# Patient Record
Sex: FEMALE | Race: WHITE | NOT HISPANIC OR LATINO | Employment: FULL TIME | URBAN - METROPOLITAN AREA
[De-identification: names, ages, dates, MRNs, and addresses within clinical notes are randomized per-mention and may not be internally consistent; named-entity substitution may affect disease eponyms.]

---

## 2017-01-24 ENCOUNTER — COMMUNICATION - HEALTHEAST (OUTPATIENT)
Dept: FAMILY MEDICINE | Facility: CLINIC | Age: 37
End: 2017-01-24

## 2017-01-24 DIAGNOSIS — F17.200 NICOTINE DEPENDENCE: ICD-10-CM

## 2017-06-03 ENCOUNTER — COMMUNICATION - HEALTHEAST (OUTPATIENT)
Dept: FAMILY MEDICINE | Facility: CLINIC | Age: 37
End: 2017-06-03

## 2017-06-03 DIAGNOSIS — F17.200 NICOTINE DEPENDENCE: ICD-10-CM

## 2017-06-06 ENCOUNTER — OFFICE VISIT - RIVER FALLS (OUTPATIENT)
Dept: FAMILY MEDICINE | Facility: CLINIC | Age: 37
End: 2017-06-06
Payer: COMMERCIAL

## 2017-06-06 ASSESSMENT — MIFFLIN-ST. JEOR: SCORE: 1640.2

## 2017-06-16 ENCOUNTER — OFFICE VISIT - RIVER FALLS (OUTPATIENT)
Dept: FAMILY MEDICINE | Facility: CLINIC | Age: 37
End: 2017-06-16
Payer: COMMERCIAL

## 2017-06-16 ASSESSMENT — MIFFLIN-ST. JEOR: SCORE: 1641.11

## 2017-07-17 ENCOUNTER — OFFICE VISIT - RIVER FALLS (OUTPATIENT)
Dept: FAMILY MEDICINE | Facility: CLINIC | Age: 37
End: 2017-07-17
Payer: COMMERCIAL

## 2017-07-17 ASSESSMENT — MIFFLIN-ST. JEOR: SCORE: 1657.44

## 2017-07-31 ENCOUNTER — COMMUNICATION - HEALTHEAST (OUTPATIENT)
Dept: FAMILY MEDICINE | Facility: CLINIC | Age: 37
End: 2017-07-31

## 2017-07-31 DIAGNOSIS — F17.200 NICOTINE DEPENDENCE: ICD-10-CM

## 2017-07-31 RX ORDER — VARENICLINE TARTRATE 1 MG/1
TABLET, FILM COATED ORAL
Qty: 56 TABLET | Refills: 0 | Status: SHIPPED | OUTPATIENT
Start: 2017-07-31 | End: 2022-03-24

## 2017-07-31 RX ORDER — VARENICLINE TARTRATE 1 MG/1
1 TABLET, FILM COATED ORAL 2 TIMES DAILY WITH MEALS
Qty: 56 TABLET | Refills: 0 | Status: SHIPPED | OUTPATIENT
Start: 2017-07-31 | End: 2022-03-24

## 2017-08-14 ENCOUNTER — OFFICE VISIT - RIVER FALLS (OUTPATIENT)
Dept: FAMILY MEDICINE | Facility: CLINIC | Age: 37
End: 2017-08-14
Payer: COMMERCIAL

## 2017-08-14 ASSESSMENT — MIFFLIN-ST. JEOR: SCORE: 1626.37

## 2017-08-17 ENCOUNTER — OFFICE VISIT - RIVER FALLS (OUTPATIENT)
Dept: FAMILY MEDICINE | Facility: CLINIC | Age: 37
End: 2017-08-17
Payer: COMMERCIAL

## 2017-08-17 ASSESSMENT — MIFFLIN-ST. JEOR: SCORE: 1610.27

## 2017-09-14 ENCOUNTER — OFFICE VISIT - RIVER FALLS (OUTPATIENT)
Dept: FAMILY MEDICINE | Facility: CLINIC | Age: 37
End: 2017-09-14
Payer: COMMERCIAL

## 2017-09-14 ASSESSMENT — MIFFLIN-ST. JEOR: SCORE: 1603.01

## 2017-10-11 ENCOUNTER — OFFICE VISIT - RIVER FALLS (OUTPATIENT)
Dept: FAMILY MEDICINE | Facility: CLINIC | Age: 37
End: 2017-10-11
Payer: COMMERCIAL

## 2017-10-11 ASSESSMENT — MIFFLIN-ST. JEOR: SCORE: 1608.45

## 2017-10-17 ENCOUNTER — OFFICE VISIT - RIVER FALLS (OUTPATIENT)
Dept: FAMILY MEDICINE | Facility: CLINIC | Age: 37
End: 2017-10-17
Payer: COMMERCIAL

## 2017-10-17 ASSESSMENT — MIFFLIN-ST. JEOR: SCORE: 1595.75

## 2017-11-16 ENCOUNTER — OFFICE VISIT - RIVER FALLS (OUTPATIENT)
Dept: FAMILY MEDICINE | Facility: CLINIC | Age: 37
End: 2017-11-16
Payer: COMMERCIAL

## 2017-11-16 ASSESSMENT — MIFFLIN-ST. JEOR: SCORE: 1575.79

## 2017-12-14 ENCOUNTER — OFFICE VISIT - RIVER FALLS (OUTPATIENT)
Dept: FAMILY MEDICINE | Facility: CLINIC | Age: 37
End: 2017-12-14
Payer: COMMERCIAL

## 2017-12-14 ASSESSMENT — MIFFLIN-ST. JEOR: SCORE: 1586.68

## 2017-12-18 ENCOUNTER — OFFICE VISIT - RIVER FALLS (OUTPATIENT)
Dept: FAMILY MEDICINE | Facility: CLINIC | Age: 37
End: 2017-12-18
Payer: COMMERCIAL

## 2017-12-18 ASSESSMENT — MIFFLIN-ST. JEOR: SCORE: 1595.75

## 2018-01-18 ENCOUNTER — OFFICE VISIT - RIVER FALLS (OUTPATIENT)
Dept: FAMILY MEDICINE | Facility: CLINIC | Age: 38
End: 2018-01-18
Payer: COMMERCIAL

## 2018-01-18 ASSESSMENT — MIFFLIN-ST. JEOR: SCORE: 1569.44

## 2018-02-05 ENCOUNTER — OFFICE VISIT - RIVER FALLS (OUTPATIENT)
Dept: FAMILY MEDICINE | Facility: CLINIC | Age: 38
End: 2018-02-05
Payer: COMMERCIAL

## 2018-02-05 ASSESSMENT — MIFFLIN-ST. JEOR: SCORE: 1558.33

## 2018-02-22 ENCOUNTER — OFFICE VISIT - RIVER FALLS (OUTPATIENT)
Dept: FAMILY MEDICINE | Facility: CLINIC | Age: 38
End: 2018-02-22
Payer: COMMERCIAL

## 2018-02-22 ASSESSMENT — MIFFLIN-ST. JEOR: SCORE: 1555.84

## 2018-03-30 ENCOUNTER — COMMUNICATION - HEALTHEAST (OUTPATIENT)
Dept: HEALTH INFORMATION MANAGEMENT | Facility: CLINIC | Age: 38
End: 2018-03-30

## 2018-03-30 ENCOUNTER — COMMUNICATION - HEALTHEAST (OUTPATIENT)
Dept: TELEHEALTH | Facility: CLINIC | Age: 38
End: 2018-03-30

## 2018-05-03 ENCOUNTER — OFFICE VISIT - RIVER FALLS (OUTPATIENT)
Dept: FAMILY MEDICINE | Facility: CLINIC | Age: 38
End: 2018-05-03
Payer: COMMERCIAL

## 2018-05-03 ASSESSMENT — MIFFLIN-ST. JEOR: SCORE: 1540.41

## 2018-06-04 ENCOUNTER — OFFICE VISIT - RIVER FALLS (OUTPATIENT)
Dept: FAMILY MEDICINE | Facility: CLINIC | Age: 38
End: 2018-06-04
Payer: COMMERCIAL

## 2018-06-04 ASSESSMENT — MIFFLIN-ST. JEOR: SCORE: 1542.45

## 2018-09-20 ENCOUNTER — OFFICE VISIT - RIVER FALLS (OUTPATIENT)
Dept: FAMILY MEDICINE | Facility: CLINIC | Age: 38
End: 2018-09-20
Payer: COMMERCIAL

## 2018-09-20 ASSESSMENT — MIFFLIN-ST. JEOR: SCORE: 1516.83

## 2019-03-21 ENCOUNTER — COMMUNICATION - HEALTHEAST (OUTPATIENT)
Dept: TELEHEALTH | Facility: CLINIC | Age: 39
End: 2019-03-21

## 2019-05-29 ENCOUNTER — COMMUNICATION - RIVER FALLS (OUTPATIENT)
Dept: FAMILY MEDICINE | Facility: CLINIC | Age: 39
End: 2019-05-29
Payer: COMMERCIAL

## 2019-06-20 ENCOUNTER — OFFICE VISIT - RIVER FALLS (OUTPATIENT)
Dept: FAMILY MEDICINE | Facility: CLINIC | Age: 39
End: 2019-06-20
Payer: COMMERCIAL

## 2019-06-20 ASSESSMENT — MIFFLIN-ST. JEOR: SCORE: 1559.46

## 2019-06-21 ENCOUNTER — COMMUNICATION - RIVER FALLS (OUTPATIENT)
Dept: FAMILY MEDICINE | Facility: CLINIC | Age: 39
End: 2019-06-21
Payer: COMMERCIAL

## 2019-06-21 LAB
CHOLEST SERPL-MCNC: 170 MG/DL
CHOLEST/HDLC SERPL: 2.7 {RATIO}
GLUCOSE BLD-MCNC: 85 MG/DL (ref 65–99)
HDLC SERPL-MCNC: 62 MG/DL
LDLC SERPL CALC-MCNC: 90 MG/DL
NONHDLC SERPL-MCNC: 108 MG/DL
TRIGL SERPL-MCNC: 87 MG/DL

## 2019-06-24 ENCOUNTER — COMMUNICATION - RIVER FALLS (OUTPATIENT)
Dept: FAMILY MEDICINE | Facility: CLINIC | Age: 39
End: 2019-06-24
Payer: COMMERCIAL

## 2019-06-25 ENCOUNTER — COMMUNICATION - RIVER FALLS (OUTPATIENT)
Dept: FAMILY MEDICINE | Facility: CLINIC | Age: 39
End: 2019-06-25
Payer: COMMERCIAL

## 2019-06-25 ENCOUNTER — TRANSFERRED RECORDS (OUTPATIENT)
Dept: HEALTH INFORMATION MANAGEMENT | Facility: CLINIC | Age: 39
End: 2019-06-25

## 2019-06-25 LAB — HPV ABSTRACT: NORMAL

## 2019-07-10 ENCOUNTER — COMMUNICATION - RIVER FALLS (OUTPATIENT)
Dept: FAMILY MEDICINE | Facility: CLINIC | Age: 39
End: 2019-07-10
Payer: COMMERCIAL

## 2019-07-24 ENCOUNTER — COMMUNICATION - RIVER FALLS (OUTPATIENT)
Dept: FAMILY MEDICINE | Facility: CLINIC | Age: 39
End: 2019-07-24
Payer: COMMERCIAL

## 2019-08-08 ENCOUNTER — COMMUNICATION - RIVER FALLS (OUTPATIENT)
Dept: FAMILY MEDICINE | Facility: CLINIC | Age: 39
End: 2019-08-08
Payer: COMMERCIAL

## 2019-08-09 ENCOUNTER — COMMUNICATION - RIVER FALLS (OUTPATIENT)
Dept: FAMILY MEDICINE | Facility: CLINIC | Age: 39
End: 2019-08-09
Payer: COMMERCIAL

## 2019-09-10 ENCOUNTER — COMMUNICATION - RIVER FALLS (OUTPATIENT)
Dept: FAMILY MEDICINE | Facility: CLINIC | Age: 39
End: 2019-09-10
Payer: COMMERCIAL

## 2019-09-25 ENCOUNTER — COMMUNICATION - RIVER FALLS (OUTPATIENT)
Dept: FAMILY MEDICINE | Facility: CLINIC | Age: 39
End: 2019-09-25
Payer: COMMERCIAL

## 2019-10-10 ENCOUNTER — OFFICE VISIT - RIVER FALLS (OUTPATIENT)
Dept: FAMILY MEDICINE | Facility: CLINIC | Age: 39
End: 2019-10-10
Payer: COMMERCIAL

## 2019-10-10 ASSESSMENT — MIFFLIN-ST. JEOR: SCORE: 1541.32

## 2019-11-08 ENCOUNTER — COMMUNICATION - RIVER FALLS (OUTPATIENT)
Dept: FAMILY MEDICINE | Facility: CLINIC | Age: 39
End: 2019-11-08
Payer: COMMERCIAL

## 2019-11-26 ENCOUNTER — COMMUNICATION - RIVER FALLS (OUTPATIENT)
Dept: FAMILY MEDICINE | Facility: CLINIC | Age: 39
End: 2019-11-26
Payer: COMMERCIAL

## 2019-12-09 ENCOUNTER — COMMUNICATION - RIVER FALLS (OUTPATIENT)
Dept: FAMILY MEDICINE | Facility: CLINIC | Age: 39
End: 2019-12-09
Payer: COMMERCIAL

## 2020-01-09 ENCOUNTER — OFFICE VISIT - RIVER FALLS (OUTPATIENT)
Dept: FAMILY MEDICINE | Facility: CLINIC | Age: 40
End: 2020-01-09
Payer: COMMERCIAL

## 2020-01-09 ASSESSMENT — MIFFLIN-ST. JEOR: SCORE: 1546.76

## 2020-01-15 ENCOUNTER — COMMUNICATION - RIVER FALLS (OUTPATIENT)
Dept: FAMILY MEDICINE | Facility: CLINIC | Age: 40
End: 2020-01-15
Payer: COMMERCIAL

## 2020-02-10 ENCOUNTER — COMMUNICATION - RIVER FALLS (OUTPATIENT)
Dept: FAMILY MEDICINE | Facility: CLINIC | Age: 40
End: 2020-02-10
Payer: COMMERCIAL

## 2020-03-10 ENCOUNTER — COMMUNICATION - RIVER FALLS (OUTPATIENT)
Dept: FAMILY MEDICINE | Facility: CLINIC | Age: 40
End: 2020-03-10
Payer: COMMERCIAL

## 2020-03-24 ENCOUNTER — COMMUNICATION - HEALTHEAST (OUTPATIENT)
Dept: HEALTH INFORMATION MANAGEMENT | Facility: CLINIC | Age: 40
End: 2020-03-24

## 2020-04-07 ENCOUNTER — COMMUNICATION - RIVER FALLS (OUTPATIENT)
Dept: FAMILY MEDICINE | Facility: CLINIC | Age: 40
End: 2020-04-07
Payer: COMMERCIAL

## 2020-05-06 ENCOUNTER — COMMUNICATION - RIVER FALLS (OUTPATIENT)
Dept: FAMILY MEDICINE | Facility: CLINIC | Age: 40
End: 2020-05-06
Payer: COMMERCIAL

## 2020-05-12 ENCOUNTER — OFFICE VISIT - RIVER FALLS (OUTPATIENT)
Dept: FAMILY MEDICINE | Facility: CLINIC | Age: 40
End: 2020-05-12
Payer: COMMERCIAL

## 2020-06-04 ENCOUNTER — COMMUNICATION - RIVER FALLS (OUTPATIENT)
Dept: FAMILY MEDICINE | Facility: CLINIC | Age: 40
End: 2020-06-04
Payer: COMMERCIAL

## 2020-07-02 ENCOUNTER — COMMUNICATION - RIVER FALLS (OUTPATIENT)
Dept: FAMILY MEDICINE | Facility: CLINIC | Age: 40
End: 2020-07-02
Payer: COMMERCIAL

## 2020-08-03 ENCOUNTER — COMMUNICATION - RIVER FALLS (OUTPATIENT)
Dept: FAMILY MEDICINE | Facility: CLINIC | Age: 40
End: 2020-08-03
Payer: COMMERCIAL

## 2020-08-18 ENCOUNTER — COMMUNICATION - RIVER FALLS (OUTPATIENT)
Dept: FAMILY MEDICINE | Facility: CLINIC | Age: 40
End: 2020-08-18
Payer: COMMERCIAL

## 2020-08-19 ENCOUNTER — AMBULATORY - HEALTHEAST (OUTPATIENT)
Dept: FAMILY MEDICINE | Facility: CLINIC | Age: 40
End: 2020-08-19

## 2020-08-19 ENCOUNTER — VIRTUAL VISIT (OUTPATIENT)
Dept: FAMILY MEDICINE | Facility: OTHER | Age: 40
End: 2020-08-19

## 2020-08-19 ENCOUNTER — AMBULATORY - HEALTHEAST (OUTPATIENT)
Dept: INTERNAL MEDICINE | Facility: CLINIC | Age: 40
End: 2020-08-19

## 2020-08-19 DIAGNOSIS — Z20.822 SUSPECTED 2019 NOVEL CORONAVIRUS INFECTION: ICD-10-CM

## 2020-08-19 NOTE — PROGRESS NOTES
"Date: 2020 08:56:19  Clinician: Codey Thomason  Clinician NPI: [id number]  Patient: Anabela Bartholomew  Patient : 1980  Patient Address: [address] [place] [address]  Patient Phone: [phone]  Visit Protocol: URI  Patient Summary:  Anabela is a 39 year old ( : 1980 ) female who initiated a Visit for COVID-19 (Coronavirus) evaluation and screening. When asked the question \"Please sign me up to receive news, health information and promotions. \", Anabela responded \"No\".    Anabela states her symptoms started suddenly 5-6 days ago. After her symptoms started, they improved and then got worse again.   Her symptoms consist of a headache, chills, malaise, a sore throat, myalgia, and facial pain or pressure. Anabela also feels feverish.   Symptom details     Sore throat: Anabela reports having mild throat pain (1-3 on a 10 point pain scale), does not have exudate on her tonsils, and can swallow liquids. She is not sure if the lymph nodes in her neck are enlarged. A rash has not appeared on the skin since the sore throat started.     Temperature: Her current temperature is 99.2 degrees Fahrenheit.     Facial pain or pressure: The facial pain or pressure does not feel worse when bending or leaning forward.     Headache: She states the headache is moderate (4-6 on a 10 point pain scale).      Anabela denies having ear pain, wheezing, teeth pain, ageusia, diarrhea, cough, nasal congestion, vomiting, rhinitis, nausea, and anosmia. She also denies having a sinus infection within the past year, having recent facial or sinus surgery in the past 60 days, and taking antibiotic medication in the past month. She is not experiencing dyspnea.   Precipitating events  Within the past week, Anabela has not been exposed to someone with strep throat. She has not recently been exposed to someone with influenza. Anabela has not been in close contact with any high risk individuals.   Pertinent COVID-19 (Coronavirus) " information  In the past 14 days, Anabela has not worked in a congregate living setting.   She does not work or volunteer as healthcare worker or a  and does not work or volunteer in a healthcare facility.   Anabela also has not lived in a congregate living setting in the past 14 days. She does not live with a healthcare worker.   Anabela has not had a close contact with a laboratory-confirmed COVID-19 patient within 14 days of symptom onset.   Since December 2019, Anabela and has not had upper respiratory infection or influenza-like illness. Has not been diagnosed with lab-confirmed COVID-19 test   Pertinent medical history  Anabela does not get yeast infections when she takes antibiotics.   Anabela needs a return to work/school note.   Weight: 200 lbs   Anabela smokes or uses smokeless tobacco.   She denies pregnancy and denies breastfeeding. She is currently menstruating.   Weight: 200 lbs    MEDICATIONS: Adderall oral, Complete Multivitamin-Multimineral oral, vitamin A-vitamin D3-vitamin E-vitamin K oral, Chantix Continuing Month Box oral, fluticasone propionate nasal, azelastine-fluticasone nasal, ALLERGIES: Sulfa (Sulfonamide Antibiotics), Penicillins  Clinician Response:  Dear Anabela,   Your symptoms show that you may have coronavirus (COVID-19). This illness can cause fever, cough and trouble breathing. Many people get a mild case and get better on their own. Some people can get very sick.  Based on the symptoms you have shared, I would like you to be re-checked in 2 to 3 days. Please call your family clinic to set up a video or phone visit.  Will I be tested for COVID-19?  We would like to test you for this virus.   Please call [phone] to schedule your visit. Explain that you were referred by [facility] to have a COVID-19 test. Be ready to share your [facility] visit ID number.   The following will serve as your written order for this COVID Test, ordered by me, for the indication of suspected COVID  "[[id number]]: The test will be ordered in [facility], our electronic health record, after you are scheduled. It will show as ordered and authorized by Jani Ovalle MD.  Order: COVID-19 (Coronavirus) PCR for SYMPTOMATIC testing from [facility].  1.When it's time for your COVID test:   Stay at least 6 feet away from others. (If someone will drive you to your test, stay in the backseat, as far away from the  as you can.)   Cover your mouth and nose with a mask, tissue or washcloth.  Go straight to the testing site. Don't make any stops on the way there or back.      2.Starting now: Stay home and away from others (self-isolate) until:   You've had no fever---and no medicine that reduces fever---for one full day (24 hours). And...   Your other symptoms have gotten better. For example, your cough or breathing has improved. And...   At least 10 days have passed since your symptoms started.       During this time, don't leave the house except for testing or medical care.   Stay in your own room, even for meals. Use your own bathroom if you can.   Stay away from others in your home. No hugging, kissing or shaking hands. No visitors.  Don't go to work, school or anywhere else.    Clean \"high touch\" surfaces often (doorknobs, counters, handles, etc.). Use a household cleaning spray or wipes. You'll find a full list of  on the EPA website: www.epa.gov/pesticide-registration/list-n-disinfectants-use-against-sars-cov-2.   Cover your mouth and nose with a mask, tissue or washcloth to avoid spreading germs.  Wash your hands and face often. Use soap and water.  Caregivers in these groups are at risk for severe illness due to COVID-19:  o People 65 years and older  o People who live in a nursing home or long-term care facility  o People with chronic disease (lung, heart, cancer, diabetes, kidney, liver, immunologic)   o People who have a weakened immune system, including those who:   Are in cancer treatment  Take medicine that " weakens the immune system, such as corticosteroids  Had a bone marrow or organ transplant  Have an immune deficiency  Have poorly controlled HIV or AIDS  Are obese (body mass index of 40 or higher)  Smoke regularly   o Caregivers should wear gloves while washing dishes, handling laundry and cleaning bedrooms and bathrooms.  o Use caution when washing and drying laundry: Don't shake dirty laundry, and use the warmest water setting that you can.  o For more tips, go to www.cdc.gov/coronavirus/2019-ncov/downloads/10Things.pdf.      How can I take care of myself?   Get lots of rest. Drink extra fluids (unless a doctor has told you not to)   Take Tylenol (acetaminophen) for fever or pain. If you have liver or kidney problems, ask your family doctor if it's okay to take Tylenol.   Adults can take either:    650 mg (two 325 mg pills) every 4 to 6 hours, or...   1,000 mg (two 500 mg pills) every 8 hours as needed.    Note: Don't take more than 3,000 mg in one day. Acetaminophen is found in many medicines (both prescribed and over-the-counter medicines). Read all labels to be sure you don't take too much.   For children, check the Tylenol bottle for the right dose. The dose is based on the child's age or weight.    If you have other health problems (like cancer, heart failure, an organ transplant or severe kidney disease): Call your specialty clinic if you don't feel better in the next 2 days.       Know when to call 911. Emergency warning signs include:    Trouble breathing or shortness of breath Pain or pressure in the chest that doesn't go away Feeling confused like you haven't felt before, or not being able to wake up Bluish-colored lips or face  Where can I get more information?    [facility] -- About COVID-19: [email or web address]   CDC -- What to Do If You're Sick: www.cdc.gov/coronavirus/2019-ncov/about/steps-when-sick.html   CDC -- Ending Home Isolation:  www.cdc.gov/coronavirus/2019-ncov/hcp/disposition-in-home-patients.html   [facility] -- Caring for Someone: www.cdc.gov/coronavirus/2019-ncov/if-you-are-sick/care-for-someone.html   [facility] -- Interim Guidance for Hospital Discharge to Home: www.[facility].[place].us/diseases/coronavirus/hcp/hospdischarge.pdf   [facility] clinical trials (COVID-19 research studies): clinicalaffairs.[facility]-clinical-trials    Below are the COVID-19 hotlines at the [place] Department of Health ([facility]). Interpreters are available.    For health questions: Call 173-509-8363 or 1-583.217.2257 (7 a.m. to 7 p.m.) For questions about schools and childcare: Call 220-292-4980 or 1-298.698.3244 (7 a.m. to 7 p.m.)       Diagnosis: Acute pharyngitis, unspecified  Diagnosis ICD: J02.9

## 2020-08-21 ENCOUNTER — COMMUNICATION - HEALTHEAST (OUTPATIENT)
Dept: SCHEDULING | Facility: CLINIC | Age: 40
End: 2020-08-21

## 2020-09-01 ENCOUNTER — COMMUNICATION - RIVER FALLS (OUTPATIENT)
Dept: FAMILY MEDICINE | Facility: CLINIC | Age: 40
End: 2020-09-01
Payer: COMMERCIAL

## 2020-09-15 ENCOUNTER — OFFICE VISIT - RIVER FALLS (OUTPATIENT)
Dept: FAMILY MEDICINE | Facility: CLINIC | Age: 40
End: 2020-09-15
Payer: COMMERCIAL

## 2020-09-15 ASSESSMENT — MIFFLIN-ST. JEOR: SCORE: 1539.51

## 2020-09-29 ENCOUNTER — COMMUNICATION - RIVER FALLS (OUTPATIENT)
Dept: FAMILY MEDICINE | Facility: CLINIC | Age: 40
End: 2020-09-29
Payer: COMMERCIAL

## 2020-10-28 ENCOUNTER — COMMUNICATION - RIVER FALLS (OUTPATIENT)
Dept: FAMILY MEDICINE | Facility: CLINIC | Age: 40
End: 2020-10-28
Payer: COMMERCIAL

## 2020-11-24 ENCOUNTER — COMMUNICATION - RIVER FALLS (OUTPATIENT)
Dept: FAMILY MEDICINE | Facility: CLINIC | Age: 40
End: 2020-11-24
Payer: COMMERCIAL

## 2020-12-14 ENCOUNTER — HEALTH MAINTENANCE LETTER (OUTPATIENT)
Age: 40
End: 2020-12-14

## 2020-12-22 ENCOUNTER — COMMUNICATION - RIVER FALLS (OUTPATIENT)
Dept: FAMILY MEDICINE | Facility: CLINIC | Age: 40
End: 2020-12-22
Payer: COMMERCIAL

## 2021-01-20 ENCOUNTER — COMMUNICATION - RIVER FALLS (OUTPATIENT)
Dept: FAMILY MEDICINE | Facility: CLINIC | Age: 41
End: 2021-01-20
Payer: COMMERCIAL

## 2021-02-10 ENCOUNTER — COMMUNICATION - RIVER FALLS (OUTPATIENT)
Dept: FAMILY MEDICINE | Facility: CLINIC | Age: 41
End: 2021-02-10
Payer: COMMERCIAL

## 2021-02-17 ENCOUNTER — OFFICE VISIT - RIVER FALLS (OUTPATIENT)
Dept: FAMILY MEDICINE | Facility: CLINIC | Age: 41
End: 2021-02-17
Payer: COMMERCIAL

## 2021-02-17 ASSESSMENT — MIFFLIN-ST. JEOR: SCORE: 1507.75

## 2021-03-16 ENCOUNTER — COMMUNICATION - RIVER FALLS (OUTPATIENT)
Dept: FAMILY MEDICINE | Facility: CLINIC | Age: 41
End: 2021-03-16
Payer: COMMERCIAL

## 2021-04-15 ENCOUNTER — COMMUNICATION - RIVER FALLS (OUTPATIENT)
Dept: FAMILY MEDICINE | Facility: CLINIC | Age: 41
End: 2021-04-15
Payer: COMMERCIAL

## 2021-05-15 ENCOUNTER — COMMUNICATION - RIVER FALLS (OUTPATIENT)
Dept: FAMILY MEDICINE | Facility: CLINIC | Age: 41
End: 2021-05-15
Payer: COMMERCIAL

## 2021-05-17 ENCOUNTER — COMMUNICATION - RIVER FALLS (OUTPATIENT)
Dept: FAMILY MEDICINE | Facility: CLINIC | Age: 41
End: 2021-05-17
Payer: COMMERCIAL

## 2021-06-15 ENCOUNTER — COMMUNICATION - RIVER FALLS (OUTPATIENT)
Dept: FAMILY MEDICINE | Facility: CLINIC | Age: 41
End: 2021-06-15
Payer: COMMERCIAL

## 2021-07-07 ENCOUNTER — OFFICE VISIT - RIVER FALLS (OUTPATIENT)
Dept: FAMILY MEDICINE | Facility: CLINIC | Age: 41
End: 2021-07-07
Payer: COMMERCIAL

## 2021-07-07 ASSESSMENT — MIFFLIN-ST. JEOR: SCORE: 1482.35

## 2021-08-05 ENCOUNTER — COMMUNICATION - RIVER FALLS (OUTPATIENT)
Dept: FAMILY MEDICINE | Facility: CLINIC | Age: 41
End: 2021-08-05
Payer: COMMERCIAL

## 2021-08-05 ENCOUNTER — AMBULATORY - RIVER FALLS (OUTPATIENT)
Dept: FAMILY MEDICINE | Facility: CLINIC | Age: 41
End: 2021-08-05
Payer: COMMERCIAL

## 2021-08-13 ENCOUNTER — COMMUNICATION - RIVER FALLS (OUTPATIENT)
Dept: FAMILY MEDICINE | Facility: CLINIC | Age: 41
End: 2021-08-13
Payer: COMMERCIAL

## 2021-10-02 ENCOUNTER — HEALTH MAINTENANCE LETTER (OUTPATIENT)
Age: 41
End: 2021-10-02

## 2021-10-06 ENCOUNTER — COMMUNICATION - RIVER FALLS (OUTPATIENT)
Dept: FAMILY MEDICINE | Facility: CLINIC | Age: 41
End: 2021-10-06
Payer: COMMERCIAL

## 2021-10-21 ENCOUNTER — OFFICE VISIT - RIVER FALLS (OUTPATIENT)
Dept: FAMILY MEDICINE | Facility: CLINIC | Age: 41
End: 2021-10-21
Payer: COMMERCIAL

## 2021-10-21 ASSESSMENT — MIFFLIN-ST. JEOR: SCORE: 1464.21

## 2021-11-03 ENCOUNTER — HOSPITAL ENCOUNTER (OUTPATIENT)
Dept: MAMMOGRAPHY | Facility: CLINIC | Age: 41
Discharge: HOME | End: 2021-11-03
Attending: FAMILY MEDICINE | Admitting: FAMILY MEDICINE
Payer: COMMERCIAL

## 2021-11-03 DIAGNOSIS — Z12.31 VISIT FOR SCREENING MAMMOGRAM: ICD-10-CM

## 2021-11-03 PROCEDURE — 77067 SCR MAMMO BI INCL CAD: CPT

## 2021-11-24 ENCOUNTER — ANCILLARY PROCEDURE (OUTPATIENT)
Dept: MAMMOGRAPHY | Facility: CLINIC | Age: 41
End: 2021-11-24
Attending: FAMILY MEDICINE
Payer: COMMERCIAL

## 2021-11-24 DIAGNOSIS — N64.89 BREAST ASYMMETRY: ICD-10-CM

## 2021-11-24 PROCEDURE — 77061 BREAST TOMOSYNTHESIS UNI: CPT | Mod: LT

## 2021-12-02 ENCOUNTER — COMMUNICATION - RIVER FALLS (OUTPATIENT)
Dept: FAMILY MEDICINE | Facility: CLINIC | Age: 41
End: 2021-12-02
Payer: COMMERCIAL

## 2021-12-31 ENCOUNTER — COMMUNICATION - RIVER FALLS (OUTPATIENT)
Dept: FAMILY MEDICINE | Facility: CLINIC | Age: 41
End: 2021-12-31
Payer: COMMERCIAL

## 2022-01-22 ENCOUNTER — HEALTH MAINTENANCE LETTER (OUTPATIENT)
Age: 42
End: 2022-01-22

## 2022-02-01 ENCOUNTER — COMMUNICATION - RIVER FALLS (OUTPATIENT)
Dept: FAMILY MEDICINE | Facility: CLINIC | Age: 42
End: 2022-02-01
Payer: COMMERCIAL

## 2022-02-11 VITALS
DIASTOLIC BLOOD PRESSURE: 70 MMHG | HEIGHT: 67 IN | WEIGHT: 186.25 LBS | BODY MASS INDEX: 29.23 KG/M2 | SYSTOLIC BLOOD PRESSURE: 112 MMHG

## 2022-02-11 VITALS
HEIGHT: 67 IN | WEIGHT: 169 LBS | TEMPERATURE: 96.1 F | DIASTOLIC BLOOD PRESSURE: 73 MMHG | HEART RATE: 96 BPM | BODY MASS INDEX: 26.53 KG/M2 | SYSTOLIC BLOOD PRESSURE: 123 MMHG

## 2022-02-12 VITALS
TEMPERATURE: 98.2 F | OXYGEN SATURATION: 99 % | WEIGHT: 173 LBS | DIASTOLIC BLOOD PRESSURE: 76 MMHG | SYSTOLIC BLOOD PRESSURE: 121 MMHG | BODY MASS INDEX: 27.15 KG/M2 | HEART RATE: 92 BPM | HEIGHT: 67 IN

## 2022-02-12 VITALS — HEIGHT: 67 IN | WEIGHT: 180.6 LBS | BODY MASS INDEX: 28.35 KG/M2

## 2022-02-12 VITALS
HEIGHT: 67 IN | HEART RATE: 84 BPM | WEIGHT: 201.2 LBS | WEIGHT: 208 LBS | TEMPERATURE: 98.4 F | BODY MASS INDEX: 32.65 KG/M2 | HEIGHT: 67 IN | BODY MASS INDEX: 31.58 KG/M2 | DIASTOLIC BLOOD PRESSURE: 74 MMHG | WEIGHT: 207.8 LBS | DIASTOLIC BLOOD PRESSURE: 75 MMHG | HEIGHT: 67 IN | HEIGHT: 67 IN | BODY MASS INDEX: 32.13 KG/M2 | BODY MASS INDEX: 33.21 KG/M2 | TEMPERATURE: 99.5 F | TEMPERATURE: 98 F | DIASTOLIC BLOOD PRESSURE: 70 MMHG | SYSTOLIC BLOOD PRESSURE: 111 MMHG | HEART RATE: 84 BPM | WEIGHT: 211.6 LBS | SYSTOLIC BLOOD PRESSURE: 122 MMHG | SYSTOLIC BLOOD PRESSURE: 124 MMHG | HEART RATE: 84 BPM | BODY MASS INDEX: 32.62 KG/M2 | WEIGHT: 204.75 LBS | HEIGHT: 67 IN

## 2022-02-12 VITALS
BODY MASS INDEX: 29.82 KG/M2 | WEIGHT: 190 LBS | HEIGHT: 67 IN | TEMPERATURE: 97.2 F | SYSTOLIC BLOOD PRESSURE: 118 MMHG | HEART RATE: 76 BPM | DIASTOLIC BLOOD PRESSURE: 74 MMHG

## 2022-02-12 VITALS
HEIGHT: 67 IN | DIASTOLIC BLOOD PRESSURE: 74 MMHG | SYSTOLIC BLOOD PRESSURE: 120 MMHG | HEART RATE: 96 BPM | HEART RATE: 72 BPM | HEIGHT: 67 IN | HEIGHT: 67 IN | WEIGHT: 193.6 LBS | TEMPERATURE: 98.6 F | TEMPERATURE: 99.9 F | BODY MASS INDEX: 31.33 KG/M2 | SYSTOLIC BLOOD PRESSURE: 114 MMHG | WEIGHT: 200.8 LBS | HEIGHT: 67 IN | DIASTOLIC BLOOD PRESSURE: 80 MMHG | BODY MASS INDEX: 31.08 KG/M2 | WEIGHT: 198 LBS | HEART RATE: 88 BPM | TEMPERATURE: 97.8 F | DIASTOLIC BLOOD PRESSURE: 60 MMHG | WEIGHT: 199.6 LBS | BODY MASS INDEX: 30.39 KG/M2 | BODY MASS INDEX: 31.52 KG/M2 | SYSTOLIC BLOOD PRESSURE: 94 MMHG

## 2022-02-12 VITALS
HEART RATE: 94 BPM | DIASTOLIC BLOOD PRESSURE: 80 MMHG | OXYGEN SATURATION: 98 % | BODY MASS INDEX: 28.03 KG/M2 | HEIGHT: 67 IN | WEIGHT: 178.6 LBS | SYSTOLIC BLOOD PRESSURE: 142 MMHG | TEMPERATURE: 97.6 F

## 2022-02-12 VITALS
HEART RATE: 96 BPM | WEIGHT: 186 LBS | HEIGHT: 67 IN | BODY MASS INDEX: 29.19 KG/M2 | DIASTOLIC BLOOD PRESSURE: 72 MMHG | TEMPERATURE: 97.9 F | SYSTOLIC BLOOD PRESSURE: 128 MMHG

## 2022-02-12 VITALS
HEART RATE: 100 BPM | WEIGHT: 185.8 LBS | BODY MASS INDEX: 29.16 KG/M2 | HEIGHT: 67 IN | TEMPERATURE: 98.5 F | SYSTOLIC BLOOD PRESSURE: 134 MMHG | DIASTOLIC BLOOD PRESSURE: 88 MMHG

## 2022-02-12 VITALS
SYSTOLIC BLOOD PRESSURE: 110 MMHG | BODY MASS INDEX: 29.7 KG/M2 | BODY MASS INDEX: 31.08 KG/M2 | DIASTOLIC BLOOD PRESSURE: 82 MMHG | DIASTOLIC BLOOD PRESSURE: 76 MMHG | HEART RATE: 80 BPM | HEART RATE: 96 BPM | HEART RATE: 92 BPM | HEIGHT: 67 IN | WEIGHT: 196 LBS | DIASTOLIC BLOOD PRESSURE: 72 MMHG | HEIGHT: 67 IN | SYSTOLIC BLOOD PRESSURE: 100 MMHG | WEIGHT: 189.75 LBS | BODY MASS INDEX: 30.17 KG/M2 | HEIGHT: 67 IN | HEIGHT: 67 IN | TEMPERATURE: 98 F | WEIGHT: 192.2 LBS | BODY MASS INDEX: 30.76 KG/M2 | TEMPERATURE: 98.8 F | HEIGHT: 67 IN | SYSTOLIC BLOOD PRESSURE: 126 MMHG | WEIGHT: 189.2 LBS | TEMPERATURE: 97.9 F | BODY MASS INDEX: 29.78 KG/M2 | WEIGHT: 198 LBS

## 2022-02-12 VITALS — BODY MASS INDEX: 29.32 KG/M2 | HEIGHT: 67 IN

## 2022-02-12 VITALS
DIASTOLIC BLOOD PRESSURE: 70 MMHG | TEMPERATURE: 98.4 F | BODY MASS INDEX: 29.38 KG/M2 | HEART RATE: 96 BPM | SYSTOLIC BLOOD PRESSURE: 112 MMHG | WEIGHT: 187.2 LBS | HEIGHT: 67 IN

## 2022-02-12 VITALS
SYSTOLIC BLOOD PRESSURE: 116 MMHG | HEART RATE: 94 BPM | HEIGHT: 67 IN | BODY MASS INDEX: 29.13 KG/M2 | WEIGHT: 185.6 LBS | TEMPERATURE: 99.1 F | OXYGEN SATURATION: 98 % | DIASTOLIC BLOOD PRESSURE: 72 MMHG

## 2022-02-15 NOTE — TELEPHONE ENCOUNTER
---------------------  From: Hallie Aponte CMA (eRx Pool (02624_[facility]))   To: "[employer]" Message Pool ([address]24_[place]);     Sent: 10/28/2020 1:39:43 PM CDT  Subject: FW: Prescription renewal request       Last seen: 9/15/20 annual   RTC: January    Adderall last filled 9/29/20 #60, 0  Chantix last filled 9/29/20 #60, 0    ---------------------  From: NATALIE HASTINGS  To: [facility]  Sent: 10/28/2020 08:03 a.m. CDT  Subject: Prescription renewal request  NATALIE HASTINGS is requesting renewal of the prescription(s) below and has submitted the following details:  Prescription(s) to be renewed  Medication: Chantix 1 mg oral tablet  Dose: 1 tab(s)  Frequency: 2 times a day  Rx date: 09/29/2020  Prescribed by: Torres Hill MD  Reason for renewal:   Quantity:   Medication: amphetamine-dextroamphetamine 20 mg oral tablet  Dose: 1 tab(s)  Frequency: 2 times a day  Rx date: 09/29/2020  Prescribed by: Torres Hill MD  Reason for renewal:   Quantity:   Medication: azelastine 137 mcg/inh (0.1%) nasal spray  Dose: 2 spray(s)  Frequency: 2 times a day  Rx date: 07/29/2019  Prescribed by: Torres Hill MD  Reason for renewal:   Quantity:   Medication: fluticasone 50 mcg/inh nasal spray  Dose: 2 spray(s)  Frequency: daily  Rx date: 09/29/2020  Prescribed by: Torres Hill MD  Reason for renewal:   Quantity:   Delivery option  Send to my pharmacy  [facility] [place] [address]  Contact Information  By Secure Message---------------------  From: Cecile Ovalles MA ("[employer] Message Pool (90024_[facility]))   To: Torres Hill MD;     Sent: 10/28/2020 1:49:23 PM CDT  Subject: FW: Prescription renewal request  ** Submitted: **  Order:varenicline (Chantix 1 mg oral tablet)  1 tab(s)  Oral  bid  Qty:  60 tab(s)        Days Supply:  30        Refills:  1          Substitutions Allowed     Route To Pharmacy - [facility] #[id number]    Signed by Torres Hill MD   10/28/2020 8:26:00 PM [facility]    ** Submitted: **  Order:amphetamine-dextroamphetamine (amphetamine-dextroamphetamine 20 mg oral tablet)  1 tab(s)  Oral  bid  Qty:  60 tab(s)        Refills:  0          Substitutions Allowed     Route To Pharmacy - [facility] #[id number]    Signed by Torres Hill MD  10/28/2020 8:26:00 PM [facility]---------------------  From: Torres Hill MD   To: [employer] ([address]224_[place]);     Sent: 10/28/2020 3:30:02 PM CDT  Subject: RE: Prescription renewal requestLVM for patient that medication was sent to the pharmacy

## 2022-02-15 NOTE — TELEPHONE ENCOUNTER
---------------------  From: Hallie Aponte CMA (eRx Pool (32224_[facility]))   To: [employer] Message Pool (32224_[place]);     Sent: 6/15/2021 7:11:24 AM CDT  Subject: FW: Prescription renewal request       last filled 5/16/21 #60, 0   rtc this month  last visit 2/17/21 ADD f/u    ---------------------  From: NATALIE HASTINGS  To: [facility]  Sent: 06/14/2021 01:45 p.m. CDT  Subject: Prescription renewal request  NATALIE HASTINGS is requesting renewal of the prescription(s) below and has submitted the following details:  Prescription(s) to be renewed  Medication: amphetamine-dextroamphetamine 20 mg oral tablet  Dose: 1 tab(s)  Frequency: 2 times a day  Rx date: 05/16/2021  Prescribed by: Torres Hill MD  Reason for renewal:   Quantity:   Delivery option  Send to  pharmacy  [facility] [address]  [place] [id number]  Contact Information  By Secure Message---------------------  From: Cecile Ovalles MA ([employer] Message Pool (32224_[facility]))   To: Torres Hill MD;     Sent: 6/15/2021 8:17:50 AM CDT  Subject: FW: Prescription renewal request  ** Submitted: **  Order:amphetamine-dextroamphetamine (amphetamine-dextroamphetamine 20 mg oral tablet)  1 tab(s)  Oral  bid  Qty:  60 tab(s)        Refills:  0          Substitutions Allowed     Route To Pharmacy [facility] #[id number]    Signed by Torres Hill MD  6/15/2021 5:38:00 PM UTC---------------------  From: Torres Hill MD   To: [employer] Message Pool ([address]224_[place]);     Sent: 6/15/2021 12:43:04 PM CDT  Subject: RE: Prescription renewal request---------------------  From: Josr NICHOLAS, Cecile ([employer] Message Pool (32224_[facility]))   To: NATALIE HASTINGS    Sent: 6/15/2021 1:08:58 PM CDT  Subject: FW: Prescription renewal request     Dr. Sergio Stephens received your message and sent in a refill of your Adderall to [facility].  It looks like you will be due for a visit with Dr. Hill next month  for any further refills.      Sincerely,     Cecile BROWN CMA

## 2022-02-15 NOTE — TELEPHONE ENCOUNTER
---------------------  From: Cecile Ovalles MA (eRx Pool (32224_[facility]))   To: [employer] Message Pool ([address]224_[place]);     Sent: 11/23/2021 9:47:45 AM CST  Subject: FW: Medication Management   Due Date/Time: 11/23/2021 6:13:00 PM CST           ------------------------------------------  From: [facility] #[id number]  To: Torres Hill MD  Sent: November 18, 2021 6:13:54 PM CST  Subject: Medication Management  Due: November 16, 2021 3:37:48 PM CST     ** On Hold Pending Signature **     Drug: varenicline (Chantix Starter Pack 0.5 mg-1 mg oral tablet), Take as directed  Quantity: 53 unknown unit  Days Supply: 28  Refills: 0  Substitutions Allowed  Notes from Pharmacy:     Dispensed Drug: varenicline (Chantix Starter Pack 0.5 mg-1 mg oral tablet), Take as directed  Quantity: 53 unknown unit  Days Supply: 28  Refills: 0  Substitutions Allowed  Notes from Pharmacy:  ---------------------------------------------------------------  From: Cecile Ovalles MA ([employer] Message Pool (32224_[facility]))   To: Torres Hill MD;     Sent: 11/23/2021 10:21:46 AM CST  Subject: FW: Medication Management   Due Date/Time: 11/23/2021 6:13:00 PM CST     LV:  10/21/2021 for px.  RTC due Oct 2022 for px; Feb 2022 for ADHD f/u.   Please advise re: refills.---------------------  From: Torres Hill MD   To: [facility] #[id number]    Sent: 11/23/2021 1:12:09 PM CST  Subject: FW: Medication Management     ** Submitted: **  Complete:varenicline (varenicline 0.5 mg-1 mg oral tablet)   Signed by Torres Hill MD  11/23/2021 7:12:00 PM [facility]    ** Approved with modifications: **  varenicline (Chantix Starting Month Giovanny Oral Tablet 0.5 MG X 11 & 1 MG X 42)  Take as directed  Qty:  53 unknown unit        Days Supply:  28        Refills:  0          Substitutions Allowed     Route To Pharmacy - [facility] #[id number]

## 2022-02-15 NOTE — TELEPHONE ENCOUNTER
---------------------  From: Cecile Ovalles MA (eRx Pool (32224_[facility]))   To: NATALIE HASTINGS    Sent: 5/15/2021 11:51:25 AM CDT  Subject: RE: Prescription renewal request     Dr. Sergio Stephens is out of office but I did send him a prescription refill request for your Adderall.  In reviewing your chart, it looks like Dr. Hill sent in refills of your fluticasone and azelastine nasal spray to [facility] on 4/15/2021.  Please check with your pharmacy to see if they do have refills on hand.  If you have any further concerns, please let us know.    Sincerely,     Cecile BROWN CMA      ---------------------  From: NATALIE HASTINGS  To: [facility]  Sent: 05/14/2021 09:07 a.m. CDT  Subject: Prescription renewal request  NATALIE HASTINGS is requesting renewal of the prescription(s) below and has submitted the following details:  Prescription(s) to be renewed  Medication: fluticasone 50 mcg/inh nasal spray  Dose: 2 spray(s)  Frequency: daily  Rx date: 04/15/2021  Prescribed by: Torres Hill MD  Reason for renewal:   Quantity:   Medication: azelastine 137 mcg/inh (0.1%) nasal spray  Dose: 2 spray(s)  Frequency: 2 times a day  Rx date: 04/15/2021  Prescribed by: Torres Hill MD  Reason for renewal:   Quantity:   Medication: amphetamine-dextroamphetamine 20 mg oral tablet  Dose: 1 tab(s)  Frequency: 2 times a day  Rx date: 04/15/2021  Prescribed by: Torres Hill MD  Reason for renewal:   Quantity:   Delivery option  Send to my pharmacy  [facility] [place] [address]  Contact Information  By Secure Message

## 2022-02-15 NOTE — PROGRESS NOTES
Chief Complaint    wt loss f/u. also discuss alternative to Azelastine nasal spray.  History of Present Illness      Anabela is here for follow-up on her weight loss.  She has been taking phentermine 15 mg daily.  She continues to take an increased activity and is closely watching her diet.               She would like a different nasal spray for her chronic rhinitis.  Her insurance did not cover her previous spray.          Review of Systems      See HPI.  All other review of systems negative.  Physical Exam   Vitals & Measurements    T: 98.6(Tympanic)  HR: 96(Peripheral)  BP: 114/74     HT: 67 in  WT: 193.6 lb  BMI: 30.32       Alert, oriented, no acute distress      Nonlabored breathing       Normal heart rate       Cooperative, appropriate affect          Assessment/Plan       Chronic rhinitis         Fluticasone nasal spray 2 sprays each nostril once daily       Obesity         Weight is down 6 pounds        Continue with current dose of phentermine         Increase to 15 mg twice daily if she plateaus with her weight.                 Orders:         fluticasone nasal, 2 spray(s), nasal, daily, # 1 EA, 11 Refill(s), Type: Maintenance, Pharmacy: [facility] #[id number], 2 spray(s) nasal daily         phentermine, 1 cap(s) ( 15 mg ), PO, qam, x 30 day(s), # 30 cap(s), 0 Refill(s), Type: Hard Stop, Pharmacy: [facility] #[id number]  Patient Information     Name:ANABELA HASTINGS      Address:      [address] [place] [id number]     Sex:Female     YOB: 1980     [phone]     Emergency Contact:ANU HASTINGS     MRN:[id number]     FIN:[id number]     Location:[facility]     Date of Service:11/16/2017      Primary Care Physician:       Torres Hill MD, [phone]  Problem List/Past Medical History    Ongoing     Obesity    Historical  Medications    Chantix 1 mg oral tablet, 1 mg= 1 tab(s), po, bid, 2 refills,   Not taking    fluticasone 50 mcg/inh nasal spray, 2  spray(s), nasal, daily, 11 refills    multivitamin with minerals (w/ Iron)    phentermine 15 mg oral capsule, 15 mg= 1 cap(s), po, qam    Zyrtec 10 mg oral tablet, 10 mg= 1 tab(s), po, daily  Allergies    penicillin (Fever.., flenching)    sulfa drug (Itching)  Social History    Smoking Status - 11/16/2017     Light tobacco smoker     Alcohol - 07/17/2017      Current, 3 times per week, 1 drinks/episode average. 2 drinks/episode maximum.     Tobacco - 07/17/2017      Current (some day smoker)  Family History    Anxiety: Sister.    Depression: Mother, Father and Sister.  Immunizations      Vaccine Date Status      Hep B 02/02/2004 Recorded      typhoid, inactivated 02/02/2004 Recorded      Hep A 02/02/2004 Recorded      Hep B 02/11/2002 Recorded      Hep B 11/16/2001 Recorded      Td 07/20/2000 Recorded      OPV 10/14/1982 Recorded      DTaP 10/14/1982 Recorded      MMR (measles/mumps/rubella) 08/12/1982 Recorded  Lab Results   Results (Last 90 days)   No results located.

## 2022-02-15 NOTE — TELEPHONE ENCOUNTER
---------------------  From: Cecile Ovalles MA (eRx Pool (32224_[facility]))   To: Torres Hill MD;     Sent: 12/2/2021 9:41:36 AM CST  Subject: FW: Prescription renewal request     RTC due Feb 2022 for ADHD f/u.      ---------------------  From: NATALIE HASTINGS  To: [facility]  Sent: 12/02/2021 08:47 a.m. CST  Subject: Prescription renewal request  NATALIE HASTINGS is requesting renewal of the prescription(s) below and has submitted the following details:  Prescription(s) to be renewed  Medication: amphetamine-dextroamphetamine 20 mg oral tablet  Dose: 1 tab(s)  Frequency: 2 times a day  Rx date: 10/21/2021  Prescribed by: Torres Hill MD  Reason for renewal:   Quantity:   Delivery option  Send to [facility]  [address] [place] [address]  Contact Information  By Secure Message  ** Submitted: **  Order:amphetamine-dextroamphetamine (amphetamine-dextroamphetamine 20 mg oral tablet)  1 tab(s)  Oral  bid  Qty:  60 tab(s)        Refills:  0          Substitutions Allowed     Route To [facility] #[id number]    Signed by Torres Hill MD  12/2/2021 4:02:00 PM [facility] [[email or web address]]  ** Submitted: **  Order:amphetamine-dextroamphetamine (amphetamine-dextroamphetamine 20 mg oral tablet)  1 tab(s)  Oral  bid  Qty:  60 tab(s)        Refills:  0          Substitutions Allowed     Route To [facility] #[id number]    Signed by Torres Hill MD  12/2/2021 4:03:00 PM UT---------------------  From: Torres Hill MD   To: eRx Pool ([address]_[place]);     Sent: 12/2/2021 10:04:36 AM CST  Subject: RE: Prescription renewal request---------------------  From: Cecile Ovalles MA (eRx Pool (32224_[facility])   To: NATALIE HASTINGS    Sent: 12/2/2021 12:21:16 PM CST  Subject: FW: Prescription renewal request     Dr. Sergio Stephens sent in refills of your Adderall.    Sincerely,     Cecile BROWN CMA   Detail Level: Zone

## 2022-02-15 NOTE — TELEPHONE ENCOUNTER
---------------------  From: Torres Hill MD   To: NATALIE HASTINGS    Sent: 6/21/2019 9:00:16 AM CDT  Subject: Patient Message - Results Notification        Your cholesterol results are all in the normal range.    Results:  Date Result Name Value Ref Range   6/20/2019 2:59 PM Glucose Level 85 mg/dL (65 - 99)   6/20/2019 2:59 PM Cholesterol 170 mg/dL ( - <200)   6/20/2019 2:59 PM Non-HDL Cholesterol 108 ( - <130)   6/20/2019 2:59 PM HDL 62 mg/dL (>50 - )   6/20/2019 2:59 PM Cholesterol/HDL Ratio 2.7 ( - <5.0)   6/20/2019 2:59 PM LDL 90    6/20/2019 2:59 PM Triglyceride 87 mg/dL ( - <150)

## 2022-02-15 NOTE — PROGRESS NOTES
Chief Complaint    f/u wt loss.  History of Present Illness      Patient is here for follow-up on her obesity.  She had a setback this last month with a couple pound weight gain.  She is working with Grecia Daniel on dietary modifications.  She finds eating in the evening is her biggest challenge.  In the past she had been on 50 mg of phentermine twice daily with good success.       Fluticasone nasal spray is not controlling her rhinitis symptoms.  She has used is azelastine previously with good results.  Review of Systems      See HPI.  All other review of systems negative.  Physical Exam   Vitals & Measurements    T: 98(Tympanic)  HR: 80(Peripheral)  BP: 110/72     HT: 67 in  WT: 198 lb  BMI: 31.01       Alert, oriented, no acute distress       Normal heart rate       Nonlabored breathing  Assessment/Plan       Chronic rhinitis         Trial of azelastine nasal spray 2 sprays each nostril once daily       Obesity         Increase phentermine to 50 mg twice daily, follow-up in 1 month       Orders:         azelastine nasal, 2 spray(s), nasal, bid, # 1 EA, 3 Refill(s), Type: Maintenance, Pharmacy: [id number], 2 spray(s) nasal bid         phentermine, 1 cap(s) ( 15 mg ), PO, qam, x 30 day(s), # 30 cap(s), 0 Refill(s), Type: Hard Stop, Pharmacy: [id number]         phentermine, 1 cap(s) ( 15 mg ), PO, bid, # 60 cap(s), 0 Refill(s), Type: Maintenance, Pharmacy: [id number], 1 cap(s) po bid,x30 day(s)  Patient Information     Name:NATALIE HASTINGS      Address:      [address] [place] [id number]     Sex:Female     YOB: 1980     [phone]     Emergency Contact:ANU HASTINGS     MRN:[id number]     FIN:[id number]     Location:[facility]     Date of Service:12/18/2017      Primary Care Physician:       Torres Hill MD, [phone]  Problem List/Past Medical History    Ongoing     Obesity    Historical  Medications        azelastine 137  mcg/inh (0.1%) nasal spray: 2 spray(s), nasal, bid, 1 EA, 3 Refill(s).        Zyrtec 10 mg oral tablet: 10 mg, 1 tab(s), po, daily.        fluticasone 50 mcg/inh nasal spray: 2 spray(s), nasal, daily, 1 EA, 11 Refill(s).        multivitamin with minerals (w/ Iron): 0 Refill(s).        phentermine 15 mg oral capsule: 15 mg, 1 cap(s), PO, bid, for 30 day(s), 60 cap(s), 0 Refill(s).        Chantix 1 mg oral tablet: 1 mg, 1 tab(s), po, bid, 60 tab(s), 2 Refill(s).                Allergies    penicillin (Fever.., flenching)    sulfa drug (Itching)  Social History    Smoking Status - 12/18/2017     Light tobacco smoker     Alcohol - 07/17/2017      Current, 3 times per week, 1 drinks/episode average. 2 drinks/episode maximum.     Tobacco - 07/17/2017      Current (some day smoker)  Family History    Anxiety: Sister.    Depression: Mother, Father and Sister.  Immunizations      Vaccine Date Status      typhoid, inactivated 02/02/2004 Recorded      Hep B 02/02/2004 Recorded      Hep A 02/02/2004 Recorded      Hep B 02/11/2002 Recorded      Hep B 11/16/2001 Recorded      Td 07/20/2000 Recorded      OPV 10/14/1982 Recorded      DTaP 10/14/1982 Recorded      MMR (measles/mumps/rubella) 08/12/1982 Recorded  Lab Results      Results (Last 90 days)      No results located.

## 2022-02-15 NOTE — TELEPHONE ENCOUNTER
Entered by Crystal Oquendo CMA on November 24, 2020 1:10:41 PM CST  Last annual exam in Sept-RTC in place and pt is to return in Jan for med check. I refilled the Fluticasone and Azelastine for # 3 ea. Pls advise re: Chantix and Adderall. GTG out until next week.       ---------------------  From: NATALIE HASTINGS  To: [facility]  Sent: 11/24/2020 09:31 a.m. CST  Subject: Prescription renewal request  NATLAIE HASTINGS is requesting renewal of the prescription(s) below and has submitted the following details:  Prescription(s) to be renewed  Medication: azelastine 137 mcg/inh (0.1%) nasal spray  Dose: 2 spray(s)  Frequency: 2 times a day  Rx date: 10/29/2020  Prescribed by: Torres Hill MD  Reason for renewal:   Quantity:   Medication: Chantix 1 mg oral tablet  Dose: 1 tab(s)  Frequency: 2 times a day  Rx date: 10/28/2020  Prescribed by: Torres Hill MD  Reason for renewal:   Quantity:   Medication: amphetamine-dextroamphetamine 20 mg oral tablet  Dose: 1 tab(s)  Frequency: 2 times a day  Rx date: 10/28/2020  Prescribed by: Trores Hill MD  Reason for renewal:   Quantity:   Medication: fluticasone 50 mcg/inh nasal spray  Dose: 2 spray(s)  Frequency: daily  Rx date: 10/29/2020  Prescribed by: Torres Hill MD  Reason for renewal:   Quantity:   Delivery option  Send to my pharmacy  [facility] [place] [address]  Contact Information  Home Phone: [phone]  Mobile Phone: [phone]---------------------  From: Crystal Oquendo CMA (eRx Pool ([facility]224_[facility]))   To: Advanced Practice Provider [place] ([address]);     Sent: 11/24/2020 1:10:46 PM CST  Subject: FW: Prescription renewal request---------------------  From: Mark Lopes PA-C (Advanced Practice Provider Pool ([address]))   To: eRx Pool (32224_[place] [facility] [place]);     Sent: 11/24/2020 1:53:52 PM CST  Subject: RE: Prescription renewal request     Adderall is refilled for one  month  Chantix was refilled in Oct for a month plus a refill, so there should be refill available for thatmsg sent to pt via portal

## 2022-02-15 NOTE — TELEPHONE ENCOUNTER
---------------------  From: Maryanne Maguire CMA (Phone Messages Pool ([id number]_[facility]))   To: NATALIE HASTINGS    Sent: 9/25/2019 6:08:54 PM CDT  Subject: RE: Medication Follow up - next month     Otf Peñaloza,    You'll be due for an office visit. Please schedule at your convenience.     Kind Maryanne Arceo CMA      ---------------------  From: NATALIE HASTINGS  To: [facility]  Sent: 09/25/2019 05:50 p.m. CDT  Subject: Medication Follow up - next month  Good Afternoon Cecile -  Per your 9/11 email.  Should I schedule the medication follow up next month as an office apt, or can the follow up occur thru the portal.  Please advise.    Thank you!

## 2022-02-15 NOTE — TELEPHONE ENCOUNTER
---------------------  From: Raf BAPTISTE, Danica KERN (eRx Pool ([address]224_[facility]))   To: NATALIE HASTINGS    Sent: 11/8/2019 9:50:08 AM CST  Subject: FW: Prescription renewal request     Otf Peñaloza,    There were 2 prescriptions sent in on 10/10/19. Please check with the pharmacy as they should have 1 on file for you to fill for the month of November.    Danica Noble LPN        ---------------------  From: NATALIE HASTINGS  To: [facility]  Sent: 11/08/2019 07:16 a.m. CST  Subject: Prescription renewal request  NATALIE HASTINGS is requesting renewal of the prescription(s) below and has submitted the following details:  Prescription(s) to be renewed  Medication: amphetamine-dextroamphetamine 15 mg oral tablet  Dose: 1 tab(s)  Frequency: 2 times a day  Rx date: 10/10/2019  Prescribed by: Torres Hill MD  Reason for renewal:   Quantity:   Delivery option  Send to my pharmacy  [address] [place] [address]  Contact Information  Home Phone: [phone]  Mobile Phone: [phone]

## 2022-02-15 NOTE — TELEPHONE ENCOUNTER
---------------------  From: Hallie Aponte CMA (eRx Pool ([address]224_[facility]))   To: [email or web address] Message Pool ([address]224_[place]);     Sent: 12/9/2019 1:42:24 PM CST  Subject: CONSUMER MESSAGE: Prescription renewal request     Adderall refill request - last filled 10/10/19 #60, 0 refills  Last seen 10/10/19; ADD  RTC: February      ---------------------  From: NATALIE HASTINGS  To: [facility]  Sent: 12/08/2019 12:57 p.m. CST  Subject: Prescription renewal request  NATALIE HASTINGS is requesting renewal of the prescription(s) below and has submitted the following details:  Prescription(s) to be renewed  Medication: azelastine 137 mcg/inh (0.1%) nasal spray  Dose: 2 spray(s)  Frequency: 2 times a day  Rx date: 07/29/2019  Prescribed by: Torres Hill MD  Reason for renewal:   Quantity:   Medication: amphetamine-dextroamphetamine 15 mg oral tablet  Dose: 1 tab(s)  Frequency: 2 times a day  Rx date: 10/10/2019  Prescribed by: Torres Hill MD  Reason for renewal:   Quantity:   Medication: fluticasone 50 mcg/inh nasal spray  Dose: 2 spray(s)  Frequency: daily  Rx date: 09/19/2019  Prescribed by: Torres Hill MD  Reason for renewal:   Quantity:   Delivery option  [place] [address]  Contact Information  Home Phone: [phone]  Mobile Phone: [phone]---------------------  From: Cecile Ovalles MA ([email or web address] Message Pool ([address]_[facility]))   To: Torres Hill MD;     Sent: 12/9/2019 1:56:46 PM CST  Subject: FW: CONSUMER MESSAGE: Prescription renewal request  ** Submitted: **  Order:amphetamine-dextroamphetamine (amphetamine-dextroamphetamine 15 mg oral tablet)  1 tab(s)  Oral  bid  Qty:  60 tab(s)        Duration:  30 day(s)        Refills:  0          Substitutions Allowed     Route To Pharmacy - [id number]    Signed by Torres Hill MD  12/9/2019 3:49:00 PM    ** Submitted: **  Order:azelastine nasal (azelastine  137 mcg/inh (0.1%) nasal spray)  2 spray(s)  Nasal  bid  Qty:  3 EA        Refills:  0          DEVIN     Route To [facility] #[id number]    Signed by Torres Hill MD  12/9/2019 3:49:00 PM  ** Submitted: **  Order:amphetamine-dextroamphetamine (amphetamine-dextroamphetamine 15 mg oral tablet)  1 tab(s)  Oral  bid  Qty:  60 tab(s)        Duration:  30 day(s)        Refills:  0          Substitutions Allowed     Route To [facility] #[id number]    Signed by Torres Hill MD  12/9/2019 3:51:00 PM

## 2022-02-15 NOTE — LETTER
(Inserted Image. Unable to display)   September 15, 2021  NATALIE HASTINGS  [address] [place] [address]          Dear NATALIE,      Thank you for selecting [facility] for your healthcare needs.    Our records indicate you are due for the following services:     Annual Physical    (FYI   Regarding office visits: In some instances, a video visit or telephone visit may be offered as an option.)        To schedule an appointment or if you have further questions, please contact your clinic at [phone].      Powered by [employer]    Sincerely,    Torres Hill M.D.

## 2022-02-15 NOTE — PROGRESS NOTES
Chief Complaint    med check.  also discuss Chantix and C19 vaccination.  History of Present Illness      Patent presents for attention deficit disorder follow up.  There have been no problems with the medication. The current dose is controlling symptoms. There are no other concerns.      She would like to restart Chantix, smoking about 1 ppd      Requests SARS-CoV-2 vaccine, had illness in early April  Review of Systems          ROS reviewed and negative except for symptoms noted in HPI.  Physical Exam   Vitals & Measurements    T: 98.2  F (Tympanic)  HR: 92 (Peripheral)  BP: 121/76  SpO2: 99%     HT: 67 in  WT: 173 lb  BMI: 27.09             General:  Alert and oriented, No acute distress.             Eye: Normal conjunctiva.             HENT:  Oral mucosa is moist.             Neck:  Supple.             Respiratory:  Respirations are non-labored.             Cardiovascular:  Normal rate           Musculoskeletal:  Normal gait.             Psychiatric:  Cooperative, Appropriate mood & affect, Normal judgment.  Assessment/Plan       1. ADD (attention deficit disorder) (F98.8)         Doing well, continue same dose        refill for 2 months        follow up in 4 months        PDMP queried, no concerns                2. Tobacco user (Z72.0)         resume Chantix         Ordered:          SARS-CoV-2 (COVID-19) mRNA-1273 vaccine, 0.5 mL, IM, once, (Completed)          0011A imm admn sarscov2 100mcg/0.5ml 1st dose (Charge), Quantity: 1, Tobacco user  Encounter for immunization          67460 sarscov2 vaccine 100mcg/0.5ml im use (Charge), Quantity: 1, Tobacco user  Encounter for immunization                3. Encounter for immunization (Z23)         Ordered:          SARS-CoV-2 (COVID-19) mRNA-1273 vaccine, 0.5 mL, IM, once, (Completed)          0011A imm admn sarscov2 100mcg/0.5ml 1st dose (Charge), Quantity: 1, Tobacco user  Encounter for immunization          59415 sarscov2 vaccine 100mcg/0.5ml im use (Charge),  Quantity: 1, Tobacco user  Encounter for immunization                Orders:         amphetamine-dextroamphetamine, = 1 tab(s) ( 20 mg ), Oral, bid, # 60 tab(s), 0 Refill(s), Type: Maintenance, Pharmacy: [id number], 1 tab(s) Oral bid, 67, in, 07/07/21 10:16:00 CDT, Height Measured, 173, lb, 07/07/21 10:16:00 CDT, Weight Measured, (Ordered)         amphetamine-dextroamphetamine, = 1 tab(s) ( 20 mg ), Oral, bid, # 60 tab(s), 0 Refill(s), Type: Hard Stop, Pharmacy: [facility] #[id number], 67, in, 02/17/21 9:00:00 CST, Height Measured, 178.6, lb, 02/17/21 9:00:00 CST, Weight Measured, (Completed)         varenicline, See Instructions, Instructions: Take as directed, # 1 kit(s), 0 Refill(s), Type: Maintenance, Pharmacy: [facility] #[id number], Take as directed, 67, in, 07/07/21 10:16:00 CDT, Height Measured, 173, lb, 07/07/21 10:16:00 CDT, Weight Measured, (Ordered)         varenicline, = 1 tab(s) ( 1 mg ), Oral, bid, # 60 tab(s), 3 Refill(s), Type: Acute, Pharmacy: [facility] #[id number], 1 tab(s) Oral bid, 67, in, 07/07/21 10:16:00 CDT, Height Measured, 173, lb, 07/07/21 10:16:00 CDT, Weight Measured, (Ordered)         Return to Clinic (Request), RFV: ADHD f/u, Return in 2 months  Patient Information     Name:NATALIE HASTINGS      Address:      [address] [place] [id number]     Sex:Female     YOB: 1980     [phone]     Emergency Contact:ANU HASTINGS     MRN:[id number]     FIN:[id number]     Location:[facility]     Date of Service:07/07/2021      Primary Care Physician:       Torres Hill MD, [phone]      Attending Physician:       Sergio ANDRE, Torres, [phone]  Problem List/Past Medical History    Ongoing     ADD (attention deficit disorder)     Chronic rhinitis     Obesity     Tobacco user    Historical     Pregnancy  Procedure/Surgical History     Date of last PAP test (06/20/2019)           Tonsillectomy and adenoidectomy        Medications     amphetamine-dextroamphetamine 20 mg oral tablet, 20 mg= 1 tab(s), Oral, bid    azelastine 137 mcg/inh (0.1%) nasal spray, 2 spray(s), Nasal, bid, 1 refills    azelastine 137 mcg/inh (0.1%) nasal spray, 2 spray(s), Nasal, bid, 1 refills    fluticasone 50 mcg/inh nasal spray, 2 spray(s), Nasal, daily, 1 refills    multivitamin with minerals (w/ Iron)    varenicline 0.5 mg-1 mg oral tablet, See Instructions    varenicline 1 mg oral tablet, 1 mg= 1 tab(s), Oral, bid, 3 refills    Zyrtec 10 mg oral tablet, 10 mg= 1 tab(s), Oral, daily  Allergies    penicillin (flenching, Fever..)    sulfa drug (Itching)  Social History    Smoking Status     Current every day smoker     Alcohol - Current      Beer (12 oz), Wine (5 oz), Liquor (Hard) (1.5 oz), 3-5 times per week, 1 drinks/episode average. 2 drinks/episode maximum. Ready to change: No.     Electronic Cigarette/Vaping      Electronic Cigarette Use: Never.     Employment/School      Work/School description: . Highest education level: 4 yr degree.     Exercise - Occasional exercise      Exercise frequency: 3-4 times/week. Exercise type: Aerobics.     Home/Environment      Marital status: Single. Lives with Children, Roomate(s)/Friend(s).     Nutrition/Health      Type of diet: Regular. Wants to lose weight: No. Sleeping concerns: No. Feels highly stressed: No.     Other      Regular menses.     Sexual      Sexually active: No. Identifies as female, Sexual orientation: Straight or heterosexual. History of STD: No. Contraceptive Use Details: Intrauterine device. History of sexual abuse: No.     Substance Abuse - Past     Tobacco - Current      4 or less cigarettes(less than 1/4 pack)/day in last 30 days, Cigarettes, Total pack years: 20. Ready to change: Yes.  Family History    Anxiety: Sister.    Breast cancer: Aunt (M).    Depression: Mother, Father and Sister.    Heart disease: Father.    Hypercholesterolemia: Father.    Migraine: Sister.    Skin  cancer: Father.  Immunizations       Scheduled Immunizations       Dose Date(s)       DTaP       10/14/1982       Hep B       02/11/2002, 11/16/2001, 02/02/2004       MMR (measles/mumps/rubella)       08/12/1982       OPV       10/14/1982       Td       07/20/2000       Other Immunizations               Hep A       02/02/2004       typhoid, inactivated       02/02/2004       influenza virus vaccine, inactivated       09/15/2020       Td       08/08/2011       tetanus/diphth/pertuss (Tdap) adult/adol       08/08/2011       SARS-CoV-2 (COVID-19) Moderna-1273       07/07/2021

## 2022-02-15 NOTE — TELEPHONE ENCOUNTER
Entered by Mark Anthony Mcclure CMA on September 10, 2019 4:17:37 PM CDT  PCP:   [facility]    Medication:   amphetamine-dextroamphetamine  Last Filled:  8-12-19    Quantity:  60  Refills:  0    Date of last office visit and reason:   6-25-19 Px    Date of last Med Check / Px:     Date of last labs pertaining to condition:      Note:  Please advise on refills and FU needed.  No CSA or drug screen in EMR.    Return to Clinic order placed?  no    Resource:   _  Phone:   _  Fax:  _          ---------------------  From: NATALIE HASTINGS  To: [facility]  Sent: 09/10/2019 09:52 a.m. CDT  Subject: Prescription renewal request  NATALIE HASTINGS is requesting renewal of the prescription(s) below and has submitted the following details:  Prescription(s) to be renewed  Medication: amphetamine-dextroamphetamine 15 mg oral tablet  Dose: 1 tab(s)  Frequency: 2 times a day  Rx date: 08/12/2019  Prescribed by: Torres Hill MD  Reason for renewal:   Quantity:   Delivery option  Send to my pharmacy  [id number]  [address] [place] [address]  Phone: [phone]  Contact Information  By Secure Message---------------------  From: Mark Anthony Mcclure CMA (eRx Pool ([address]))   To: [employer] Message Pool ([facility]);     Sent: 9/10/2019 4:17:45 PM CDT  Subject: FW: Prescription renewal request---------------------  From: Cecile Ovalles MA (GTG Message Pool ([address]))   To: Advanced Practice Provider Pool ([id number]);     Sent: 9/11/2019 12:47:33 PM CDT  Subject: FW: Prescription renewal request     Please advise re: refill.  RTC placed 9/11/19 for ADHD f/u due next month.  Will have pt sign CSA at visit.---------------------  From: Moses CASILLAS, Mark BAXTER (Advanced Practice Provider Pool (32224_[facility]))   To: [employer] Message Pool (32224_[place] [facility] [place]);     Sent: 9/11/2019 1:05:37 PM CDT  Subject: RE: Prescription renewal request     Rx sent in, [place]  PDMP consulted, no irregularities notedMessage sent to pt via portal.

## 2022-02-15 NOTE — TELEPHONE ENCOUNTER
---------------------  From: Danica Carbone LPN (eRx Pool (32224_[facility]))   To: GTG Message Pool ([facility]24[facility]);     Sent: 8/13/2021 9:55:38 AM CDT  Subject: FW: Prescription renewal request     Per note 7/7/21- it says refilled for 2 months. Only 1 Rx sent in. Please advise on further refill      ---------------------  From: NATALIE HASTINGS  To: [facility]  Sent: 08/11/2021 03:54 p.m. CDT  Subject: Prescription renewal request  NATALIE HASTINGS is requesting renewal of the prescription(s) below and has submitted the following details:  Prescription(s) to be renewed  Medication: amphetamine-dextroamphetamine 20 mg oral tablet  Dose: 1 tab(s)  Frequency: 2 times a day  Rx date: 07/07/2021  Prescribed by: Torres Hill MD  Reason for renewal:   Quantity:   Delivery option  Send to my pharmacy  [facility]  [address] [place] [address]  Contact Information  By Secure Message---------------------  From: Fermin Link LPN ([employer] Pool ([address]))   To: Torres Hill MD;     Sent: 8/16/2021 8:09:07 AM CDT  Subject: FW: Prescription renewal request  ** Submitted: **  Order:amphetamine-dextroamphetamine (amphetamine-dextroamphetamine 20 mg oral tablet)  1 tab(s)  Oral  bid  Qty:  60 tab(s)        Refills:  0          Substitutions Allowed     Route To Pharmacy - [facility] #[id number]    Signed by Torres Hill MD  8/16/2021 3:51:00 PM [facility]---------------------  From: Torres Hill MD   To: GTG Message Pool ([address]_[place]);     Sent: 8/16/2021 10:53:03 AM CDT  Subject: RE: Prescription renewal request

## 2022-02-15 NOTE — PROGRESS NOTES
"   Patient:   NATALIE HASTINGS            MRN: [id number]            FIN: [id number]               Age:   36 years     Sex:  Female     :  1980   Associated Diagnoses:   Obesity   Author:   Lucia Daniel      Visit Information   Visit type:  Medical Nutrition Therapy.    Referral source:  Torres Hill MD.       Chief Complaint   Obesity       Interval History   Pt is here today for follow up weight loss education.  Pt has lost 12# with gradual progress since initial RD visit on 17.    Pt had been on Phentermine 13 starting weight os 205.8# and lost ~31# but regained it all.    Nutrition: Using myfitnesspal to record intake.  Pt states she has had some big eye openers when going out to eat and seeing the high amount of calories and large portion sizes.  She is now more aware and in tune to the correct portion sizes and more mindful of hunger and fullness cues.  Pt has let herself get too hungry which results in overeating.  Has started cooking more on Sundays for the week to be more prepared.  Variety- increasing fruit and vegetables is one area pt can continue to improve upon.    Exercise: decreased, knows this is one area to work on   sleep: fair   Stress: moderate, work, son, \"life\"       Health Status   Allergies:    Allergic Reactions (Selected)  Severity Not Documented  Penicillin (Fever.. and flenching)  Sulfa drug (Itching)   Medications:  (Selected)   Prescriptions  Prescribed  Chantix 1 mg oral tablet: 1 tab(s) ( 1 mg ), po, bid, # 60 tab(s), 2 Refill(s), Type: Maintenance, Pharmacy: [facility] #[id number], 1 tab(s) po bid  fluticasone 50 mcg/inh nasal spray: 2 spray(s), nasal, daily, # 1 EA, 11 Refill(s), Type: Maintenance, Pharmacy: [facility] #[id number], 2 spray(s) nasal daily  phentermine 15 mg oral capsule: 1 cap(s) ( 15 mg ), PO, qam, # 30 cap(s), 0 Refill(s), Type: Maintenance, Pharmacy: [facility] #[id number], 1 cap(s) po qam,x30 day(s)  Documented Medications  Documented  Zyrtec 10 mg oral " tablet: 1 tab(s) ( 10 mg ), po, daily, 0 Refill(s), Type: Maintenance  multivitamin with minerals (w/ Iron): 0 Refill(s), Type: Maintenance   Problem list:    All Problems  Obesity / SNOMED CT 8212618446 / Probable      Histories   Past Medical History:    No active or resolved past medical history items have been selected or recorded.   Family History:    Anxiety  Sister  Depression  Mother  Father  Sister     Procedure history:    No active procedure history items have been selected or recorded.   Social History:        Alcohol Assessment            Current, 3 times per week, 1 drinks/episode average.  2 drinks/episode maximum.      Tobacco Assessment            Current (some day smoker)        Physical Examination   Measurements from flowsheet : Measurements   12/14/2017 5:59 PM CST Height Measured - Standard 67 in    Weight Measured - Standard 196 lb    BSA 2.05 m2    Body Mass Index 30.69 kg/m2         Impression and Plan   Diagnosis     Obesity (BSA32-RV E66.09).       Ongoing motivational counseling for weight loss.  Discussed hunger/ fullness cues and to provided new  chart.  Education on eating well through the holidays and avoiding holiday weight gain.  Continue with mindful eating and continue keeping track of intake on an jo to become more aware of balance at meals and overall calorie intake.  Discussed option to try sparkpeople where menus, recipes and grocery shopping lists provided.  Provided additional meal and snack ideas.    Education of the following topis:  - Myplate, ~1250 calorie meal plan, portion sizes, label reading, bring quart size ziplock back with veggies 3x/ week, water!!! metamucil BID  - weight loss tips, mindful eating, motivational tips with reward system  - [facility] ([facility])  - exercise recommendations FÉLIX - pt may like   - 75 - 90 gm protein/ day  - increase egg whites, legumes, salmon, tuna  - fruit 2+ x/ day  - 3 food groups/ meal   - wear tennis shoes for  walking  - try meal replacement drink    Goals:   1.  Practice healthy stress management and get good quality sleep with the goal of 7-8 hours per night.    2.  Increase physical activity and make this a part of a daily routine.  Recommend 30 minutes of moderately intense physical activity 5 or more days per week.  Stay physically active on a daily basis and throughout the year.  Recommend a pedometer; gradually increase the average to a minimum of 6000 steps with the ultimate goal of 10,000 steps per day.    3.  Eat in a healthy way, per food plate method .  Keep a food record.  Eat 3 meals/ day.  A meal is three or more food groups; make it colorful for better nutrition.  Focus on portion control.  ~1250 calorie meal plan.  Follow weight loss tips and mindful eating.    4.  Goal weight revised - did not meet goal, 177 by 7/2018  5.  Read handouts provided.  Follow up in 2 months       Professional Services   Time spent with pt 30 min   cc Dr. Hill

## 2022-02-15 NOTE — TELEPHONE ENCOUNTER
---------------------  From: Sarah Garcia LPN (Phone Messages Pool (32224_[facility]))   To: [employer] Pool (32224_[facility]); NATALIE BARTHOLOMEW    Sent: 6/24/2019 8:40:01 AM CDT  Subject: FW: RESEND     Otf KimbleNatalie,  I am forwarding your message onto Dr. Hill for review tomorrow when he is back in clinic.    Thanks,  Sarah        ---------------------  From: NATALIE BARTHOLOMEW  [facility]  Sent: 06/21/2019 05:21 p.m. CDT  Subject: RESEND  Hi Doc -    The current medication & dosage is Methylphenidate HCI ER 36 MG (Concerta).  & he had been referred to [facility], [address] [place] [address], [facility] [address] [address], for his assessment.    Thank you!    Natalie Bartholomew---------------------  From: Cecile Ovalles MA ([employer] Message Pool (32224_[facility]))   To: Torres Hill MD;     Sent: 6/25/2019 8:09:06 AM CDT  Subject: FW: RESEND---------------------  From: Torres Hill MD   To: [employer] Pool ([address]224_[place] [facility] [place]);     Sent: 6/25/2019 8:33:05 AM CDT  Subject: RE: RESEND     I will start her at Concerta dose of 27 mg a day.  OK to proceed with a referral to her to [facility] for ADD evaluation  ** Submitted: **  Order:methylphenidate (methylphenidate 27 mg/24 hr oral tablet, extended release)  1 tab(s)  Oral  qam  Qty:  14 tab(s)        Duration:  14 day(s)        Refills:  0          Substitutions Allowed     Route To Pharmacy - [facility] #[id number]    Signed by Torres Hill MD  6/25/2019 8:33:00 AMWill have her start with a 2 week supply---------------------  From: Torres Hill MD   To: [employer] Pool ([address]224_[place] [facility] [place]);     Sent: 6/25/2019 8:35:31 AM CDT  Subject: RE: RESEND  ** Submitted: **  Order:Referral (Request)  Details:  6/25/2019 9:24 AM CDT, Referred to: Other, Referred to: [facility], Reason for referral: ADD evaluation, Adult ADHD         Signed by Josr NICHOLAS,  Cecile  6/25/2019 9:24:00 AM---------------------  From: Cecile Ovalles MA ([employer] Pool (32224_[facility]))   To: ANABELA HASTINGS    Sent: 6/25/2019 9:29:08 AM CDT  Subject: FW: RESEND     Good MorningAnabela Dr. Goblirsch received your message from yesterday and did send in a prescription that he wants you to try for a couple of weeks. We will also refer you to [facility] for ADD evaluation per Dr. Hill.  One of our referral clerks will be contacting you to help you get set up.  If you have any further concerns, please let us know.    Sincerely,      Cecile BROWN MA/Dr. Hill---------------------  From: Cecile Ovalles MA ([employer] Pool (32224_[facility]))   To: Referral Coordinators Pool (32224_[facility]);     Sent: 6/25/2019 9:29:18 AM CDT  Subject: FW: RESEND

## 2022-02-15 NOTE — TELEPHONE ENCOUNTER
---------------------  From: Lynn Mckeon CMA (Phone Messages Pool (58524_[facility]))   To: [employer] Message Pool ([address]24_[place] [facility] [place]);     Sent: 8/3/2020 12:13:12 PM CDT  Subject: FW: Medication Renewal           ---------------------  From: NATALIE BARTHOLOMEW  To: [facility]  Sent: 08/03/2020 12:10 p.m. CDT  Subject: Medication Renewal  Hello -  I would like to renew 2 medication prescriptions: Dextroamp-amphetamin & Chantix. Please send refills to [facility].    Thank you.    Natalie Bartholomew---------------------  From: Laurie Rodas LPN ([employer] Message Pool (78624_[facility]))   To: Torres Hill MD;     Sent: 8/3/2020 12:14:50 PM CDT  Subject: FW: Medication Renewal

## 2022-02-15 NOTE — TELEPHONE ENCOUNTER
---------------------  From: Naz Cornelius CMA (eRx Pool (32224_[facility]))   To: Torres Hill MD;     Sent: 7/2/2020 2:36:17 PM CDT  Subject: FW: Prescription renewal request       please advise      ---------------------  From: NATALIE HASTINGS  To: [facility]  Sent: 07/02/2020 02:15 p.m. CDT  Subject: Prescription renewal request  NATALIE HASTINGS is requesting renewal of the prescription(s) below and has submitted the following details:  Prescription(s) to be renewed  Medication: Chantix 1 mg oral tablet  Dose: 1 tab(s)  Frequency: 2 times a day  Rx date: 05/12/2020  Prescribed by: Torres Hill MD  Reason for renewal:   Quantity:   Medication: amphetamine-dextroamphetamine 20 mg oral tablet  Dose: 1 tab(s)  Frequency: 2 times a day  Rx date: 06/04/2020  Prescribed by: Torres Hill MD  Reason for renewal:   Quantity:   Delivery option  [place] [address]  Contact Information  By Secure Message  ** Submitted: **  Order:varenicline (Chantix 1 mg oral tablet)  1 tab(s)  Oral  bid  Qty:  60 tab(s)        Refills:  1          Route To [facility] #[id number]    Signed by Torres Hill MD  7/2/2020 10:13:00 PM [facility]    ** Submitted: **  Order:amphetamine-dextroamphetamine (amphetamine-dextroamphetamine 20 mg oral tablet)  1 tab(s)  Oral  bid  Qty:  60 tab(s)        Refills:  0          Substitutions Allowed     Route To [facility] #[id number]    Signed by Torres Hill MD  7/2/2020 10:13:00 PM UT---------------------  From: Torres Hill MD   To: eRx Pool ([address]224_[place] [facility] [place]);     Sent: 7/2/2020 5:14:43 PM CDT  Subject: RE: Prescription renewal request

## 2022-02-15 NOTE — PROGRESS NOTES
"   Patient:   NATALIE HASTINGS            MRN: [id number]            FIN: [id number]               Age:   36 years     Sex:  Female     :  1980   Associated Diagnoses:   Obesity   Author:   Lucia Daniel      Visit Information   Visit type:  Medical Nutrition Therapy.    Referral source:  Torres Hill MD.       Chief Complaint   Obesity       Interval History   Pt is here today for follow up weight loss education.  Pt has lost ~3.5# with gradual progress.    Pt had been on Phentermine 13 starting weight os 205.8# and lost ~31# but regained it all.    Nutrition: Has been writing down most meals.  Pt states her healthy eating days \"ebbs and flows\".  Her HS snacking is biggest area of improvement.  Pt is choosing lower calorie options if any snack at all.  Portion control has also improved.  Variety- increasing fruit and vegetables is one area pt can continue to improve upon.  Pt would like to get a more regular grocery shopping schedule.    Exercise: walks at work with coworkers and also has started jogging 2x/ week   sleep: fair   Stress: moderate       Health Status   Allergies:    Allergic Reactions (Selected)  Severity Not Documented  Penicillin (Fever.. and flenching)  Sulfa drug (Itching)   Medications:  (Selected)   Prescriptions  Prescribed  phentermine 15 mg oral capsule: 1 cap(s) ( 15 mg ), PO, qam, # 30 cap(s), 0 Refill(s), Type: Maintenance, Pharmacy: [facility][id number], 1 cap(s) po qam,x30 day(s)  Documented Medications  Documented  Chantix: po, bid, 0 Refill(s), Type: Maintenance  Zyrtec 10 mg oral tablet: 1 tab(s) ( 10 mg ), po, daily, 0 Refill(s), Type: Maintenance  azelastine-fluticasone 137 mcg-50 mcg/inh nasal spray: 1 spray(s), nasal, bid, 0 Refill(s), Type: Maintenance  fluticasone 50 mcg/inh nasal spray: 1 spray(s), nasal, daily, 0 Refill(s), Type: Maintenance  multivitamin with minerals (w/ Iron): 0 Refill(s), Type: Maintenance   Problem list:    All Problems  Obesity / SNOMED CT " [id number] / Probable      Histories   Past Medical History:    No active or resolved past medical history items have been selected or recorded.   Family History:    Anxiety  Sister  Depression  Mother  Father  Sister     Procedure history:    No active procedure history items have been selected or recorded.   Social History:        Alcohol Assessment            Current, 3 times per week, 1 drinks/episode average.  2 drinks/episode maximum.      Tobacco Assessment            Current (some day smoker)        Physical Examination   Measurements from flowsheet : Measurements   8/14/2017 2:59 PM CDT Height Measured - Standard 67 in    Weight Measured - Standard 204.75 lb    BSA 2.09 m2    Body Mass Index 32.06 kg/m2         Impression and Plan   Diagnosis     Obesity (POP63-QW E66.09).       Ongoing counseling for weight loss.  Discussed hunger/ fullness cues and to use chart provided.  Continue with mindful eating and start keeping track of intake on an jo to become more aware of balance at meals.  Provided additional meal and snack ideas and discussed ways to improve quality of intake.    Education of the following topis:  - Myplate, ~1250 calorie meal plan, portion sizes, label reading, bring quart size ziplock back with veggies 3x/ week, metamucil BID  - weight loss tips, mindful eating, motivational tips with reward system  - [facility] ([facility])  - exercise recommendations FÉLIX - pt may like   - 75 - 90 gm protein/ day  - increase egg whites, legumes, salmon, tuna  - fruit 2+ x/ day  - 3 food groups/ meal   - wear tennis shoes for walking    Goals:   1.  Practice healthy stress management and get good quality sleep with the goal of 7-8 hours per night.    2.  Increase physical activity and make this a part of a daily routine.  Recommend 30 minutes of moderately intense physical activity 5 or more days per week.  Stay physically active on a daily basis and throughout the year.  Recommend a pedometer;  gradually increase the average to a minimum of 6000 steps with the ultimate goal of 10,000 steps per day.    3.  Eat in a healthy way, per food plate method .  Keep a food record.  Eat 3 meals/ day.  A meal is three or more food groups; make it colorful for better nutrition.  Focus on portion control.  ~1250 calorie meal plan.  Follow weight loss tips and mindful eating.    4.  Goal weight 188 by 12/2017  5.  Read handouts provided.  Follow up in 2 months       Professional Services   Time spent with pt 30 min   cc Dr. Hill

## 2022-02-15 NOTE — TELEPHONE ENCOUNTER
---------------------  From: Cecile Ovalles MA ([employer] Pool (32224_[facility]))   To: Torres Hill MD;     Sent: 10/6/2021 10:37:29 AM CDT  Subject: FW: Prescription renewal request     LV:  7/7/2021 for ADHD f/u, med check.  RTC due next month.      ---------------------  From: NATALIE HASTINGS  To: [facility]  Sent: 10/06/2021 07:43 a.m. CDT  Subject: Prescription renewal request  NATALIE HASTINGS is requesting renewal of the prescription(s) below and has submitted the following details:  Prescription(s) to be renewed  Medication: amphetamine-dextroamphetamine 20 mg oral tablet  Dose: 1 tab(s)  Frequency: 2 times a day  Rx date: 08/16/2021  Prescribed by: Torres Hill MD  Reason for renewal:   Quantity:   Delivery option  Send to my pharmacy  [facility]  [address] [place] [address]  Contact Information  By Secure Message  ** Submitted: **  Order:amphetamine-dextroamphetamine (amphetamine-dextroamphetamine 20 mg oral tablet)  1 tab(s)  Oral  bid  Qty:  60 tab(s)        Refills:  0          Substitutions Allowed     Route To Pharmacy - [facility] #[id number]    Signed by Torres Hill MD  10/6/2021 5:27:00 PM [facility]

## 2022-02-15 NOTE — TELEPHONE ENCOUNTER
---------------------  From: Hallie Aponte CMA (eRx Pool ([address] [place] [address]))   To: [employer] Message Pool ([address] [place] [address]);     Sent: 11/26/2019 2:04:23 PM CST  Subject: CONSUMER MESSAGE: Prescription renewal request     10/10/19; ADD adult  10/10/19: #60, 0 refills  RTC - February for ADHD / June for px    ---------------------  From: NATALIE HASTINGS  [facility]  Sent: 11/26/2019 01:33 p.m. CST  Subject: Prescription renewal request  NATALIE HASTINGS is requesting renewal of the prescription(s) below and has submitted the following details:  Prescription(s) to be renewed  Medication: Chantix 1 mg oral tablet  Dose: 1 tab(s)  Frequency: 2 times a day  Rx date: 07/29/2019  Prescribed by: Torres Hill MD  Reason for renewal:   Quantity:   Delivery option  Send to my pharmacy  [facility]  [place] [id number]  Contact Information  By Secure Message---------------------  From: Cecile Ovalles MA ([facility] [employer] ([address] [place] [address]))   To: Torres Hill MD;     Sent: 11/26/2019 2:32:11 PM CST  Subject: FW: CONSUMER MESSAGE: Prescription renewal request---------------------  From: Torres Hill MD   To: [employer] Message Pool ([facility]);     Sent: 11/26/2019 3:39:24 PM CST  Subject: RE: CONSUMER MESSAGE: Prescription renewal request     OK to refill---------------------  From: Cecile Ovalles MA ([facility] [employer] Pool ([address] [place] [address]))   To: NATALIE HASTINGS    Sent: 11/26/2019 4:09:51 PM CST  Subject: FW: CONSUMER MESSAGE: Prescription renewal request     Natalie Morgan Dr. Goblirsch received your message and a prescription refill of the Chantix was sent to [facility] pharmacy.  If you have any further concerns, please let us know.    Have a Happy Thanksgiving,      Cecile BROWN CMA/Dr. Hill

## 2022-02-15 NOTE — TELEPHONE ENCOUNTER
---------------------  From: Cecile Ovalles MA (eRx Pool (32224_[facility]))   To: Torres Hill MD;     Sent: 5/6/2020 1:10:39 PM CDT  Subject: FW: Prescription renewal request     Pt has appt scheduled 5/12/2020 but will be out of med before then.  Please advise.      ---------------------  From: ANABELA HASTINGS  To: [facility]  Sent: 05/06/2020 12:16 p.m. CDT  Subject: Prescription renewal request  ANABELA HASTINGS is requesting renewal of the prescription(s) below and has submitted the following details:  Prescription(s) to be renewed  Medication: amphetamine-dextroamphetamine 20 mg oral tablet  Dose: 1 tab(s)  Frequency: 2 times a day  Rx date: 04/07/2020  Prescribed by: Torres Hill MD  Reason for renewal:   Quantity:   Delivery option  Send to my pharmacy  [facility] [place] [address]  Contact Information  By Secure Message  Comments  Med Check/follow (telemed) apt scheduled  ** Submitted: **  Order:amphetamine-dextroamphetamine (amphetamine-dextroamphetamine 20 mg oral tablet)  1 tab(s)  Oral  bid  Qty:  60 tab(s)        Refills:  0          Substitutions Allowed     Route To Pharmacy [facility][id number]    Signed by Torres Hill MD  5/6/2020 6:46:00 PM    ** Submitted: **  Discontinue:amphetamine-dextroamphetamine (amphetamine-dextroamphetamine 5 mg oral tablet)   Signed by Torres Hill MD  5/6/2020 6:46:00 PM---------------------  From: Torres Hill MD   To: eRx Pool ([address]_[place]);     Sent: 5/6/2020 1:47:23 PM CDT  Subject: RE: Prescription renewal request---------------------  From: Cecile Ovalles MA (eRx Pool (32224_[facility]))   To: ANABELA HASTINGS    Sent: 5/6/2020 1:53:12 PM CDT  Subject: FW: Prescription renewal request     Anabela Morgan Dr. Goblirsch received your message and did send in a prescription refill.  We will see/talk with you on 5/12/2020 for your med check.    Have a  mattie day,         Cecile BROWN CMA

## 2022-02-15 NOTE — TELEPHONE ENCOUNTER
---------------------  From: Ashley Funes RN (Phone Messages Pool ([id number]_[facility]))   To: NATALIE HASTINGS    Sent: 2/10/2021 3:33:42 PM CST  Subject: RE: Upcoming Apt - Virtual?     Otf Peñaloza -    It looks like your appointment with Dr. Hill is scheduled for a face to face in clinic visit.  If you were to schedule a video visit in the future, you would need to be in [place] at the time of the appointment.    Please let us know if you have any questions or concerns.    Thanks,  Ashley ZARAGOZA RN      ---------------------  From: NATALIE HASTINGS  To: [facility]  Sent: 02/10/2021 03:28 p.m. CST  Subject: Upcoming Apt - Virtual?  Hello - Am I able to have my upcoming apt (2/17, 9am) virtually?  (I would conduct it in [place] - if that is pertinent)    Please let me know at your earliest convenience.    Thank you!

## 2022-02-15 NOTE — NURSING NOTE
Comprehensive Intake Entered On:  2/17/2021 9:05 AM CST    Performed On:  2/17/2021 9:00 AM CST by Cecile Ovalles MA               Summary   Chief Complaint :   med check, refill Adderall.   Weight Measured :   178.6 lb(Converted to: 178 lb 10 oz, 81.012 kg)    Height Measured :   67 in(Converted to: 5 ft 7 in, 170.18 cm)    Body Mass Index :   27.97 kg/m2 (HI)    Body Surface Area :   1.95 m2   Systolic Blood Pressure :   142 mmHg (HI)    Diastolic Blood Pressure :   80 mmHg   Mean Arterial Pressure :   101 mmHg   Peripheral Pulse Rate :   94 bpm   BP Site :   Right arm   Pulse Site :   Radial artery   BP Method :   Manual   HR Method :   Manual   Temperature Tympanic :   97.6 DegF(Converted to: 36.4 DegC)  (LOW)    Oxygen Saturation :   98 %   Cecile Ovalles MA - 2/17/2021 9:00 AM CST   Health Status   Allergies Verified? :   Yes   Medication History Verified? :   Yes   Medical History Verified? :   Yes   Pre-Visit Planning Status :   Completed   Tobacco Use? :   Current every day smoker   Cecile Ovalles MA - 2/17/2021 9:00 AM CST   Consents   Consent for Immunization Exchange :   Consent Granted   Consent for Immunizations to Providers :   Consent Granted   Cecile Ovalles MA - 2/17/2021 9:00 AM CST   Meds / Allergies   (As Of: 2/17/2021 9:05:31 AM CST)   Allergies (Active)   penicillin  Estimated Onset Date:   Unspecified ; Reactions:   Fever.., flenching ; Created By:   Cecile Mas; Reaction Status:   Active ; Category:   Drug ; Substance:   penicillin ; Type:   Allergy ; Updated By:   Cecile Mas; Reviewed Date:   6/20/2019 2:05 PM CDT      sulfa drug  Estimated Onset Date:   Unspecified ; Reactions:   Itching ; Created By:   Cecile Mas; Reaction Status:   Active ; Category:   Drug ; Substance:   sulfa drug ; Type:   Allergy ; Updated By:   Cecile Mas; Reviewed Date:   6/20/2019 2:05 PM CDT        Medication List   (As Of: 2/17/2021 9:05:31 AM CST)   Prescription/Discharge Order     amphetamine-dextroamphetamine  :   amphetamine-dextroamphetamine ; Status:   Prescribed ; Ordered As Mnemonic:   amphetamine-dextroamphetamine 20 mg oral tablet ; Simple Display Line:   20 mg, 1 tab(s), Oral, bid, 60 tab(s), 0 Refill(s) ; Ordering Provider:   Torres Hill MD; Catalog Code:   amphetamine-dextroamphetamine ; Order Dt/Tm:   1/20/2021 3:16:00 PM CST          azelastine nasal  :   azelastine nasal ; Status:   Prescribed ; Ordered As Mnemonic:   azelastine 137 mcg/inh (0.1%) nasal spray ; Simple Display Line:   2 spray(s), Nasal, bid, 3 EA, 1 Refill(s) ; Ordering Provider:   Torres Hill MD; Catalog Code:   azelastine nasal ; Order Dt/Tm:   1/20/2021 1:37:51 PM CST          azelastine 137 mcg/inh (0.1%) nasal spray  :   azelastine 137 mcg/inh (0.1%) nasal spray ; Status:   Prescribed ; Ordered As Mnemonic:   azelastine 137 mcg/inh (0.1%) nasal spray ; Simple Display Line:   2 spray(s), Nasal, bid, 30 mL, 1 Refill(s) ; Ordering Provider:   Torres Hill MD; Catalog Code:   azelastine nasal ; Order Dt/Tm:   7/29/2019 5:03:36 PM CDT          fluticasone nasal  :   fluticasone nasal ; Status:   Prescribed ; Ordered As Mnemonic:   fluticasone 50 mcg/inh nasal spray ; Simple Display Line:   2 spray(s), Nasal, daily, 3 EA, 1 Refill(s) ; Ordering Provider:   Torres Hill MD; Catalog Code:   fluticasone nasal ; Order Dt/Tm:   1/20/2021 1:37:52 PM CST          varenicline  :   varenicline ; Status:   Prescribed ; Ordered As Mnemonic:   Chantix 1 mg oral tablet ; Simple Display Line:   1 tab(s), Oral, bid, 60 tab(s), 1 Refill(s) ; Ordering Provider:   Torres Hill MD; Catalog Code:   varenicline ; Order Dt/Tm:   12/22/2020 1:34:54 PM CST            Home Meds    cetirizine  :   cetirizine ; Status:   Documented ; Ordered As Mnemonic:   Zyrtec 10 mg oral tablet ; Simple Display Line:   10 mg, 1 tab(s), po, daily ; Catalog Code:   cetirizine ; Order Dt/Tm:   8/6/2013 2:46:04 PM CDT           multivitamin with minerals  :   multivitamin with minerals ; Status:   Documented ; Ordered As Mnemonic:   multivitamin with minerals (w/ Iron) ; Simple Display Line:   0 Refill(s) ; Catalog Code:   multivitamin with minerals ; Order Dt/Tm:   6/6/2017 8:27:56 AM CDT            ID Risk Screen   Recent Travel History :   No recent travel   Family Member Travel History :   No recent travel   Other Exposure to Infectious Disease :   Unknown   COVID-19 Testing Status :   No positive COVID-19 test   Cecile Ovalles MA - 2/17/2021 9:00 AM CST   Social History   Social History   (As Of: 2/17/2021 9:05:31 AM CST)   Alcohol:  Current      Beer (12 oz), Wine (5 oz), Liquor (Hard) (1.5 oz), 3-5 times per week, 1 drinks/episode average.  2 drinks/episode maximum.  Ready to change: No.   (Last Updated: 7/8/2019 3:01:09 PM CDT by Jenny Alfred)          Tobacco:  Current      4 or less cigarettes(less than 1/4 pack)/day in last 30 days, Cigarettes, Total pack years: 20.  Ready to change: Yes.   (Last Updated: 2/17/2021 9:00:29 AM CST by Cecile Ovalles MA)          Electronic Cigarette/Vaping:        Electronic Cigarette Use: Never.   (Last Updated: 2/16/2021 4:21:48 PM CST by Cecile Ovalles MA)          Substance Abuse:  Past      (Last Updated: 7/8/2019 2:54:30 PM CDT by Jenny Alfred )         Employment/School:        Work/School description: .  Highest education level: 4 yr degree.   (Last Updated: 7/8/2019 3:02:16 PM CDT by Jenny Alfred)          Home/Environment:        Marital status: Single.  Living situation: Home/Independent.  Injuries/Abuse/Neglect in household: No.  Feels unsafe at home: No.  Family/Friends available for support: Yes.   (Last Updated: 7/8/2019 2:55:13 PM CDT by Jenny Alfred)   Marital status: Single.  Lives with Children, Roomate(s)/Friend(s).   (Last Updated: 2/16/2021 4:21:48 PM CST by Feyereisen MA, Cecile)          Nutrition/Health:        Type of diet: Regular.  Wants to  lose weight: No.  Sleeping concerns: No.  Feels highly stressed: No.   (Last Updated: 7/8/2019 3:01:35 PM CDT by Jenny Alfred)          Exercise:  Occasional exercise      Exercise frequency: 3-4 times/week.  Exercise type: Aerobics.   (Last Updated: 7/8/2019 3:01:48 PM CDT by Jenny Alfred)          Sexual:        Sexually active: No.  Identifies as female, Sexual orientation: Straight or heterosexual.  History of STD: No.  Contraceptive Use Details: Intrauterine device.  History of sexual abuse: No.   (Last Updated: 7/8/2019 3:02:58 PM CDT by Jenny Alfred)          Other:        Regular menses.   (Last Updated: 7/8/2019 3:07:41 PM CDT by Jenny Alfred)

## 2022-02-15 NOTE — TELEPHONE ENCOUNTER
---------------------  From: Fátima Christiansen (Appointment Pool ([id number]))   To: NATALIE HASTINGS    Sent: 2019 2:17:50 PM CDT  Subject: RE: Appointment Request     I have you scheduled for 2pm on .      ---------------------  From: NATALIE HASTINGS  To: [facility]  Sent: 2019 02:02 p.m. CDT  Subject: RE: Appointment Request  Alright.  is 2 pm available? if so, 2pm please.  ---------------------  From: Fátima Christiansen (Appointment Pool ([id number]))  To: NATALIE HASTINGS  Sent: 2019 1:58:38 PM CDT  Subject: RE: Appointment Request            Dr. Hill comes in at 2PM.       ---------------------  From: NATALIE HASTINGS  To: [facility]  Sent: 2019 01:53 p.m. CDT  Subject: RE: Appointment Request  1pm please.  ---------------------  From: Fátima Christiansen (Appointment Pool ([id number]))  To: NATALIE HASTINGS  Sent: 2019 1:09:19 PM CDT  Subject: RE: Appointment Request            645PM. Thank you.       ---------------------  From: NATALIE HASTINGS  To: [facility]  Sent: 2019 01:01 p.m. CDT  Subject: RE: Appointment Request  Otf Shine - When is the latest apt he has available that day?  ---------------------  From: Fátima Christiansen (Appointment Pool ([id number]))  To: NATALIE HASTINGS  Sent: 2019 9:26:05 AM CDT  Subject: RE: Appointment Request       Dr. Hill only works afternoons on Thursday s, would that work for you.            ---------------------  From: NATALIE HASTINGS  To: [facility]  Sent: 2019 08:40 a.m. CDT  Subject: Appointment Request  Patient Name: NATALIE VENKATA  Patient : 1980  Preferred Provider: Sergio  Appointment Dates: Between 2019 and 2019  Preferred Days: Any day  Preferred Time: Morning apt. Before 11 am?  Contact Patient by:  Secure Message  Reason for Visit:  annual physical, pap smear, referral for attention assessment

## 2022-02-15 NOTE — TELEPHONE ENCOUNTER
---------------------  From: Lynn Mckeon CMA (eRx Pool ([address]))   To: [facility] Message Pool ([id number]_[place] [facility] [place]);     Sent: 3/16/2021 10:09:37 AM CDT  Subject: FW: Prescription renewal request           ---------------------  From: NATALIE HASTINGS  To: [facility]  Sent: 03/16/2021 10:01 a.m. CDT  Subject: Prescription renewal request  NATALEI HASTINGS is requesting renewal of the prescription(s) below and has submitted the following details:  Prescription(s) to be renewed  Medication: amphetamine-dextroamphetamine 20 mg oral tablet  Dose: 1 tab(s)  Frequency: 2 times a day  Rx date: 02/17/2021  Prescribed by: Torres Hill MD  Reason for renewal:   Quantity:   Delivery option  Send to my pharmacy  [facility] [address]  [place] [id number]  Contact Information  By Secure Message---------------------  From: Cecile Ovalles MA (GTG Message Pool ([id number]_[facility]))   To: Torres Hill MD;     Sent: 3/16/2021 10:13:19 AM CDT  Subject: FW: Prescription renewal request  ** Submitted: **  Order:amphetamine-dextroamphetamine (amphetamine-dextroamphetamine 20 mg oral tablet)  1 tab(s)  Oral  bid  Qty:  60 tab(s)        Refills:  0          Substitutions Allowed     Route To Pharmacy [id number]    Signed by Torres Hill MD  3/16/2021 8:34:00 PM [facility]---------------------  From: Torres Hill MD   To: [facility]TG Message Pool ([id number]_[place] [facility] [place]);     Sent: 3/16/2021 3:35:41 PM CDT  Subject: RE: Prescription renewal request---------------------  From: Cecile Ovalles MA (GTG Message Pool ([address]_[facility]))   To: NATALIE HASTINGS    Sent: 3/17/2021 7:39:21 AM CDT  Subject: FW: Prescription renewal request     Dr. Sergio Stephens receive your message and did send in a refill yesterday for your Adderall.    Sincerely,     Cecile BROWN CMA

## 2022-02-15 NOTE — TELEPHONE ENCOUNTER
Entered by Mark Anthony Mcclure CMA on June 04, 2020 9:03:59 AM CDT  PCP:   [facility]    Medication:   amphetamine-dextroamphetamine  Last Filled:  5-12-20    Quantity:  60  Refills:  0    Date of last office visit and reason:   5-12-20 ADD FU    Date of last Med Check / Px:     Date of last labs pertaining to condition:  no drug screen in EMR    Note:  Please advise on refills.  No CSA in pt EMR.  Mark Anthony Mcclure CMA    Return to Clinic order placed?  no    Resource:   _  Phone:   _  Fax:  _          ---------------------  From: NATALIE HASTINGS  To: [facility]  Sent: 06/04/2020 08:43 a.m. CDT  Subject: Prescription renewal request  NATALIE HASTINGS is requesting renewal of the prescription(s) below and has submitted the following details:  Prescription(s) to be renewed  Medication: amphetamine-dextroamphetamine 20 mg oral tablet  Dose: 1 tab(s)  Frequency: 2 times a day  Rx date: 05/12/2020  Prescribed by: Torers Hill MD  Reason for renewal:   Quantity:   Delivery option  Send to my pharmacy  [facility] [place] [address]  Contact Information  Home Phone: [phone]  Mobile Phone: [phone]  Comments  Medication was refilled 5/6.---------------------  From: Mark Anthony Mcclure CMA (eRx Pool ([address]))   To: Torres Hill MD;     Sent: 6/4/2020 9:04:06 AM CDT  Subject: FW: Prescription renewal request  ** Submitted: **  Order:amphetamine-dextroamphetamine (amphetamine-dextroamphetamine 20 mg oral tablet)  1 tab(s)  Oral  bid  Qty:  60 tab(s)        Refills:  0          Substitutions Allowed     Route To Pharmacy - [facility][id number]    Signed by Torres Hill MD  6/4/2020 3:09:00 PM---------------------  From: Torres Hill MD   To: eRx Pool ([address]_[place]);     Sent: 6/4/2020 10:38:21 AM CDT  Subject: RE: Prescription renewal request

## 2022-02-15 NOTE — TELEPHONE ENCOUNTER
---------------------  From: Hallie Aponte CMA (eRx Pool ([address]))   To: [employer] Message Pool ([address]);     Sent: 1/3/2019 12:56:31 PM CST  Subject: FW: Prescription renewal request       Medication Refill needing approval    PCP:   [facility]    Medication:   Chantix  Last Filled:  9/5/18    Quantity:  60  Refills:  1  CSA on file?   no     Date of last office visit and reason:   5/3/18  Date of last labs pertaining to condition:  no    Return to Clinic order placed?  no?? Please advise.    ---------------------  From: NATALIE HASTINGS  To: [facility]  Sent: 01/03/2019 12:38 p.m. CST  Subject: Prescription renewal request  NATALIE HASTINGS is requesting renewal of the prescription(s) below and has submitted the following details:  Prescription(s) to be renewed  Medication: Chantix 1 mg oral tablet  Dose: 1 tab(s)  Frequency: 2 times a day  Rx date: 09/05/2018  Prescribed by: Torres Hill MD  Reason for renewal: out of medication  Quantity: 30/1 month  Delivery option  Send to my pharmacy  [facility] [place] [address]  Phone: [phone]  Contact Information  By Secure Message---------------------  From: Cecile Ovalles MA ([place] Pool ([address]))   To: Torres Hill MD;     Sent: 1/3/2019 1:24:49 PM CST  Subject: FW: Prescription renewal request  ** Submitted: **  Order:varenicline (Chantix 1 mg oral tablet)  1 tab(s)  Oral  bid  Qty:  60 tab(s)        Refills:  1          Route To Pharmacy - [facility][id number]    Signed by Torres Hill MD  1/3/2019 1:57:00 PM---------------------  From: Torres Hill MD   To: [facility]TG Message Pool ([address]_[place]);     Sent: 1/3/2019 1:57:55 PM CST  Subject: RE: Prescription renewal request---------------------  From: Josr NICHOLAS, Cecile (GTG Message Pool ([address]_[facility]))   To: NATALIE HASTINGS    Sent: 1/3/2019 3:40:04 PM CST  Subject: FW:  Prescription renewal request     Good Afternoon, Dr. Sergio Peñaloza received your message for your Chantix refill.  He did send in refills to [facility].    Sincerely,      Cecile BROWN MA/Dr. Hill

## 2022-02-15 NOTE — PROGRESS NOTES
Chief Complaint    wt loss f/u  History of Present Illness      Patient is here for follow-up on her weight loss management.  She is down about 3 pounds from her last visit.  She is currently taking phentermine 37.5 mg daily without adequate success.  She has no side effects from medication.  She continues to be active and monitor her diet.  Review of Systems      See HPI.  All other review of systems negative.  Physical Exam   Vitals & Measurements    T: 98.5   F (Tympanic)  HR: 100(Peripheral)  BP: 134/88     HT: 67 in  WT: 185.8 lb  BMI: 29.1       Alert, oriented, no acute distress       Normal heart rate       Nonlabored breathing       Cooperative, appropriate affect  Assessment/Plan       Obesity (E66.09)         Continue with current dose of phentermine.  She will check with her insurance regarding coverage for phentermine topiramate and lorcaserin.  Follow-up in 1-2 months       Orders:         phentermine, 1 cap(s) ( 37.5 mg ), PO, Daily, # 30 cap(s), 1 Refill(s), Type: Maintenance, Pharmacy: [facility] #[id number], 1 cap(s) po daily, (Ordered)         phentermine, 1 cap(s) ( 37.5 mg ), PO, Daily, # 30 cap(s), 1 Refill(s), Type: Hard Stop, Pharmacy: [facility] #[id number], (Completed)  Patient Information     Name:NATALIE HASTINGS      Address:      [address] [place] [id number]     Sex:Female     YOB: 1980     [phone]     Emergency Contact:ANU HASTINGS     MRN:[id number]     FIN:[id number]     Location:[facility]     Date of Service:05/03/2018      Primary Care Physician:       Torres Hill MD, [phone]      Attending Physician:       Torres Hill MD, [phone]  Problem List/Past Medical History    Ongoing     Chronic rhinitis     Obesity    Historical     No qualifying data  Medications        Zyrtec 10 mg oral tablet: 10 mg, 1 tab(s), po, daily.        multivitamin with minerals (w/ Iron): 0 Refill(s).        Chantix 1 mg oral  tablet: 1 mg, 1 tab(s), po, bid, 60 tab(s), 2 Refill(s).        fluticasone 50 mcg/inh nasal spray: 2 spray(s), nasal, daily, 1 EA, 11 Refill(s).        azelastine 137 mcg/inh (0.1%) nasal spray: 2 spray(s), nasal, bid, 1 EA, 3 Refill(s).        phentermine 37.5 mg oral capsule: 37.5 mg, 1 cap(s), PO, Daily, 30 cap(s), 1 Refill(s).                Allergies    penicillin (Fever.., flenching)    sulfa drug (Itching)  Social History    Smoking Status - 05/03/2018     Current some day smoker     Alcohol      Current, 3 times per week, 1 drinks/episode average. 2 drinks/episode maximum., 07/17/2017     Tobacco      Current (some day smoker), 01/20/2014  Family History    Anxiety: Sister.    Depression: Mother, Father and Sister.  Immunizations      Vaccine Date Status      tetanus/diphth/pertuss (Tdap) adult/adol 08/08/2011 Recorded      typhoid, inactivated 02/02/2004 Recorded      Hep B 02/02/2004 Recorded      Hep A 02/02/2004 Recorded      Hep B 02/11/2002 Recorded      Hep B 11/16/2001 Recorded      Td 07/20/2000 Recorded      OPV 10/14/1982 Recorded      DTaP 10/14/1982 Recorded      MMR (measles/mumps/rubella) 08/12/1982 Recorded

## 2022-02-15 NOTE — PROGRESS NOTES
Patient:   NATALIE HASTINGS            MRN: [id number]            FIN: [id number]               Age:   36 years     Sex:  Female     :  1980   Associated Diagnoses:   Obesity   Author:   Torres Hill MD      Visit Information      Date of Service: 2017 08:14 am  Performing Location: [facility]  Encounter#: [id number]      Primary Care Provider (PCP):  Torres Hill MD    NPI# [id number]   Visit type:  Increase in symptoms.    Accompanied by:  No one.       Chief Complaint   2017 8:17 AM CDT     here to discuss struggle to lose weight      History of Present Illness   Here for follow up on her weight.  Had gradual gain over the last couple of years.  Phentermine was successful in the past.  Has not seen dietician.      Review of Systems   Constitutional:  No fever, No chills, No sweats, No weakness, No fatigue.    Eye:  No recent visual problem.    Ear/Nose/Mouth/Throat:  No decreased hearing, No nasal congestion, No sore throat.    Respiratory:  No shortness of breath, No cough.    Cardiovascular:  Negative, No chest pain, No palpitations, No peripheral edema.    Gastrointestinal:  No nausea, No vomiting, No diarrhea, No constipation, No heartburn.    Genitourinary:  No dysuria, No change in urine stream.    Hematology/Lymphatics:  No bruising tendency, No bleeding tendency.    Endocrine:  No cold intolerance, No heat intolerance.    Immunologic:  Negative.    Musculoskeletal:  No back pain, No neck pain, No joint pain, No muscle pain.    Integumentary:  No rash, No dryness, No skin lesion.    Neurologic:  Alert and oriented X4, No headache.    Psychiatric:  No anxiety, No depression.       Health Status   Allergies:    Allergic Reactions (Selected)  Severity Not Documented  Penicillin (Fever.. and flenching)  Sulfa drug (Itching)   Medications:  (Selected)   Prescriptions  Prescribed  phentermine 15 mg oral capsule: 1 cap(s) ( 15 mg ), PO, bid, # 60 cap(s), 0 Refill(s),  Type: Maintenance  Documented Medications  Documented  Chantix: po, bid, 0 Refill(s), Type: Maintenance  Zyrtec 10 mg oral tablet: 1 tab(s) ( 10 mg ), po, daily, 0 Refill(s), Type: Maintenance  azelastine-fluticasone 137 mcg-50 mcg/inh nasal spray: 1 spray(s), nasal, bid, 0 Refill(s), Type: Maintenance  fluticasone 50 mcg/inh nasal spray: 1 spray(s), nasal, daily, 0 Refill(s), Type: Maintenance  multivitamin with minerals (w/ Iron): 0 Refill(s), Type: Maintenance   Problem list:    All Problems  Obesity / ICD-9-.00 / Probable      Histories   Past Medical History:    No active or resolved past medical history items have been selected or recorded.   Family History:    Anxiety  Sister  Depression  Mother  Father  Sister     Procedure history:    No active procedure history items have been selected or recorded.   Social History:        Tobacco Assessment            Current (some day smoker)        Physical Examination   Vital Signs   6/6/2017 8:17 AM CDT Temperature Tympanic 98.0 DegF    Peripheral Pulse Rate 84 bpm    Systolic Blood Pressure 111 mmHg    Diastolic Blood Pressure 75 mmHg    Mean Arterial Pressure 87 mmHg      Measurements from flowsheet : Measurements   6/6/2017 8:17 AM CDT Height Measured - Standard 67 in    Weight Measured - Standard 207.8 lb    BSA 2.11 m2    Body Mass Index 32.54 kg/m2      General:  Alert and oriented.    Respiratory:  Lungs are clear to auscultation.    Cardiovascular:  Normal rate.       Impression and Plan   Diagnosis     Obesity (WUS50-KX E66.09).     Course:  Worsening.    Plan:  referral to dietician, consider phentermine if needed.    Orders     Orders   Requests (Consults / Referrals):  Dietary Consult (Request) (Order): Referred to: Grecia Daniel, Reason: weight loss, Obesity.

## 2022-02-15 NOTE — TELEPHONE ENCOUNTER
---------------------  From: Fátima Christiansen (Appointment Pool ([facility]))   To: NATALIE HASTINGS    Sent: 2019 9:26:05 AM CDT  Subject: RE: Appointment Request     Dr. Hill only works afternoons on , would that work for you.      ---------------------  From: NATALIE HASTINGS  To: [facility]  Sent: 2019 08:40 a.m. CDT  Subject: Appointment Request  Patient Name: NATALIE VENKATA  Patient : 1980  Preferred Provider: Sergio  Appointment Dates: Between 2019 and 2019  Preferred Days: Any day  Preferred Time: Morning apt. Before 11 am?  Contact Patient by: Secure Message    Reason for Visit:    annual physical, pap smear, referral for attention assessment

## 2022-02-15 NOTE — NURSING NOTE
Comprehensive Intake Entered On:  5/12/2020 1:53 PM CDT    Performed On:  5/12/2020 1:49 PM CDT by Laurie Rodas LPN               Summary   Chief Complaint :   Verbal consent given for telemed visit. ADD follow up and medication refills. Would also like a refill of Chantix for tobacco use.    Height Measured :   67 in(Converted to: 5 ft 7 in, 170.18 cm)    Laurie Rodas LPN - 5/12/2020 1:49 PM CDT   Health Status   Allergies Verified? :   Yes   Medication History Verified? :   Yes   Tobacco Use? :   Current some day smoker   Tobacco Cessation Review :   Ready to quit   Laurie Rodas LPN - 5/12/2020 1:49 PM CDT   Consents   Consent for Immunization Exchange :   Consent Granted   Consent for Immunizations to Providers :   Consent Granted   Laurie Rodas LPN - 5/12/2020 1:49 PM CDT   Meds / Allergies   (As Of: 5/12/2020 1:53:33 PM CDT)   Allergies (Active)   penicillin  Estimated Onset Date:   Unspecified ; Reactions:   Fever.., flenching ; Created By:   Cecile Mas; Reaction Status:   Active ; Category:   Drug ; Substance:   penicillin ; Type:   Allergy ; Updated By:   Cecile Mas; Reviewed Date:   6/20/2019 2:05 PM CDT      sulfa drug  Estimated Onset Date:   Unspecified ; Reactions:   Itching ; Created By:   Cecile Mas; Reaction Status:   Active ; Category:   Drug ; Substance:   sulfa drug ; Type:   Allergy ; Updated By:   Cecile Mas; Reviewed Date:   6/20/2019 2:05 PM CDT        Medication List   (As Of: 5/12/2020 1:53:33 PM CDT)   Prescription/Discharge Order    amphetamine-dextroamphetamine  :   amphetamine-dextroamphetamine ; Status:   Prescribed ; Ordered As Mnemonic:   amphetamine-dextroamphetamine 20 mg oral tablet ; Simple Display Line:   20 mg, 1 tab(s), Oral, bid, 60 tab(s), 0 Refill(s) ; Ordering Provider:   Torres Hill MD; Catalog Code:   amphetamine-dextroamphetamine ; Order Dt/Tm:   5/6/2020 1:46:28 PM CDT          azelastine nasal  :   azelastine nasal ;  Status:   Prescribed ; Ordered As Mnemonic:   azelastine 137 mcg/inh (0.1%) nasal spray ; Simple Display Line:   2 spray(s), Nasal, bid, 3 EA, 0 Refill(s) ; Ordering Provider:   Torres Hill MD; Catalog Code:   azelastine nasal ; Order Dt/Tm:   12/9/2019 3:49:35 PM CST          fluticasone nasal  :   fluticasone nasal ; Status:   Prescribed ; Ordered As Mnemonic:   fluticasone 50 mcg/inh nasal spray ; Simple Display Line:   2 spray(s), Nasal, daily, 3 EA, 0 Refill(s) ; Ordering Provider:   Torres Hill MD; Catalog Code:   fluticasone nasal ; Order Dt/Tm:   12/9/2019 1:41:02 PM CST          varenicline  :   varenicline ; Status:   Prescribed ; Ordered As Mnemonic:   Chantix 1 mg oral tablet ; Simple Display Line:   1 mg, 1 tab(s), Oral, bid, 60 tab(s), 1 Refill(s) ; Ordering Provider:   Torres Hill MD; Catalog Code:   varenicline ; Order Dt/Tm:   11/26/2019 4:08:06 PM CST          Chantix 1 mg oral tablet  :   Chantix 1 mg oral tablet ; Status:   Prescribed ; Ordered As Mnemonic:   Chantix 1 mg oral tablet ; Simple Display Line:   1 tab(s), Oral, bid, 60 tab(s) ; Ordering Provider:   Torres Hill MD; Catalog Code:   varenicline ; Order Dt/Tm:   7/29/2019 5:03:36 PM CDT          azelastine 137 mcg/inh (0.1%) nasal spray  :   azelastine 137 mcg/inh (0.1%) nasal spray ; Status:   Prescribed ; Ordered As Mnemonic:   azelastine 137 mcg/inh (0.1%) nasal spray ; Simple Display Line:   2 spray(s), Nasal, bid, 30 mL, 1 Refill(s) ; Ordering Provider:   Torres Hill MD; Catalog Code:   azelastine nasal ; Order Dt/Tm:   7/29/2019 5:03:36 PM CDT          Chantix 1 mg oral tablet  :   Chantix 1 mg oral tablet ; Status:   Prescribed ; Ordered As Mnemonic:   Chantix 1 mg oral tablet ; Simple Display Line:   1 tab(s), Oral, bid, 60 tab(s) ; Ordering Provider:   Torres Hill MD; Catalog Code:   varenicline ; Order Dt/Tm:   7/29/2019 5:03:36 PM CDT            Home Meds    multivitamin with minerals   :   multivitamin with minerals ; Status:   Documented ; Ordered As Mnemonic:   multivitamin with minerals (w/ Iron) ; Simple Display Line:   0 Refill(s) ; Catalog Code:   multivitamin with minerals ; Order Dt/Tm:   6/6/2017 8:27:56 AM CDT          cetirizine  :   cetirizine ; Status:   Documented ; Ordered As Mnemonic:   Zyrtec 10 mg oral tablet ; Simple Display Line:   10 mg, 1 tab(s), po, daily ; Catalog Code:   cetirizine ; Order Dt/Tm:   8/6/2013 2:46:04 PM CDT            ID Risk Screen   Recent Travel History :   No recent travel   Family Member Travel History :   No recent travel   Other Exposure to Infectious Disease :   Unknown   Laurie Rodas LPN - 5/12/2020 1:49 PM CDT

## 2022-02-15 NOTE — TELEPHONE ENCOUNTER
---------------------From: Fátima Shelton CMA (eRx Pool (32224_[facility])) To: [facility] Message Pool ([address]224_[facility]);   Sent: 8/9/2019 10:13:47 AM CDTSubject: FW: Prescription renewal request Following a 2 week trial. Separate portal messages with updates---------------------From: NATALIE Smart: [facility] HospitalSent: 08/09/2019 09:39 a.m. CDTSubject: Prescription renewal requestKIKADAWIT GUTHRIESCH is requesting renewal of the prescription(s) below and has submitted the following details:Prescription(s) to be renewedMedication: amphetamine-dextroamphetamine 15 mg oral tabletDose: 1 tab(s)Frequency: 2 times a dayRx date: 07/25/2019Prescribed by: Toby Hill MD for renewal: Quantity: Delivery optionSend to my pharmacyb [address] [place] [address] 14960Dkdvq: [phone] InformationHome Phone: [phone] Phone: [phone]---------------------Ubgl: Cecile Ovalles MA ([facility] [employer] Pool ([address]224_[facility])) To: Torres Hill MD;   Sent: 8/12/2019 7:41:37 AM CDTSubject: FW: Prescription renewal request** Submitted: **Order:amphetamine-dextroamphetamine (amphetamine-dextroamphetamine 15 mg oral tablet)  1 tab(s)  Oral  bidQty:  60 tab(s)        Duration:  30 day(s)        Refills:  0        Substitutions Allowed     Route To Pharmacy - [facility][id number]  Signed by Torres Hill MD  8/12/2019 10:26:00 AM---------------------From: Torres Hill MD To: [employer] Pool ([address]224_[facility]);   Sent: 8/12/2019 10:27:55 AM CDTSubject: RE: Prescription renewal request Actions: Notify the patient with resultsnotified patient that medication was sent to the pharmacy via phone.

## 2022-02-15 NOTE — TELEPHONE ENCOUNTER
---------------------  From: Torres Hill MD   To: NATALIE HASTINGS    Sent: 10/6/2021 12:29:38 PM CDT  Subject: Patient Message      Your medication has been refilled.

## 2022-02-15 NOTE — TELEPHONE ENCOUNTER
"---------------------  From: Maryanne Maguire CMA ([employer] Pool (50335[facility]))   To: [facility] Message Pool ([address] [place] [address]);     Sent: 1/15/2020 7:23:01 PM CST  Subject: Adderall backorder     Fax from [facility] in [facility] stating Adderall 20mg is on back order. They wrote on fax: \"The pt has the 15mg and would like a new rx for 5mg - to equal 20mg\".     Rx for Adderall 20mg filled on 1/9/20 during appt. Please advise.---------------------  From: Cecile Ovalles MA ([employer] Pool ([address]))   To: Torres Hill MD;     Sent: 1/16/2020 12:42:40 PM CST  Subject: FW: Adderall backorder---------------------  From: Torres Hill MD   To: [facility] Message Pool (870[address]);     Sent: 1/16/2020 1:01:49 PM CST  Subject: RE: Adderall backorder     Please find out if she needs a new prescription for the 15 mg dose as well.---------------------  From: Cecile Ovalles MA ([facility] Message Pool ([facility]))   To: Torres Hill MD;     Sent: 1/16/2020 1:33:59 PM CST  Subject: RE: Adderall backorder     Pt just needs 5mg dose as she has 15mg available.  ** Submitted: **  Order:amphetamine-dextroamphetamine (amphetamine-dextroamphetamine 5 mg oral tablet)  1 tab(s)  Oral  bid  Qty:  60 tab(s)        Refills:  0          Substitutions Allowed     Route To Pharmacy - [facility][id number]    Signed by Torres Hill MD  1/16/2020 6:38:00 PM    ** Submitted: **  Suspend:amphetamine-dextroamphetamine (amphetamine-dextroamphetamine 20 mg oral tablet)   Signed by Torres Hill MD  1/16/2020 6:38:00 PM---------------------  From: Torres Hill MD   To: [employer] Pool ([address]_[place]);     Sent: 1/16/2020 6:42:56 PM CST  Subject: RE: Adderall backorder  "

## 2022-02-15 NOTE — TELEPHONE ENCOUNTER
---------------------  From: Fátima Shelton CMA (Phone Messages Pool ([address]_[facility]))   To: [facility] Message Pool ([address]);     Sent: 2/10/2020 2:52:24 PM CST  Subject: FW: Golberisch: Medication check & renewal           ---------------------  From: NATALIE HASTINGS  To: [facility]  Sent: 02/10/2020 02:45 p.m. CST  Subject: Golberisch: Medication check & renewal  Genesis Culver -  The medication change to 20 mg, #60tabs -  is effective and tolerable.  Please refill (rather than the 15 & 5 variation).    Thank you!---------------------  From: Cecile Ovalles MA ([facility] Message Pool ([address]))   To: Torres Hill MD;     Sent: 2/10/2020 3:02:41 PM CST  Subject: FW: Golberisch: Medication check & renewal  ** Submitted: **  Resume:amphetamine-dextroamphetamine (amphetamine-dextroamphetamine 20 mg oral tablet)   Signed by Torres Hill MD  2/10/2020 5:11:00 PM  ** Submitted: **  Order:amphetamine-dextroamphetamine (amphetamine-dextroamphetamine 20 mg oral tablet)  1 tab(s)  Oral  bid  Qty:  60 tab(s)        Refills:  0          Substitutions Allowed     Route To Pharmacy - [facility][id number]    Signed by Torres Hill MD  2/10/2020 5:11:00 PM---------------------  From: Torres Hill MD   To: [facility] Message Pool (186[address]);     Sent: 2/10/2020 5:13:25 PM CST  Subject: RE: Golberisch: Medication check & renewal---------------------  From: Cecile Ovalles MA ([employer] Pool ([address]_[facility]))   To: NATALIE HASTINGS    Sent: 2/10/2020 5:20:23 PM CST  Subject: FW: Abiola: Medication check & renewal     Good Afternoon, Dr. Sergio Peñaloza received your message from earlier today and did send in prescription refill for you.  If you have any further concerns, please let us know.    Sincerely,      Cecile BROWN CMA/Dr. Hill

## 2022-02-15 NOTE — PROGRESS NOTES
Patient:   NATALIE HASTINGS            MRN: [id number]            FIN: [id number]               Age:   38 years     Sex:  Female     :  1980   Associated Diagnoses:   ADD (attention deficit disorder)   Author:   Torres Hill MD      Visit Information      Date of Service: 10/10/2019 06:20 pm  Performing Location: [facility]  Encounter#: [id number]      Primary Care Provider (PCP):  Torres Hill MD    NPI# [id number]   Visit type:  Scheduled follow-up.    History limitation:  None.       Chief Complaint   10/10/2019 6:27 PM CDT   med check--needs Adderall refilled.      History of Present Illness             The patient presents with attention deficit disorder follow up.  There have been no problems with the medication.  There are no other current concerns..        Review of Systems   Constitutional:  Negative.    Eye:  Negative.    Cardiovascular:  No palpitations, No tachycardia.    Gastrointestinal:  Negative.    Musculoskeletal:  Negative.    Neurologic:  Alert and oriented X4, No headache.    Psychiatric:  No anxiety, No depression.       Health Status   Allergies:    Allergic Reactions (Selected)  Severity Not Documented  Penicillin (Fever.. and flenching)  Sulfa drug (Itching)   Problem list:    All Problems  Chronic rhinitis / SNOMED CT 797027029 / Confirmed  Tobacco user / SNOMED CT 297401957 / Probable  Obesity / SNOMED CT 5899066245 / Probable  Resolved: Pregnancy / SNOMED CT 293212647   Medications:  (Selected)   Prescriptions  Prescribed  Chantix 1 mg oral tablet: 1 tab(s), Oral, bid, # 60 tab(s), Type: Soft Stop, Pharmacy: [id number]  Chantix 1 mg oral tablet: 1 tab(s), Oral, bid, # 60 tab(s), Type: Soft Stop, Pharmacy: [facility] #[id number]  amphetamine-dextroamphetamine 15 mg oral tablet: = 1 tab(s) ( 15 mg ), Oral, bid, Instructions: fill on or after 2019, # 60 tab(s), 0 Refill(s), Type: Maintenance, Pharmacy: [facility] #[id number], 1 tab(s) Oral bid,x30  day(s),Instr:fill on or after 9/11/2019  azelastine 137 mcg/inh (0.1%) nasal spray: 2 spray(s), Nasal, bid, # 30 mL, 1 Refill(s), DEVIN, Type: Soft Stop, Pharmacy: [facility] #[id number]  azelastine 137 mcg/inh (0.1%) nasal spray: 2 spray(s), Nasal, bid, # 30 mL, 1 Refill(s), DEVIN, Type: Soft Stop, Pharmacy: [facility] #[id number]  fluticasone 50 mcg/inh nasal spray: = 2 spray(s), Nasal, daily, # 16 gm, 2 Refill(s), DEVIN, Type: Maintenance, Pharmacy: [facility] #[id number]  Documented Medications  Documented  Zyrtec 10 mg oral tablet: 1 tab(s) ( 10 mg ), po, daily, 0 Refill(s), Type: Maintenance  multivitamin with minerals (w/ Iron): 0 Refill(s), Type: Maintenance      Histories   Past Medical History:    Active  Chronic rhinitis (906304927)  Resolved  Pregnancy (755140325): Onset on 12/13/2004 at 23 years.  Resolved on 9/20/2005 at 24 years.   Family History:    Anxiety  Sister  Breast cancer  Aunt (M)  Heart disease  Father  Migraine  Sister  Hypercholesterolemia  Father  Depression  Mother  Father  Sister  Skin cancer  Father     Procedure history:    Tonsillectomy and adenoidectomy (SNOMED CT 636034215).   Social History:        Alcohol Assessment: Current            Beer (12 oz), Wine (5 oz), Liquor (Hard) (1.5 oz), 3-5 times per week, 1 drinks/episode average.  2               drinks/episode maximum.  Ready to change: No.      Tobacco Assessment: Current            Current (some day smoker)            4 or less cigarettes(less than 1/4 pack)/day in last 30 days, Cigarettes, Total pack years: 20.  Ready to               change: Yes.      Substance Abuse Assessment: Past      Employment and Education Assessment            Work/School description: .  Highest education level: 4 yr degree.      Home and Environment Assessment            Marital status: Single.  Living situation: Home/Independent.  Injuries/Abuse/Neglect in household: No.  Feels               unsafe at home: No.  Family/Friends available for  support: Yes.      Nutrition and Health Assessment            Type of diet: Regular.  Wants to lose weight: No.  Sleeping concerns: No.  Feels highly stressed: No.      Exercise and Physical Activity Assessment: Occasional exercise            Exercise frequency: 3-4 times/week.  Exercise type: Aerobics.      Sexual Assessment            Sexually active: No.  Identifies as female, Sexual orientation: Straight or heterosexual.  History of STD:               No.  Contraceptive Use Details: Intrauterine device.  History of sexual abuse: No.      Other Assessment            Regular menses.        Physical Examination   Vital Signs   10/10/2019 6:27 PM CDT Temperature Tympanic 97.9 DegF    Peripheral Pulse Rate 96 bpm    Pulse Site Radial artery    HR Method Manual    Systolic Blood Pressure 128 mmHg    Diastolic Blood Pressure 72 mmHg    Mean Arterial Pressure 91 mmHg    BP Site Left arm    BP Method Manual      Measurements from flowsheet : Measurements   10/10/2019 6:27 PM CDT Height Measured - Standard 67 in    Weight Measured - Standard 186 lb    BSA 2 m2    Body Mass Index 29.13 kg/m2  HI      General:  Alert and oriented.    Eye:  Extraocular movements are intact.    Respiratory:  Lungs are clear to auscultation.    Cardiovascular:  Normal rate, Regular rhythm.    Psychiatric:  Cooperative, Appropriate mood & affect.       Review / Management   Course:  Progressing as expected.       Impression and Plan   Diagnosis     ADD (attention deficit disorder) (MPV40-PX F98.8).     Course:  Improving, Well controlled.    Plan:  continue current medication, WIPDMP queried and no problems identified.   .         Follow-up: In 4 months.    Orders     Orders (Selected)   Prescriptions  Prescribed  Chantix 1 mg oral tablet: See Instructions, Instructions: TAKE ONE TABLET BY MOUTH TWICE DAILY , # 60 tab(s), Type: Soft Stop, Pharmacy: [facility][id number], TAKE ONE TABLET BY MOUTH TWICE DAILY  amphetamine-dextroamphetamine 15 mg  oral tablet: = 1 tab(s) ( 15 mg ), Oral, bid, # 60 tab(s), 0 Refill(s), Type: Maintenance, Pharmacy: [facility] #[id number], 1 tab(s) Oral bid,x30 day(s).     Counseled:  Patient, Regarding medications.    Patient Instructions:  Launch follow-up (if licensed).       Professional Services   Counseling Summary:  The total time spent counseling the patient was 15 minutes.

## 2022-02-15 NOTE — PROGRESS NOTES
"   Patient:   NATALIE HASTINGS            MRN: [id number]            FIN: [id number]               Age:   37 years     Sex:  Female     :  1980   Associated Diagnoses:   Obesity   Author:   Lucia Daniel      Visit Information   Visit type:  Medical Nutrition Therapy.    Referral source:  Torres Hill MD.       Chief Complaint   Overweight (BMI 28)      Interval History   Pt is here today for follow up weight loss education.  Pt has lost 27.4# with gradual progress since initial RD visit on 17 starting weight of 208#.    Currently on Qsymia and had been on Phentermine   Pt is satified with the amount of weight loss and will likely stop Qsymia due to cost and feeling good about her weight loss and dietary challenges.    Nutrition: Continues to improve nutritional quality of intake.  Pt's son is back in school and therefore changing routines - looking at healthy quick meals is important.  Rarely eating out. Eating fruit daily and vegetables 2x/ day.  Trying to prep meals in advance and take leftovers for work.  Using myfitnesspal to record intake.  Looking at labels and trying to stay within the portion recommended more mindful of hunger and fullness cues.      Exercise: runnign 2x/ week, walking the dog daily   sleep: good   Stress: moderate, work, son, \"life\"       Health Status   Allergies:    Allergic Reactions (Selected)  Severity Not Documented  Penicillin (Fever.. and flenching)  Sulfa drug (Itching)   Medications:  (Selected)   Prescriptions  Prescribed  Chantix 1 mg oral tablet: = 1 tab(s) ( 1 mg ), Oral, bid, # 60 tab(s), 1 Refill(s), Type: Soft Stop, Pharmacy: [facility] #[id number], 1 tab(s) Oral bid  Qsymia 3.75 mg-23 mg oral capsule, extended release: 1 cap(s), po, qam, Instructions: 1 qd for 14 days  then increase to 2 qd, # 60 cap(s), 0 Refill(s), Type: Maintenance, Pharmacy: [facility] #[id number], 1 cap(s) po qam,Instr:1 qd for 14 days  then increase to 2 qd  Qsymia 7.5 mg-46 mg oral capsule, " extended release: 1 cap(s), Oral, qam, # 30 cap(s), 0 Refill(s), Type: Maintenance, Pharmacy: [facility] #[id number], This is a change in dose, 1 cap(s) Oral qam  azelastine 137 mcg/inh (0.1%) nasal spray: 2 spray(s), nasal, bid, # 1 EA, 3 Refill(s), Type: Maintenance, Pharmacy: [facility] #[id number], 2 spray(s) nasal bid  fluticasone 50 mcg/inh nasal spray: 2 spray(s), nasal, daily, # 1 EA, 11 Refill(s), Type: Maintenance, Pharmacy: [facility] #[id number], 2 spray(s) nasal daily  Documented Medications  Documented  Zyrtec 10 mg oral tablet: 1 tab(s) ( 10 mg ), po, daily, 0 Refill(s), Type: Maintenance  multivitamin with minerals (w/ Iron): 0 Refill(s), Type: Maintenance   Problem list:    All Problems  Obesity / SNOMED CT 6936851784 / Probable  Chronic rhinitis / SNOMED CT 684126996 / Confirmed      Histories   Past Medical History:    No active or resolved past medical history items have been selected or recorded.   Family History:    Anxiety  Sister  Depression  Mother  Father  Sister     Procedure history:    No active procedure history items have been selected or recorded.   Social History:        Alcohol Assessment            Current, 3 times per week, 1 drinks/episode average.  2 drinks/episode maximum.      Tobacco Assessment            Current (some day smoker)        Physical Examination   Measurements from flowsheet : Measurements   9/20/2018 3:24 PM CDT Height Measured - Standard 67 in    Weight Measured - Standard 180.6 lb    BSA 1.97 m2    Body Mass Index 28.28 kg/m2  HI         Impression and Plan   Diagnosis     Obesity (AVC62-YQ E66.09).       Ongoing motivational counseling for weight loss.    Pt likely finishing out rx for Qsymia and stopping nutrition counseling.  Pt feel that she has definely made lifestyle changes that she can and will continue.  Focused on mindful eating and having right foods in the house so they are easily accessable    Use vegetable blend seasoning - no salt seasoning   Reviewed  hunger/ fullness cues - doing other tasks besides eating.  Continue with mindful eating and continue keeping track of intake on an jo to become more aware of balance at meals and overall calorie intake.  When time allows use sparkpeople where menus, recipes and grocery shopping lists provided.  Provided additional meal and snack ideas.    Education of the following topis:  - Myplate, ~1250 calorie meal plan, portion sizes, label reading, bring quart size ziplock back with veggies 3x/ week, water 8-9 cups/ day!!! metamucil BID - will need to start this again, MVI with 100% Vit D  - weight loss tips, mindful eating, motivational tips with reward system  - recording intake ([employer] pal)  - exercise recommendations FÉLIX - pt may like [employer] free videos, spark people videos   - ~70 gm protein/ day  - increase egg whites, legumes, salmon, tuna  - fruit 2+ x/ day  - 3 food groups/ meal   - wear tennis shoes for walking  - try meal replacement drink  - more WATER - bring drinking cup to work     Goals:   1.  Practice healthy stress management and get good quality sleep with the goal of 7-8 hours per night.    2.  Increase physical activity and make this a part of a daily routine.  Recommend 30 minutes of moderately intense physical activity 5 or more days per week.  Stay physically active on a daily basis and throughout the year.  Recommend a pedometer; gradually increase the average to a minimum of 6000 steps with the ultimate goal of 10,000 steps per day.    3.  Eat in a healthy way, per food plate method .  Keep a food record.  Eat 3 meals/ day.  A meal is three or more food groups; make it colorful for better nutrition.  Focus on portion control.  ~1250 calorie meal plan.  Follow weight loss tips and mindful eating.    4.  Goal weight 177 by 10/2018  5.  Read handouts provided.        Professional Services   Time spent with pt 30 min   cc Dr. Hill

## 2022-02-15 NOTE — NURSING NOTE
Comprehensive Intake Entered On:  9/15/2020 11:14 AM CDT    Performed On:  9/15/2020 11:09 AM CDT by Cecile Ovalles MA               Summary   Chief Complaint :   annual px, fill out wellness form for work.   Last Menstrual Period :   8/19/2020 CDT   Weight Measured :   185.6 lb(Converted to: 185 lb 10 oz, 84.19 kg)    Height Measured :   67 in(Converted to: 5 ft 7 in, 170.18 cm)    Body Mass Index :   29.07 kg/m2 (HI)    Body Surface Area :   1.99 m2   Systolic Blood Pressure :   116 mmHg   Diastolic Blood Pressure :   72 mmHg   Mean Arterial Pressure :   87 mmHg   Peripheral Pulse Rate :   94 bpm   BP Site :   Right arm   Pulse Site :   Radial artery   BP Method :   Manual   HR Method :   Manual   Temperature Tympanic :   99.1 DegF(Converted to: 37.3 DegC)    Oxygen Saturation :   98 %   Cecile Ovalles MA - 9/15/2020 11:09 AM CDT   Health Status   Allergies Verified? :   Yes   Medication History Verified? :   Yes   Medical History Verified? :   Yes   Pre-Visit Planning Status :   Completed   Tobacco Use? :   Current every day smoker   Cecile Ovalles MA - 9/15/2020 11:09 AM CDT   Consents   Consent for Immunization Exchange :   Consent Granted   Consent for Immunizations to Providers :   Consent Granted   Cecile Ovalles MA - 9/15/2020 11:09 AM CDT   Meds / Allergies   (As Of: 9/15/2020 11:14:43 AM CDT)   Allergies (Active)   penicillin  Estimated Onset Date:   Unspecified ; Reactions:   Fever.., flenching ; Created By:   Cecile Mas; Reaction Status:   Active ; Category:   Drug ; Substance:   penicillin ; Type:   Allergy ; Updated By:   Cecile Mas; Reviewed Date:   6/20/2019 2:05 PM CDT      sulfa drug  Estimated Onset Date:   Unspecified ; Reactions:   Itching ; Created By:   Cecile Mas; Reaction Status:   Active ; Category:   Drug ; Substance:   sulfa drug ; Type:   Allergy ; Updated By:   Cecile Mas; Reviewed Date:   6/20/2019 2:05 PM CDT        Medication List   (As Of: 9/15/2020 11:14:43 AM  CDT)   Prescription/Discharge Order    amphetamine-dextroamphetamine  :   amphetamine-dextroamphetamine ; Status:   Prescribed ; Ordered As Mnemonic:   amphetamine-dextroamphetamine 20 mg oral tablet ; Simple Display Line:   20 mg, 1 tab(s), Oral, bid, 60 tab(s), 0 Refill(s) ; Ordering Provider:   Mark Lopes PA-C; Catalog Code:   amphetamine-dextroamphetamine ; Order Dt/Tm:   9/1/2020 3:03:42 PM CDT          azelastine nasal  :   azelastine nasal ; Status:   Prescribed ; Ordered As Mnemonic:   azelastine 137 mcg/inh (0.1%) nasal spray ; Simple Display Line:   2 spray(s), Nasal, bid, 1 EA, 0 Refill(s) ; Ordering Provider:   Torres Hill MD; Catalog Code:   azelastine nasal ; Order Dt/Tm:   9/1/2020 2:34:48 PM CDT          azelastine 137 mcg/inh (0.1%) nasal spray  :   azelastine 137 mcg/inh (0.1%) nasal spray ; Status:   Prescribed ; Ordered As Mnemonic:   azelastine 137 mcg/inh (0.1%) nasal spray ; Simple Display Line:   2 spray(s), Nasal, bid, 30 mL, 1 Refill(s) ; Ordering Provider:   Torres Hill MD; Catalog Code:   azelastine nasal ; Order Dt/Tm:   7/29/2019 5:03:36 PM CDT          fluticasone nasal  :   fluticasone nasal ; Status:   Prescribed ; Ordered As Mnemonic:   fluticasone 50 mcg/inh nasal spray ; Simple Display Line:   2 spray(s), Nasal, daily, 1 EA, 0 Refill(s) ; Ordering Provider:   Torres Hill MD; Catalog Code:   fluticasone nasal ; Order Dt/Tm:   9/1/2020 2:34:58 PM CDT          varenicline  :   varenicline ; Status:   Prescribed ; Ordered As Mnemonic:   Chantix 1 mg oral tablet ; Simple Display Line:   1 mg, 1 tab(s), Oral, bid, 60 tab(s), 1 Refill(s) ; Ordering Provider:   Torres Hill MD; Catalog Code:   varenicline ; Order Dt/Tm:   8/3/2020 1:40:22 PM CDT            Home Meds    cetirizine  :   cetirizine ; Status:   Documented ; Ordered As Mnemonic:   Zyrtec 10 mg oral tablet ; Simple Display Line:   10 mg, 1 tab(s), po, daily ; Catalog Code:   cetirizine ; Order  Dt/Tm:   8/6/2013 2:46:04 PM CDT          multivitamin with minerals  :   multivitamin with minerals ; Status:   Documented ; Ordered As Mnemonic:   multivitamin with minerals (w/ Iron) ; Simple Display Line:   0 Refill(s) ; Catalog Code:   multivitamin with minerals ; Order Dt/Tm:   6/6/2017 8:27:56 AM CDT            ID Risk Screen   Recent Travel History :   No recent travel   Family Member Travel History :   No recent travel   Other Exposure to Infectious Disease :   Unknown   Cecile Ovalles MA - 9/15/2020 11:09 AM CDT   Social History   Social History   (As Of: 9/15/2020 11:14:43 AM CDT)   Alcohol:  Current      Beer (12 oz), Wine (5 oz), Liquor (Hard) (1.5 oz), 3-5 times per week, 1 drinks/episode average.  2 drinks/episode maximum.  Ready to change: No.   (Last Updated: 7/8/2019 3:01:09 PM CDT by Jenny Alfred)          Tobacco:  Current      Current (some day smoker)   (Last Updated: 1/20/2014 11:58:36 AM CST by Cecile Ovalles MA)   4 or less cigarettes(less than 1/4 pack)/day in last 30 days, Cigarettes, Total pack years: 20.  Ready to change: Yes.   (Last Updated: 7/8/2019 2:54:24 PM CDT by Jenny Alfred)          Substance Abuse:  Past      (Last Updated: 7/8/2019 2:54:30 PM CDT by Jenny Alfred )         Employment/School:        Work/School description: .  Highest education level: 4 yr degree.   (Last Updated: 7/8/2019 3:02:16 PM CDT by Jenny Alfred)          Home/Environment:        Marital status: Single.  Living situation: Home/Independent.  Injuries/Abuse/Neglect in household: No.  Feels unsafe at home: No.  Family/Friends available for support: Yes.   (Last Updated: 7/8/2019 2:55:13 PM CDT by Jenny Alfred)          Nutrition/Health:        Type of diet: Regular.  Wants to lose weight: No.  Sleeping concerns: No.  Feels highly stressed: No.   (Last Updated: 7/8/2019 3:01:35 PM CDT by Jenny Alfred)          Exercise:  Occasional exercise      Exercise frequency: 3-4  times/week.  Exercise type: Aerobics.   (Last Updated: 7/8/2019 3:01:48 PM CDT by Jenny Alfred)          Sexual:        Sexually active: No.  Identifies as female, Sexual orientation: Straight or heterosexual.  History of STD: No.  Contraceptive Use Details: Intrauterine device.  History of sexual abuse: No.   (Last Updated: 7/8/2019 3:02:58 PM CDT by Jenny Alfred)          Other:        Regular menses.   (Last Updated: 7/8/2019 3:07:41 PM CDT by Jenny Alfred)

## 2022-02-15 NOTE — TELEPHONE ENCOUNTER
---------------------  From: Crystal Oquendo CMA   To: NATALIE HASTINGS    Sent: 11/24/2020 2:06:28 PM CST  Subject: Med refills     Otf Peñaloza,    The medicines you requested have been refilled. It looks like the Chantix was filled in October with 1 refill so I would contact the pharmacy to see if they can fill that one for you.    Take care,    Have a nice Thanksgiving!    Crystal

## 2022-02-15 NOTE — PROGRESS NOTES
Patient Information     Name:NATALIE HASTINGS      Address:      [address] [place] [id number]     Sex:Female     YOB: 1980     [phone]     Emergency Contact:ANU HASTINGS     MRN:[id number]     FIN:[id number]     Location:[facility]     Date of Service:01/18/2018      Primary Care Physician:       Torres Hill MD, [phone]  Subjective      Here for follow up on weight loss management.  She is dong better with the increase phentermine dose.  Reports occasional palpitations with increased dose.  She continues with regular activity.   Review of Systems           ROS reviewed an negative except for symptoms noted in HPI.             Objective   Vitals & Measurements    T: 97.9(Tympanic)  HR: 92(Peripheral)  BP: 100/76  WT: 192.2 lb    Physical Exam           General:  Alert and oriented, No acute distress.            Respiratory:  Lungs are clear to auscultation, Respirations are non-labored.             Cardiovascular:  Normal rate, Regular rhythm.             Psychiatric:  Cooperative, Appropriate mood & affect.   Assessment/Plan       Obesity         continue current medication, follow up in one month         Ordered:          [id number] office outpatient visit 15 minutes (Charge), Quantity: 1, Obesity                Orders:         phentermine, 1 cap(s) ( 15 mg ), PO, bid, # 60 cap(s), 0 Refill(s), Type: Maintenance, Pharmacy: [id number], 1 cap(s) po bid,x30 day(s)

## 2022-02-15 NOTE — NURSING NOTE
Comprehensive Intake Entered On:  7/7/2021 10:20 AM CDT    Performed On:  7/7/2021 10:16 AM CDT by Cecile Ovalles MA               Summary   Chief Complaint :   med check.  also discuss Chantix and C19 vaccination.   Weight Measured :   173 lb(Converted to: 173 lb 0 oz, 78.471 kg)    Height Measured :   67 in(Converted to: 5 ft 7 in, 170.18 cm)    Body Mass Index :   27.09 kg/m2 (HI)    Body Surface Area :   1.92 m2   Systolic Blood Pressure :   121 mmHg   Diastolic Blood Pressure :   76 mmHg   Mean Arterial Pressure :   91 mmHg   Peripheral Pulse Rate :   92 bpm   BP Site :   Right arm   Pulse Site :   Radial artery   BP Method :   Electronic   HR Method :   Electronic   Temperature Tympanic :   98.2 DegF(Converted to: 36.8 DegC)    Oxygen Saturation :   99 %   Cecile Ovalles MA - 7/7/2021 10:16 AM CDT   Health Status   Allergies Verified? :   Yes   Medication History Verified? :   Yes   Medical History Verified? :   Yes   Pre-Visit Planning Status :   Completed   Tobacco Use? :   Current every day smoker   Cecile Ovalles MA - 7/7/2021 10:16 AM CDT   Consents   Consent for Immunization Exchange :   Consent Granted   Consent for Immunizations to Providers :   Consent Granted   Cecile Ovalles MA - 7/7/2021 10:16 AM CDT   Meds / Allergies   (As Of: 7/7/2021 10:20:56 AM CDT)   Allergies (Active)   penicillin  Estimated Onset Date:   Unspecified ; Reactions:   Fever.., flenching ; Created By:   Cecile Mas; Reaction Status:   Active ; Category:   Drug ; Substance:   penicillin ; Type:   Allergy ; Updated By:   Cecile Mas; Reviewed Date:   6/20/2019 2:05 PM CDT      sulfa drug  Estimated Onset Date:   Unspecified ; Reactions:   Itching ; Created By:   Cecile Mas; Reaction Status:   Active ; Category:   Drug ; Substance:   sulfa drug ; Type:   Allergy ; Updated By:   Cecile Mas; Reviewed Date:   6/20/2019 2:05 PM CDT        Medication List   (As Of: 7/7/2021 10:20:56 AM CDT)   Prescription/Discharge  Order    amphetamine-dextroamphetamine  :   amphetamine-dextroamphetamine ; Status:   Prescribed ; Ordered As Mnemonic:   amphetamine-dextroamphetamine 20 mg oral tablet ; Simple Display Line:   20 mg, 1 tab(s), Oral, bid, 60 tab(s), 0 Refill(s) ; Ordering Provider:   Torres Hill MD; Catalog Code:   amphetamine-dextroamphetamine ; Order Dt/Tm:   6/15/2021 12:38:49 PM CDT          azelastine nasal  :   azelastine nasal ; Status:   Prescribed ; Ordered As Mnemonic:   azelastine 137 mcg/inh (0.1%) nasal spray ; Simple Display Line:   2 spray(s), Nasal, bid, 3 EA, 1 Refill(s) ; Ordering Provider:   Torres Hill MD; Catalog Code:   azelastine nasal ; Order Dt/Tm:   4/15/2021 11:17:23 AM CDT          azelastine 137 mcg/inh (0.1%) nasal spray  :   azelastine 137 mcg/inh (0.1%) nasal spray ; Status:   Prescribed ; Ordered As Mnemonic:   azelastine 137 mcg/inh (0.1%) nasal spray ; Simple Display Line:   2 spray(s), Nasal, bid, 30 mL, 1 Refill(s) ; Ordering Provider:   Torres Hill MD; Catalog Code:   azelastine nasal ; Order Dt/Tm:   7/29/2019 5:03:36 PM CDT          fluticasone nasal  :   fluticasone nasal ; Status:   Prescribed ; Ordered As Mnemonic:   fluticasone 50 mcg/inh nasal spray ; Simple Display Line:   2 spray(s), Nasal, daily, 3 EA, 1 Refill(s) ; Ordering Provider:   Torres Hill MD; Catalog Code:   fluticasone nasal ; Order Dt/Tm:   4/15/2021 11:17:24 AM CDT            Home Meds    cetirizine  :   cetirizine ; Status:   Documented ; Ordered As Mnemonic:   Zyrtec 10 mg oral tablet ; Simple Display Line:   10 mg, 1 tab(s), po, daily ; Catalog Code:   cetirizine ; Order Dt/Tm:   8/6/2013 2:46:04 PM CDT          multivitamin with minerals  :   multivitamin with minerals ; Status:   Documented ; Ordered As Mnemonic:   multivitamin with minerals (w/ Iron) ; Simple Display Line:   0 Refill(s) ; Catalog Code:   multivitamin with minerals ; Order Dt/Tm:   6/6/2017 8:27:56 AM CDT            Social  History   Social History   (As Of: 7/7/2021 10:20:56 AM CDT)   Alcohol:  Current      Beer (12 oz), Wine (5 oz), Liquor (Hard) (1.5 oz), 3-5 times per week, 1 drinks/episode average.  2 drinks/episode maximum.  Ready to change: No.   (Last Updated: 7/8/2019 3:01:09 PM CDT by Jenny Alfred)          Tobacco:  Current      4 or less cigarettes(less than 1/4 pack)/day in last 30 days, Cigarettes, Total pack years: 20.  Ready to change: Yes.   (Last Updated: 2/17/2021 9:00:29 AM CST by Cecile Ovalles MA)          Electronic Cigarette/Vaping:        Electronic Cigarette Use: Never.   (Last Updated: 2/16/2021 4:21:48 PM CST by Cecile Ovalles MA)          Substance Abuse:  Past      (Last Updated: 7/8/2019 2:54:30 PM CDT by Jenny Alfred )         Employment/School:        Work/School description: .  Highest education level: 4 yr degree.   (Last Updated: 7/8/2019 3:02:16 PM CDT by Jenny Alfred)          Home/Environment:        Marital status: Single.  Living situation: Home/Independent.  Injuries/Abuse/Neglect in household: No.  Feels unsafe at home: No.  Family/Friends available for support: Yes.   (Last Updated: 7/8/2019 2:55:13 PM CDT by Jenny Alfred)   Marital status: Single.  Lives with Children, Roomate(s)/Friend(s).   (Last Updated: 2/16/2021 4:21:48 PM CST by Cecile Ovalles MA)          Nutrition/Health:        Type of diet: Regular.  Wants to lose weight: No.  Sleeping concerns: No.  Feels highly stressed: No.   (Last Updated: 7/8/2019 3:01:35 PM CDT by Jenny Alfred)          Exercise:  Occasional exercise      Exercise frequency: 3-4 times/week.  Exercise type: Aerobics.   (Last Updated: 7/8/2019 3:01:48 PM CDT by Jenny Alfred)          Sexual:        Sexually active: No.  Identifies as female, Sexual orientation: Straight or heterosexual.  History of STD: No.  Contraceptive Use Details: Intrauterine device.  History of sexual abuse: No.   (Last Updated: 7/8/2019 3:02:58 PM CDT by  Jenny Alfred)          Other:        Regular menses.   (Last Updated: 7/8/2019 3:07:41 PM CDT by Jenny Alfred)

## 2022-02-15 NOTE — TELEPHONE ENCOUNTER
---------------------  From: Cecile Ovalles MA (eRx Pool ([id number]_[facility]))   To: Torres Hill MD;     Sent: 4/7/2020 4:13:31 PM CDT  Subject: FW: Prescription renewal request     LV:  1/9/2020 for ADHD f/u, RTC due May 2020.  Please advise.      ---------------------  From: ANABELA HASTINGS  To: [facility]  Sent: 04/06/2020 05:09 p.m. CDT  Subject: Prescription renewal request  ANABELA HASTINGS is requesting renewal of the prescription(s) below and has submitted the following details:  Prescription(s) to be renewed  Medication: amphetamine-dextroamphetamine 20 mg oral tablet  Dose: 1 tab(s)  Frequency: 2 times a day  Rx date: 03/10/2020  Prescribed by: Torres Hill MD  Reason for renewal:   Quantity:   Delivery option  Send to my pharmacy  [facility]  [facility] [place] [address]  Contact Information  By Secure Message  ** Submitted: **  Order:amphetamine-dextroamphetamine (amphetamine-dextroamphetamine 20 mg oral tablet)  1 tab(s)  Oral  bid  Qty:  60 tab(s)        Refills:  0          Substitutions Allowed     Route To Pharmacy - [facility] #[id number]    Signed by Torres Hill MD  4/7/2020 9:35:00 PM---------------------  From: Torres Hill MD   To: eRx Pool ([id number] [facility] [place]);     Sent: 4/7/2020 4:36:48 PM CDT  Subject: RE: Prescription renewal request---------------------  From: Cecile Ovalles MA (eRx Pool ([id number]))   To: ANABELA HASTINGS    Sent: 4/7/2020 4:52:30 PM CDT  Subject: FW: Prescription renewal request     Anabela Morgan Dr. Goblirsch sent in a refill of your Adderall to [facility] pharmacy as directed.  You may schedule a telemed visit with Dr. Hill for next month when you are due for your ADHD follow up.    Sincerely,      Cecile BROWN CMA/Dr. Hill

## 2022-02-15 NOTE — NURSING NOTE
Comprehensive Intake Entered On:  10/10/2019 6:32 PM CDT    Performed On:  10/10/2019 6:27 PM CDT by Josr NICHOLAS, Cecile               Summary   Chief Complaint :   med check--needs Adderall refilled.   Weight Measured :   186 lb(Converted to: 186 lb 0 oz, 84.37 kg)    Height Measured :   67 in(Converted to: 5 ft 7 in, 170.18 cm)    Body Mass Index :   29.13 kg/m2 (HI)    Body Surface Area :   2 m2   Systolic Blood Pressure :   128 mmHg   Diastolic Blood Pressure :   72 mmHg   Mean Arterial Pressure :   91 mmHg   Peripheral Pulse Rate :   96 bpm   BP Site :   Left arm   Pulse Site :   Radial artery   BP Method :   Manual   HR Method :   Manual   Temperature Tympanic :   97.9 DegF(Converted to: 36.6 DegC)    Cecile Ovalles MA - 10/10/2019 6:27 PM CDT   Health Status   Allergies Verified? :   Yes   Medication History Verified? :   Yes   Medical History Verified? :   Yes   Pre-Visit Planning Status :   Completed   Cecile Ovalles MA - 10/10/2019 6:27 PM CDT   Consents   Consent for Immunization Exchange :   Consent Granted   Consent for Immunizations to Providers :   Consent Granted   Cecile Ovalles MA - 10/10/2019 6:27 PM CDT   Problems   (As Of: 10/10/2019 6:32:17 PM CDT)   Problems(Active)    Chronic rhinitis (SNOMED CT  :326602447 )  Name of Problem:   Chronic rhinitis ; Recorder:   Torres Hill MD; Confirmation:   Confirmed ; Classification:   Medical ; Code:   039907906 ; Contributor System:   [employer] ; Last Updated:   7/8/2019 2:49 PM CDT ; Life Cycle Status:   Active ; Responsible Provider:   Torres Hill MD; Vocabulary:   SNOMED CT        Obesity (SNOMED CT  :2749208480 )  Name of Problem:   Obesity ; Recorder:   SYSTEM; Confirmation:   Probable ; Classification:   Medical ; Code:   3090128325 ; Last Updated:   6/6/2017 8:40 AM CDT ; Life Cycle Date:   8/6/2013 ; Life Cycle Status:   Active ; Vocabulary:   SNOMED CT        Tobacco user (SNOMED CT  :183537323 )  Name of Problem:   Tobacco user ;  Recorder:   SYSTEM; Confirmation:   Probable ; Classification:   Medical ; Code:   252602372 ; Last Updated:   7/8/2019 2:54 PM CDT ; Life Cycle Date:   7/8/2019 ; Life Cycle Status:   Active ; Vocabulary:   SNOMED CT          Meds / Allergies   (As Of: 10/10/2019 6:32:17 PM CDT)   Allergies (Active)   penicillin  Estimated Onset Date:   Unspecified ; Reactions:   Fever.., flenching ; Created By:   Cecile Mas; Reaction Status:   Active ; Category:   Drug ; Substance:   penicillin ; Type:   Allergy ; Updated By:   Cecile Mas; Reviewed Date:   6/20/2019 2:05 PM CDT      sulfa drug  Estimated Onset Date:   Unspecified ; Reactions:   Itching ; Created By:   Cecile Mas; Reaction Status:   Active ; Category:   Drug ; Substance:   sulfa drug ; Type:   Allergy ; Updated By:   Cecile Mas; Reviewed Date:   6/20/2019 2:05 PM CDT        Medication List   (As Of: 10/10/2019 6:32:17 PM CDT)   Prescription/Discharge Order    amphetamine-dextroamphetamine  :   amphetamine-dextroamphetamine ; Status:   Prescribed ; Ordered As Mnemonic:   amphetamine-dextroamphetamine 15 mg oral tablet ; Simple Display Line:   15 mg, 1 tab(s), Oral, bid, for 30 day(s), fill on or after 9/11/2019, 60 tab(s), 0 Refill(s) ; Ordering Provider:   Mark Lopes PA-C; Catalog Code:   amphetamine-dextroamphetamine ; Order Dt/Tm:   9/11/2019 1:04:21 PM CDT          azelastine 137 mcg/inh (0.1%) nasal spray  :   azelastine 137 mcg/inh (0.1%) nasal spray ; Status:   Prescribed ; Ordered As Mnemonic:   azelastine 137 mcg/inh (0.1%) nasal spray ; Simple Display Line:   2 spray(s), Nasal, bid, 30 mL, 1 Refill(s) ; Ordering Provider:   Torres Hill MD; Catalog Code:   azelastine nasal ; Order Dt/Tm:   7/29/2019 5:03:36 PM CDT          azelastine 137 mcg/inh (0.1%) nasal spray  :   azelastine 137 mcg/inh (0.1%) nasal spray ; Status:   Prescribed ; Ordered As Mnemonic:   azelastine 137 mcg/inh (0.1%) nasal spray ; Simple Display Line:   2  spray(s), Nasal, bid, 30 mL, 1 Refill(s) ; Ordering Provider:   Torres Hill MD; Catalog Code:   azelastine nasal ; Order Dt/Tm:   7/29/2019 5:03:36 PM CDT          fluticasone nasal  :   fluticasone nasal ; Status:   Prescribed ; Ordered As Mnemonic:   fluticasone 50 mcg/inh nasal spray ; Simple Display Line:   2 spray(s), Nasal, daily, 16 gm, 2 Refill(s) ; Ordering Provider:   Torres Hill MD; Catalog Code:   fluticasone nasal ; Order Dt/Tm:   9/19/2019 4:56:24 PM CDT          Chantix 1 mg oral tablet  :   Chantix 1 mg oral tablet ; Status:   Prescribed ; Ordered As Mnemonic:   Chantix 1 mg oral tablet ; Simple Display Line:   1 tab(s), Oral, bid, 60 tab(s) ; Ordering Provider:   Torres Hill MD; Catalog Code:   varenicline ; Order Dt/Tm:   7/29/2019 5:03:36 PM CDT          Chantix 1 mg oral tablet  :   Chantix 1 mg oral tablet ; Status:   Prescribed ; Ordered As Mnemonic:   Chantix 1 mg oral tablet ; Simple Display Line:   1 tab(s), Oral, bid, 60 tab(s) ; Ordering Provider:   Torres Hill MD; Catalog Code:   varenicline ; Order Dt/Tm:   7/29/2019 5:03:36 PM CDT            Home Meds    cetirizine  :   cetirizine ; Status:   Documented ; Ordered As Mnemonic:   Zyrtec 10 mg oral tablet ; Simple Display Line:   10 mg, 1 tab(s), po, daily ; Catalog Code:   cetirizine ; Order Dt/Tm:   8/6/2013 2:46:04 PM CDT          multivitamin with minerals  :   multivitamin with minerals ; Status:   Documented ; Ordered As Mnemonic:   multivitamin with minerals (w/ Iron) ; Simple Display Line:   0 Refill(s) ; Catalog Code:   multivitamin with minerals ; Order Dt/Tm:   6/6/2017 8:27:56 AM CDT            Social History   Social History   (As Of: 10/10/2019 6:32:17 PM CDT)   Alcohol:  Current      Beer (12 oz), Wine (5 oz), Liquor (Hard) (1.5 oz), 3-5 times per week, 1 drinks/episode average.  2 drinks/episode maximum.  Ready to change: No.   (Last Updated: 7/8/2019 3:01:09 PM CDT by Jenny Alfred)           Tobacco:  Current      Current (some day smoker)   (Last Updated: 1/20/2014 11:58:36 AM CST by Cecile Ovalles MA)   4 or less cigarettes(less than 1/4 pack)/day in last 30 days, Cigarettes, Total pack years: 20.  Ready to change: Yes.   (Last Updated: 7/8/2019 2:54:24 PM CDT by Jenny Alfred)          Substance Abuse:  Past      (Last Updated: 7/8/2019 2:54:30 PM CDT by Jenny Alfred )         Employment/School:        Work/School description: .  Highest education level: 4 yr degree.   (Last Updated: 7/8/2019 3:02:16 PM CDT by Jenny Alfred)          Home/Environment:        Marital status: Single.  Living situation: Home/Independent.  Injuries/Abuse/Neglect in household: No.  Feels unsafe at home: No.  Family/Friends available for support: Yes.   (Last Updated: 7/8/2019 2:55:13 PM CDT by Jenny Alfred)          Nutrition/Health:        Type of diet: Regular.  Wants to lose weight: No.  Sleeping concerns: No.  Feels highly stressed: No.   (Last Updated: 7/8/2019 3:01:35 PM CDT by Jenny Alfred)          Exercise:  Occasional exercise      Exercise frequency: 3-4 times/week.  Exercise type: Aerobics.   (Last Updated: 7/8/2019 3:01:48 PM CDT by Jenny Alfred)          Sexual:        Sexually active: No.  Identifies as female, Sexual orientation: Straight or heterosexual.  History of STD: No.  Contraceptive Use Details: Intrauterine device.  History of sexual abuse: No.   (Last Updated: 7/8/2019 3:02:58 PM CDT by Jenny Alfred)          Other:        Regular menses.   (Last Updated: 7/8/2019 3:07:41 PM CDT by Jenny Alfred)

## 2022-02-15 NOTE — NURSING NOTE
CAGE Assessment Entered On:  6/23/2019 2:42 PM CDT    Performed On:  6/23/2019 2:42 PM CDT by Marylu Berry LPN               Assessment   Have you ever felt you should cut down on your drinking :   No   Have people annoyed you by criticizing your drinking :   No   Have you ever felt bad or guilty about your drinking :   No   Have you ever taken a drink first thing in the morning to steady your nerves or get rid of a hangover (Eye-opener) :   No   CAGE Score :   0    Marylu Berry LPN - 6/23/2019 2:42 PM CDT

## 2022-02-15 NOTE — TELEPHONE ENCOUNTER
---------------------  From: Hallie Aponte CMA (eRx Pool (32224_[facility]))   To: BRI Message Pool ([address]224_[place]);     Sent: 10/10/2019 7:58:49 AM CDT  Subject: FW: Medication Management   Due Date/Time: 10/10/2019 9:47:00 PM CDT     Patient has appt today with BRI at 6:30pm      ------------------------------------------  From: [facility] #[id number]  To: Torres Hill MD  Sent: October 9, 2019 9:47:38 PM CDT  Subject: Medication Management  Due: October 10, 2019 9:47:38 PM CDT    ** On Hold Pending Signature **  Drug: varenicline (Chantix 1 mg oral tablet)  TAKE ONE TABLET BY MOUTH TWICE DAILY   Quantity: 60 tab(s)  Days Supply: 30  Refills: 0  Substitutions Allowed  Notes from Pharmacy:     Dispensed Drug: varenicline (Chantix 1 mg oral tablet)  TAKE ONE TABLET BY MOUTH TWICE DAILY   Quantity: 60 tab(s)  Days Supply: 30  Refills: 0  Substitutions Allowed  Notes from Pharmacy:   ---------------------------------------------------------------  From: Cecile Ovalles MA (BRI Message Pool (32224_[facility]))   To: Torres Hill MD;     Sent: 10/10/2019 2:00:45 PM CDT  Subject: FW: Medication Management   Due Date/Time: 10/10/2019 9:47:00 PM CDT

## 2022-02-15 NOTE — PROGRESS NOTES
Patient:   NATALIE HASTINGS            MRN: [id number]            FIN: [id number]               Age:   36 years     Sex:  Female     :  1980   Associated Diagnoses:   Obesity   Author:   Torres Hill MD      Visit Information      Date of Service: 2017 08:00 am  Performing Location: [facility]  Encounter#: [id number]      Primary Care Provider (PCP):  Torres Hill MD    NPI# [id number]      Chief Complaint   2017 8:18 AM CDT    discuss wt loss and resuming Phentermine.        History of Present Illness   Here for follow up on weight loss.  She has seen Grecia Daniel for dietary education and is trying to exercise daily.  In the past was able to lose over 40 pounds with the aid of phentermine.  No side effects occurred with the medication and she tolerated it well.      Review of Systems   Constitutional:  No fever, No chills, No sweats, No weakness, No fatigue.    Eye:  No recent visual problem.    Ear/Nose/Mouth/Throat:  No decreased hearing, No nasal congestion, No sore throat.    Respiratory:  No shortness of breath, No cough.    Cardiovascular:  Negative, No chest pain, No palpitations, No peripheral edema.    Gastrointestinal:  No nausea, No vomiting, No diarrhea, No constipation, No heartburn.    Genitourinary:  No dysuria, No change in urine stream.    Hematology/Lymphatics:  No bruising tendency, No bleeding tendency.    Endocrine:  No cold intolerance, No heat intolerance.    Immunologic:  Negative.    Musculoskeletal:  No back pain, No neck pain, No joint pain, No muscle pain.    Integumentary:  No rash, No dryness, No skin lesion.    Neurologic:  Alert and oriented X4, No headache.    Psychiatric:  No anxiety, No depression.       Health Status   Allergies:    Allergic Reactions (Selected)  Severity Not Documented  Penicillin (Fever.. and flenching)  Sulfa drug (Itching)   Medications:  (Selected)   Prescriptions  Prescribed  phentermine 15 mg oral capsule: 1  cap(s) ( 15 mg ), PO, qam, # 30 cap(s), 0 Refill(s), Type: Maintenance, Pharmacy: [facility] #[id number], 1 cap(s) po qam,x30 day(s)  Documented Medications  Documented  Chantix: po, bid, 0 Refill(s), Type: Maintenance  Zyrtec 10 mg oral tablet: 1 tab(s) ( 10 mg ), po, daily, 0 Refill(s), Type: Maintenance  azelastine-fluticasone 137 mcg-50 mcg/inh nasal spray: 1 spray(s), nasal, bid, 0 Refill(s), Type: Maintenance  fluticasone 50 mcg/inh nasal spray: 1 spray(s), nasal, daily, 0 Refill(s), Type: Maintenance  multivitamin with minerals (w/ Iron): 0 Refill(s), Type: Maintenance   Problem list:    All Problems  Obesity / SNOMED CT 4601906893 / Probable      Histories   Past Medical History:    No active or resolved past medical history items have been selected or recorded.   Family History:    Anxiety  Sister  Depression  Mother  Father  Sister     Procedure history:    No active procedure history items have been selected or recorded.   Social History:        Tobacco Assessment            Current (some day smoker)        Physical Examination   Vital Signs   7/17/2017 8:18 AM CDT Temperature Tympanic 98.4 DegF    Peripheral Pulse Rate 84 bpm    Pulse Site Radial artery    HR Method Manual    Systolic Blood Pressure 124 mmHg    Diastolic Blood Pressure 74 mmHg    Mean Arterial Pressure 91 mmHg    BP Site Right arm    BP Method Manual      Measurements from flowsheet : Measurements   7/17/2017 8:18 AM CDT Height Measured - Standard 67 in    Weight Measured - Standard 211.6 lb    BSA 2.13 m2    Body Mass Index 33.14 kg/m2      General:  Alert and oriented, No acute distress.    Cardiovascular:  Normal rate, Regular rhythm.    Psychiatric:  Cooperative, Appropriate mood & affect.       Impression and Plan   Diagnosis     Obesity (SOB42-TX E66.09).     Course:  Unchanged.    Plan:  resume phentermine at 15 mg a day, follow up in one month.    Orders     Orders (Selected)   Prescriptions  Prescribed  phentermine 15 mg oral  capsule: 1 cap(s) ( 15 mg ), PO, qam, # 30 cap(s), 0 Refill(s), Type: Maintenance, Pharmacy: [facility] #[id number], 1 cap(s) po qam,x30 day(s).

## 2022-02-15 NOTE — NURSING NOTE
Depression Screening Entered On:  6/23/2019 2:42 PM CDT    Performed On:  6/23/2019 2:42 PM CDT by Marylu Berry LPN               Depression Screening   Little Interest - Pleasure in Activities :   Not at all   Feeling Down, Depressed, Hopeless :   Not at all   Initial Depression Screen Score :   0    Trouble Falling or Staying Asleep :   Not at all   Feeling Tired or Little Energy :   Not at all   Poor Appetite or Overeating :   Not at all   Feeling Bad About Yourself :   Not at all   Trouble Concentrating :   Several days   Moving or Speaking Slowly :   Not at all   Thoughts Better Off Dead or Hurting Self :   Not at all   Detailed Depression Screen Score :   1    Total Depression Screen Score :   1    JARVIS Difficulty with Work, Home, Others :   Somewhat difficult   Marylu Berry LPN - 6/23/2019 2:42 PM CDT

## 2022-02-15 NOTE — PROCEDURES
Accession Number:       [id number]  CLINICAL INFORMATION::     None given  LMP::     NONE GIVEN  PREV. PAP::     NONE GIVEN  PREV. BX::     NONE GIVEN  SOURCE::     None given  STATEMENT OF ADEQUACY::     Satisfactory for evaluation. Endocervical/transformation zone component present. Age and/or menstrual status not provided  INTERPRETATION/RESULT::     Negative for intraepithelial lesion or malignancy.  COMMENT::     This Pap test has been evaluated with computer assisted technology.  CYTOTECHNOLOGIST::     ZANE FERNANDES(ASCP) CT Screening location: [place] [id number]  COMMENT:     See comment       EXPLANATORY NOTE:         The Pap is a screening test for cervical cancer. It is       not a diagnostic test and is subject to false negative       and false positive results. It is most reliable when a       satisfactory sample, regularly obtained, is submitted       with relevant clinical findings and history, and when       the Pap result is evaluated along with historic and       current clinical information.  HPV mRNA E6/E7:     Not Detected       This test was performed using the APTIMA HPV Assay ([employer]       This assay detects E6/E7 viral messenger RNA (mRNA) from 14       high-risk HPV types (16,18,31,33,35,39,45,51,52,56,58,59,66,68).         The analytical performance characteristics of       this assay have been determined by [employer]. The modifications have not been       cleared or approved by the FDA. This assay has       been validated pursuant to the CLIA regulations       and is used for clinical purposes.

## 2022-02-15 NOTE — TELEPHONE ENCOUNTER
Entered by Nathaly Duong CMA on September 19, 2019 4:57:13 PM CDT  Patient seen q 4 months        ** Submitted: **  Order:fluticasone nasal (fluticasone 50 mcg/inh nasal spray)  2 spray(s)  Nasal  daily  Qty:  16 gm        Days Supply:  30        Refills:  2          DEVIN     Route To Pharmacy - [facility] #[id number]    Signed by Nathaly Duong CMA  9/19/2019 4:56:00 PM    ** Submitted: **  Complete:fluticasone nasal (fluticasone 50 mcg/inh nasal spray)   Signed by Nathaly Duong CMA  9/19/2019 4:56:00 PM    ** Not Approved:  **  fluticasone nasal (Fluticasone Propionate Nasal Suspension 50 MCG/ACT)  Instill 2 sprays into the nose daily  Qty:  16 gm        Days Supply:  30        Refills:  2          DEVIN     Route To Pharmacy - [facility] #[id number]   Signed by Nathaly Duong CMA            ------------------------------------------  From: [facility] #[id number]  To: Torres Hill MD  Sent: September 17, 2019 2:00:29 PM CDT  Subject: Medication Management  Due: September 18, 2019 2:00:29 PM CDT    ** On Hold Pending Signature **  Drug: fluticasone nasal (fluticasone 50 mcg/inh nasal spray)  Instill 2 sprays into the nose daily  Quantity: 16 gm       Days Supply: 30        Refills: 2  DEVIN  Notes from Pharmacy:     Dispensed Drug: fluticasone nasal (fluticasone 50 mcg/inh nasal spray)  Instill 2 sprays into the nose daily  Quantity: 16 gm       Days Supply: 30        Refills: 2  DEVIN  Notes from Pharmacy:     ** On Hold Pending Signature **  Drug: fluticasone nasal (fluticasone 50 mcg/inh nasal spray)  Instill 2 sprays into the nose daily  Quantity: 16 gm       Days Supply: 30        Refills: 2  DEVIN  Notes from Pharmacy:     Dispensed Drug: fluticasone nasal (fluticasone 50 mcg/inh nasal spray)  Instill 2 sprays into the nose daily  Quantity: 16 gm       Days Supply: 30        Refills: 2  DEVIN  Notes from Pharmacy:     ** On Hold Pending Signature **  Drug: fluticasone nasal (fluticasone 50 mcg/inh nasal spray)  Instill  2 sprays into the nose daily  Quantity: 16 gm       Days Supply: 30        Refills: 2  DEVIN  Notes from Pharmacy:     Dispensed Drug: fluticasone nasal (fluticasone 50 mcg/inh nasal spray)  Instill 2 sprays into the nose daily  Quantity: 16 gm       Days Supply: 30        Refills: 2  DEVIN  Notes from Pharmacy:   ------------------------------------------

## 2022-02-15 NOTE — PROGRESS NOTES
Patient:   NATALIE HASTINGS            MRN: [id number]            FIN: [id number]               Age:   36 years     Sex:  Female     :  1980   Associated Diagnoses:   Obesity; Tobacco use   Author:   Torres Hill MD      Visit Information      Date of Service: 2017 06:25 pm  Performing Location: [facility]  Encounter#: [id number]      Primary Care Provider (PCP):  Torres Hill MD    NPI# [id number]      Chief Complaint   2017 6:34 PM CDT    med check--Phentermine for wt loss.  also refill Chantix      History of Present Illness   Here for weight loss follow up.  Tolerating phentermine 15 mg a day.  Would like refill of Chantix.      Review of Systems   All other systems were reviewed and are negative.      Health Status   Allergies:    Allergic Reactions (Selected)  Severity Not Documented  Penicillin (Fever.. and flenching)  Sulfa drug (Itching)   Medications:  (Selected)   Prescriptions  Prescribed  phentermine 15 mg oral capsule: 1 cap(s) ( 15 mg ), PO, qam, # 30 cap(s), 0 Refill(s), Type: Maintenance, Pharmacy: [facility] #[id number], 1 cap(s) po qam,x30 day(s)  Documented Medications  Documented  Chantix: po, bid, 0 Refill(s), Type: Maintenance  Zyrtec 10 mg oral tablet: 1 tab(s) ( 10 mg ), po, daily, 0 Refill(s), Type: Maintenance  azelastine-fluticasone 137 mcg-50 mcg/inh nasal spray: 1 spray(s), nasal, bid, 0 Refill(s), Type: Maintenance  fluticasone 50 mcg/inh nasal spray: 1 spray(s), nasal, daily, 0 Refill(s), Type: Maintenance  multivitamin with minerals (w/ Iron): 0 Refill(s), Type: Maintenance   Problem list:    All Problems  Obesity / SNOMED CT 1485643372 / Probable      Histories   Past Medical History:    No active or resolved past medical history items have been selected or recorded.   Family History:    Anxiety  Sister  Depression  Mother  Father  Sister     Procedure history:    No active procedure history items have been selected or recorded.   Social  "A 79-year-old female with medical comorbidities significant for HTN, CAD, polycythemia vera, COPD, polycystic kidney disease, CKD stage 3 presents to the ED with a chief complaint of back pain. Pain began 3 days ago after lifting a full gallon of milk. Pt hurt her back while twisting away from the refrigerator.  Patient reports pain that she describes as a fist in the middle of my low back".  Patient has a history of chronic low back pain after a fall resulting in multiple lumbar fractures 6 years ago.  She reports that the pain occasionally radiates down bilateral lower extremities, however this is her baseline and not worse over the past 3 days after this new injury. Today, patient reports an episode of urinary incontinence which is not typical for her.  Patient reports noticing that she had urinated on herself and did not feel any sensation.  She denies any lower extremity weakness, numbness, saddle anesthesia, loss of bowel function.     MRI shows ring enhancing intradural lesion at level of cauda equina.     On exam pt with bilateral S1 dermatome radiculopathy. Otherwise intact. Rectal tone present. Corbett catheter in place.  " History:        Alcohol Assessment            Current, 3 times per week, 1 drinks/episode average.  2 drinks/episode maximum.      Tobacco Assessment            Current (some day smoker)        Physical Examination   Vital Signs   9/14/2017 6:34 PM CDT Temperature Tympanic 99.9 DegF    Peripheral Pulse Rate 88 bpm    Pulse Site Radial artery    HR Method Manual    Systolic Blood Pressure 120 mmHg    Diastolic Blood Pressure 80 mmHg    Mean Arterial Pressure 93 mmHg    BP Site Right arm    BP Method Manual      Measurements from flowsheet : Measurements   9/14/2017 6:34 PM CDT Height Measured - Standard 67 in    Weight Measured - Standard 199.6 lb    BSA 2.07 m2    Body Mass Index 31.26 kg/m2      General:  Alert and oriented, No acute distress.    Respiratory:  Respirations are non-labored.    Cardiovascular:  Normal rate.    Psychiatric:  Cooperative, Appropriate mood & affect.       Impression and Plan   Diagnosis     Obesity (NAW27-NF E66.09).     Tobacco use (RXK76-IR Z72.0).     Course:  Improving.    Plan:  continue with current dose of phentermine, refill varenicline, follow up in one month.    Orders     Orders (Selected)   Prescriptions  Prescribed  Chantix 1 mg oral tablet: 1 tab(s) ( 1 mg ), po, bid, # 60 tab(s), 2 Refill(s), Type: Maintenance, Pharmacy: [facility] #[id number], 1 tab(s) po bid  phentermine 15 mg oral capsule: 1 cap(s) ( 15 mg ), PO, qam, # 30 cap(s), 0 Refill(s), Type: Maintenance, Pharmacy: [facility] #[id number], 1 cap(s) po qam,x30 day(s).

## 2022-02-15 NOTE — TELEPHONE ENCOUNTER
---------------------  From: Cecile Ovalles MA (Nahid Sanchez (32224_[facility]))   To: NATALIE HASTINGS    Sent: 9/1/2020 10:39:52 AM CDT  Subject: RE: Prescription renewal request     Otf Peñaloza,    We have you scheduled for an annual exam with Dr. Hill for 9/15/2020.  Will be needing refills prior to your appointment or is it okay to wait until then?  If you need refills now, I'll send the request to another provider since Dr. Hill is out this week.    Sincerely,      Cecile BROWN CMA      ---------------------  From: NATALIE HASTINGS  To: [facility]  Sent: 09/01/2020 08:20 a.m. CDT  Subject: Prescription renewal request  NATALIE HASTINGS is requesting renewal of the prescription(s) below and has submitted the following details:  Prescription(s) to be renewed  Medication: amphetamine-dextroamphetamine 20 mg oral tablet  Dose: 1 tab(s)  Frequency: 2 times a day  Rx date: 08/03/2020  Prescribed by: Torres Hill MD  Reason for renewal:   Quantity:   Medication: fluticasone 50 mcg/inh nasal spray  Dose: 2 spray(s)  Frequency: daily  Rx date: 12/09/2019  Prescribed by: Torres Hill MD  Reason for renewal:   Quantity:   Medication: azelastine 137 mcg/inh (0.1%) nasal spray  Dose: 2 spray(s)  Frequency: 2 times a day  Rx date: 07/29/2019  Prescribed by: Torres Hill MD  Reason for renewal:   Quantity:   Delivery option  Send to my pharmacy  [facility] [address] [place] [address]  [place] [address]  Contact Information  By Secure Message  Comments  Annual physical & Follow up med review apts have been scheduled.

## 2022-02-15 NOTE — TELEPHONE ENCOUNTER
---------------------  From: Maura Piña RN ([employer] Messages Pool (33124[facility]))   To: GTG Message Pool ([id number]_[place]);     Sent: 12/22/2020 12:07:55 PM CST  Subject: Consumer message: Medication Refills           ---------------------  From: NATALIE BARTHOLOMEW  To: [facility]  Sent: 12/22/2020 11:57 a.m. CST  Subject: Medication Refills  Hello -  It appears the site is having trouble so it is not allowing me to refill mediciations thru the normal process.  Hopefully email works: Please refill my 4 medication. (chantix, 2 nasal sprays & adderrall).  I am requesting a day early due to the holiday & business closures.    Thank you!    Call with Questions.    Natalie Bartholomew  [phone]---------------------  From: Laurie Rodas LPN ([employer] Pool (48924_[facility]))   To: Torres Hill MD;     Sent: 12/22/2020 12:11:55 PM CST  Subject: FW: Consumer message: Medication Refills  ** Submitted: **  Order:varenicline (Chantix 1 mg oral tablet)  1 tab(s)  Oral  bid  Qty:  60 tab(s)        Days Supply:  30        Refills:  1          Substitutions Allowed     Route To [facility] #[id number]    Signed by Torres Hill MD  12/22/2020 7:34:00 PM UT    ** Submitted: **  Order:amphetamine-dextroamphetamine (amphetamine-dextroamphetamine 20 mg oral tablet)  1 tab(s)  Oral  bid  Qty:  60 tab(s)        Refills:  0          Substitutions Allowed     Route To Pharmacy [facility] #[id number]    Signed by Torres Hill MD  12/22/2020 7:34:00 PM UTCShe should have refills of the nasal sprays, if not ok to refill---------------------  From: Torres Hill MD   To: GTG Message Pool ([address]224_[place]);     Sent: 12/22/2020 1:36:25 PM CST  Subject: RE: Consumer message: Medication Refills---------------------  From: Laurie Rodas LPN ([facility] Message Pool (32224_[facility]))   To: NATALIE BARTHOLOMEW    Sent: 12/22/2020 2:01:00 PM CST  Subject: RE: Consumer  message: Medication Refills     Good Afternoon Dr Sergio Peñaloza has sent your medication to the pharmacy for you. Please contact the clinic with any questions or concerns.   Kind Regards,   OLIVA Sullivan.

## 2022-02-15 NOTE — PROGRESS NOTES
Patient:   NATALIE HASTINGS            MRN: [id number]            FIN: [id number]               Age:   39 years     Sex:  Female     :  1980   Associated Diagnoses:   ADD (attention deficit disorder)   Author:   Torres Hill MD      Visit Information      Date of Service: 2020 02:20 pm  Performing Location: [facility]  Encounter#: [id number]      Primary Care Provider (PCP):  Torres Hill MD    NPI# [id number]   Visit type:  Scheduled follow-up.    History limitation:  None.       Chief Complaint   2020 2:54 PM CST     ADD f/u, refill meds.      History of Present Illness             The patient presents with attention deficit disorder follow up.  There have been no problems with the medication.  Has noticed more fatigue in the last month.  Has history seasonal affective disorder.  Irritability has increased as well..        Review of Systems   Constitutional:  Negative.    Eye:  Negative.    Cardiovascular:  No palpitations, No tachycardia.    Gastrointestinal:  Negative.    Musculoskeletal:  Negative.    Neurologic:  Alert and oriented X4, No headache.    Psychiatric:  No anxiety, No depression.       Health Status   Allergies:    Allergic Reactions (Selected)  Severity Not Documented  Penicillin (Fever.. and flenching)  Sulfa drug (Itching)   Problem list:    All Problems  Chronic rhinitis / SNOMED CT 647339061 / Confirmed  Tobacco user / SNOMED CT 463303214 / Probable  Obesity / SNOMED CT 6083409769 / Probable  ADD (attention deficit disorder) / SNOMED CT 36524884 / Confirmed  Resolved: Pregnancy / SNOMED CT 707528128   Medications:  (Selected)   Prescriptions  Prescribed  Chantix 1 mg oral tablet: 1 tab(s), Oral, bid, # 60 tab(s), Type: Soft Stop, Pharmacy: [id number]  Chantix 1 mg oral tablet: 1 tab(s), Oral, bid, # 60 tab(s), Type: Soft Stop, Pharmacy: [id number]  Chantix 1 mg oral tablet: = 1 tab(s) ( 1 mg ), Oral, bid, # 60 tab(s), 1 Refill(s), Type:  Soft Stop, Pharmacy: [facility] #[id number], 1 tab(s) Oral bid  amphetamine-dextroamphetamine 15 mg oral tablet: = 1 tab(s) ( 15 mg ), Oral, bid, # 60 tab(s), 0 Refill(s), Type: Maintenance, Pharmacy: [facility] #[id number], 1 tab(s) Oral bid,x30 day(s)  azelastine 137 mcg/inh (0.1%) nasal spray: 2 spray(s), Nasal, bid, # 3 EA, 0 Refill(s), DEVIN, Type: Soft Stop, Pharmacy: [facility] #[id number], 2 spray(s) Nasal bid  azelastine 137 mcg/inh (0.1%) nasal spray: 2 spray(s), Nasal, bid, # 30 mL, 1 Refill(s), DEVIN, Type: Soft Stop, Pharmacy: [id number]  fluticasone 50 mcg/inh nasal spray: = 2 spray(s), Nasal, daily, # 3 EA, 0 Refill(s), DEVIN, Type: Maintenance, Pharmacy: [facility] #[id number], 2 spray(s) Nasal daily  Documented Medications  Documented  Zyrtec 10 mg oral tablet: 1 tab(s) ( 10 mg ), po, daily, 0 Refill(s), Type: Maintenance  multivitamin with minerals (w/ Iron): 0 Refill(s), Type: Maintenance      Histories   Past Medical History:    Active  Chronic rhinitis (357526428)  Resolved  Pregnancy (403801358): Onset on 12/13/2004 at 23 years.  Resolved on 9/20/2005 at 24 years.   Family History:    Anxiety  Sister  Breast cancer  Aunt (M)  Heart disease  Father  Migraine  Sister  Hypercholesterolemia  Father  Depression  Mother  Father  Sister  Skin cancer  Father     Procedure history:    Tonsillectomy and adenoidectomy (SNOMED CT 523467523).   Social History:        Alcohol Assessment: Current            Beer (12 oz), Wine (5 oz), Liquor (Hard) (1.5 oz), 3-5 times per week, 1 drinks/episode average.  2               drinks/episode maximum.  Ready to change: No.      Tobacco Assessment: Current            Current (some day smoker)            4 or less cigarettes(less than 1/4 pack)/day in last 30 days, Cigarettes, Total pack years: 20.  Ready to               change: Yes.      Substance Abuse Assessment: Past      Employment and Education Assessment            Work/School description: .  Highest  education level: 4 yr degree.      Home and Environment Assessment            Marital status: Single.  Living situation: Home/Independent.  Injuries/Abuse/Neglect in household: No.  Feels               unsafe at home: No.  Family/Friends available for support: Yes.      Nutrition and Health Assessment            Type of diet: Regular.  Wants to lose weight: No.  Sleeping concerns: No.  Feels highly stressed: No.      Exercise and Physical Activity Assessment: Occasional exercise            Exercise frequency: 3-4 times/week.  Exercise type: Aerobics.      Sexual Assessment            Sexually active: No.  Identifies as female, Sexual orientation: Straight or heterosexual.  History of STD:               No.  Contraceptive Use Details: Intrauterine device.  History of sexual abuse: No.      Other Assessment            Regular menses.        Physical Examination   Vital Signs   1/9/2020 2:54 PM CST Temperature Tympanic 98.4 DegF    Peripheral Pulse Rate 96 bpm    Pulse Site Radial artery    HR Method Manual    Systolic Blood Pressure 112 mmHg    Diastolic Blood Pressure 70 mmHg    Mean Arterial Pressure 84 mmHg    BP Site Right arm    BP Method Manual      Measurements from flowsheet : Measurements   1/9/2020 2:54 PM CST Height Measured - Standard 67 in    Weight Measured - Standard 187.2 lb    BSA 2 m2    Body Mass Index 29.32 kg/m2  HI      General:  Alert and oriented.    Eye:  Extraocular movements are intact.    Respiratory:  Lungs are clear to auscultation.    Cardiovascular:  Normal rate, Regular rhythm.    Psychiatric:  Cooperative, Appropriate mood & affect.       Review / Management   Course:  Progressing as expected.       Impression and Plan   Diagnosis     ADD (attention deficit disorder) ([id number] F98.8).     Course:  Incomplete response to treatment.    Plan:  Increase dose to 20 mg bid and follow up in one month., WIROBER queried and no problems identified.   .         Follow-up: In 4 months.    Orders    Counseled:  Patient, Regarding medications.    Patient Instructions:  Launch follow-up (if licensed).       Professional Services   Counseling Summary:  The total time spent counseling the patient was 15 minutes.

## 2022-02-15 NOTE — TELEPHONE ENCOUNTER
---------------------  From: Cecile Ovalles MA   To: ANABELA HASTINGS    Sent: 9/11/2019 3:59:29 PM CDT  Subject: Med refill     Good Afternoon, Anabela,    I'm informing your Adderall refill was sent in per request.  You will be due for a medication follow up next month with Dr. Hill for your next set of refills.  We did have someone else refill your medication as Dr. Hill is out of clinic this week.  If you have any further concerns, please let us know.    Sincerely,      Cecile BROWN MA

## 2022-02-15 NOTE — TELEPHONE ENCOUNTER
Entered by Zulma Macario CMA on February 06, 2019 10:59:58 AM CST  ---------------------  From: Zulma Macario CMA   To: [id number]    Sent: 2/6/2019 10:59:58 AM CST  Subject: Medication Management     ** Submitted: **  Order:azelastine nasal (azelastine 137 mcg/inh (0.1%) nasal spray)  2 spray(s)  Nasal  bid  Qty:  1 EA        Refills:  2          DEVIN     Route To [facility] #[id number]    Signed by Zulma Macario CMA  2/6/2019 10:59:00 AM    ** Submitted: **  Complete:azelastine nasal (azelastine 137 mcg/inh (0.1%) nasal spray)   Signed by Zulma Macario CMA  2/6/2019 10:59:00 AM    ** Not Approved:  **  azelastine nasal (Azelastine HCl Nasal Solution 0.1 %)  INHALE TWO SPRAYS INTO EACH NOSTRIL TWO TIMES DAILY  Qty:  30 mL        Days Supply:  25        Refills:  2          DEVIN     Route To [facility] #[id number]   Signed by Zulma Macario CMA            ** Submitted: **  Order:fluticasone nasal (fluticasone 50 mcg/inh nasal spray)  2 spray(s)  Nasal  daily  Qty:  16 gm        Days Supply:  30        Refills:  3          DEVIN     Route To [facility] #[id number]    Signed by Zulma Macario CMA  2/6/2019 10:58:00 AM    ** Submitted: **  Complete:fluticasone nasal (fluticasone 50 mcg/inh nasal spray)   Signed by Zulma Macario CMA  2/6/2019 10:59:00 AM    ** Not Approved:  **  fluticasone nasal (Fluticasone Propionate Nasal Suspension 50 MCG/ACT)  instill 2 sprays in each nostril once daily.  Qty:  16 gm        Days Supply:  30        Refills:  10          DEVIN     Route To [facility] #[id number]   Signed by Zulma Macario CMA            ------------------------------------------  From: [id number]  To: Torres Hill MD  Sent: February 6, 2019 10:53:49 AM CST  Subject: Medication Management  Due: February 7, 2019 10:53:49 AM CST    ** On Hold Pending Signature **  Drug: fluticasone nasal (fluticasone 50 mcg/inh nasal spray)  instill 2 sprays in each nostril once daily.  Quantity: 16 mL        Days Supply: 30        Refills: 10  Substitutions Allowed  Notes from Pharmacy:     Dispensed Drug: fluticasone nasal (fluticasone 50 mcg/inh nasal spray)  instill 2 sprays in each nostril once daily.  Quantity: 16 gm       Days Supply: 30        Refills: 10  Substitutions Allowed  Notes from Pharmacy:     ** On Hold Pending Signature **  Drug: azelastine nasal (azelastine 137 mcg/inh (0.1%) nasal spray)  INHALE TWO SPRAYS INTO EACH NOSTRIL TWO TIMES DAILY  Quantity: 30 mL       Days Supply: 25        Refills: 2  Substitutions Allowed  Notes from Pharmacy:     Dispensed Drug: azelastine nasal (azelastine 137 mcg/inh (0.1%) nasal spray)  INHALE TWO SPRAYS INTO EACH NOSTRIL TWO TIMES DAILY  Quantity: 30 mL       Days Supply: 25        Refills: 2  Substitutions Allowed  Notes from Pharmacy:   ------------------------------------------Date of last office visit and reason:  9/20/18- Weight Grecia Daniel       Date of last Med Check / Px:   5/4/18 GTG   Date of last labs pertaining to med:  n/a    RTC order in chart:  None    For Protocol refill, has patient been contacted:  _

## 2022-02-15 NOTE — PROGRESS NOTES
Chief Complaint    Verbal consent given for telemed visit. ADD follow up and medication refills. Would also like a refill of Chantix for tobacco use.   Patient is being evaluated via a billable telephone visit.   This telephone visit will be conducted via a call between you and your physician.   The patient has been notified of the following:   Today's visit was conducted via telephone due to the COVID-19 pandemic. Patient's consent to telephone visit was obtained and documented.  We have found that certain health care needs can be provided without need for a physical exam.  This service lets us provide the care your need with a short telephone conversation.  If a prescription is necessary, we can send it directly to your pharmacy.  If lab work is needed, we can place an order in your chart and you can schedule an appointment for the test/tests at a later time   Call Start Time: 1415   Call End Time: 1421  History of Present Illness      Patent presents for attention deficit disorder follow up.  There have been no problems with the medication. The current dose is controlling symptoms. There are no other concerns.  Review of Systems          ROS reviewed an negative except for symptoms noted in HPI.            Assessment/Plan       1. ADD (attention deficit disorder) (F98.8)         Well controlled        continue current medication       2. Tobacco user (Z72.0)         controlled with Chantix        refilled medication       Orders:         amphetamine-dextroamphetamine, = 1 tab(s) ( 20 mg ), Oral, bid, # 60 tab(s), 0 Refill(s), Type: Maintenance, Pharmacy: [facility][id number], 1 tab(s) Oral bid, (Ordered)         amphetamine-dextroamphetamine, = 1 tab(s) ( 20 mg ), Oral, bid, # 60 tab(s), 0 Refill(s), Type: Hard Stop, Pharmacy: [facility] #[id number], (Completed)         amphetamine-dextroamphetamine, = 1 tab(s) ( 20 mg ), Oral, bid, # 60 tab(s), 0 Refill(s), Type: Hard Stop, Pharmacy: [facility] #[id number], (Completed)          varenicline, = 1 tab(s) ( 1 mg ), Oral, bid, # 60 tab(s), 2 Refill(s), Type: Soft Stop, Pharmacy: [facility] #[id number], 1 tab(s) Oral bid, (Ordered)         varenicline, = 1 tab(s) ( 1 mg ), Oral, bid, # 60 tab(s), 1 Refill(s), Type: Hard Stop, Pharmacy: [facility] #[id number], (Completed)  Patient Information     Name:NATALIE HASTINGS      Address:      [address] [place] [id number]     Sex:Female     YOB: 1980     [phone]     Emergency Contact:ANU HASTINGS     MRN:[id number]     FIN:[id number]     Location:[facility]     Date of Service:05/12/2020      Primary Care Physician:       Torres Hill MD, [phone]      Attending Physician:       Torres Hill MD, [phone]  Problem List/Past Medical History    Ongoing     ADD (attention deficit disorder)     Chronic rhinitis     Obesity     Tobacco user    Historical     Pregnancy  Procedure/Surgical History     Tonsillectomy and adenoidectomy  Medications    amphetamine-dextroamphetamine 20 mg oral tablet, 20 mg= 1 tab(s), Oral, bid    azelastine 137 mcg/inh (0.1%) nasal spray, 2 spray(s), Nasal, bid, 1 refills    azelastine 137 mcg/inh (0.1%) nasal spray, 2 spray(s), Nasal, bid    Chantix 1 mg oral tablet, 1 mg= 1 tab(s), Oral, bid, 1 refills    Chantix 1 mg oral tablet, 1 tab(s), Oral, bid    Chantix 1 mg oral tablet, 1 tab(s), Oral, bid    fluticasone 50 mcg/inh nasal spray, 2 spray(s), Nasal, daily    multivitamin with minerals (w/ Iron)    Zyrtec 10 mg oral tablet, 10 mg= 1 tab(s), Oral, daily  Allergies    penicillin (flenching, Fever..)    sulfa drug (Itching)  Social History    Smoking Status - 05/12/2020     Current some day smoker     Alcohol - Current, 07/08/2019      Beer (12 oz), Wine (5 oz), Liquor (Hard) (1.5 oz), 3-5 times per week, 1 drinks/episode average. 2 drinks/episode maximum. Ready to change: No., 07/08/2019     Employment/School      Work/School description:  . Highest education level: 4 yr degree., 07/08/2019     Exercise - Occasional exercise, 07/08/2019      Exercise frequency: 3-4 times/week. Exercise type: Aerobics., 07/08/2019     Home/Environment      Marital status: Single. Living situation: Home/Independent. Injuries/Abuse/Neglect in household: No. Feels unsafe at home: No. Family/Friends available for support: Yes., 07/08/2019     Nutrition/Health      Type of diet: Regular. Wants to lose weight: No. Sleeping concerns: No. Feels highly stressed: No., 07/08/2019     Other      Regular menses., 07/08/2019     Sexual      Sexually active: No. Identifies as female, Sexual orientation: Straight or heterosexual. History of STD: No. Contraceptive Use Details: Intrauterine device. History of sexual abuse: No., 07/08/2019     Substance Abuse - Past, 07/08/2019     Tobacco - Current, 07/08/2019      4 or less cigarettes(less than 1/4 pack)/day in last 30 days, Cigarettes, Total pack years: 20. Ready to change: Yes., 07/08/2019      Current (some day smoker), 01/20/2014  Family History    Anxiety: Sister.    Breast cancer: Aunt (M).    Depression: Mother, Father and Sister.    Heart disease: Father.    Hypercholesterolemia: Father.    Migraine: Sister.    Skin cancer: Father.  Immunizations      Vaccine Date Status          Td 08/08/2011 Recorded          tetanus/diphth/pertuss (Tdap) adult/adol 08/08/2011 Recorded          typhoid, inactivated 02/02/2004 Recorded          Hep B 02/02/2004 Recorded          Hep A 02/02/2004 Recorded          Hep B 02/11/2002 Recorded          Hep B 11/16/2001 Recorded          Td 07/20/2000 Recorded          OPV 10/14/1982 Recorded          DTaP 10/14/1982 Recorded          MMR (measles/mumps/rubella) 08/12/1982 Recorded

## 2022-02-15 NOTE — TELEPHONE ENCOUNTER
---------------------  From: Fátima Shelton CMA (eRx Pool ([id number]_[facility]))   To: [facility] Message Pool ([id number]_[place] [facility] [place]);     Sent: 8/3/2020 8:10:08 AM CDT  Subject: FW: Prescription renewal request           ---------------------  From: NATALIE HASTINGS  [facility]  Sent: 08/01/2020 01:51 p.m. CDT  Subject: Prescription renewal request  NATALIE HASTINGS is requesting renewal of the prescription(s) below and has submitted the following details:  Prescription(s) to be renewed  Medication: Chantix 1 mg oral tablet  Dose: 1 tab(s)  Frequency: 2 times a day  Rx date: 07/02/2020  Prescribed by: Torres Hill MD  Reason for renewal:   Quantity:   Medication: amphetamine-dextroamphetamine 20 mg oral tablet  Dose: 1 tab(s)  Frequency: 2 times a day  Rx date: 07/02/2020  Prescribed by: Torres iHll MD  Reason for renewal:   Quantity:   Delivery option  [place] [address]  Contact Information  By Secure Message---------------------  From: Laurie Rodas LPN ([facility] Message Pool ([id number]_[facility]))   To: Torres Hill MD;     Sent: 8/3/2020 8:17:23 AM CDT  Subject: FW: Prescription renewal request  ** Submitted: **  Order:amphetamine-dextroamphetamine (amphetamine-dextroamphetamine 20 mg oral tablet)  1 tab(s)  Oral  bid  Qty:  60 tab(s)        Refills:  0          Substitutions Allowed     Route To [facility] #[id number]    Signed by Torres Hill MD  8/3/2020 6:40:00 PM UT    ** Submitted: **  Order:varenicline (Chantix 1 mg oral tablet)  1 tab(s)  Oral  bid  Qty:  60 tab(s)        Refills:  1          Route To [facility] #[id number]    Signed by Torres Hill MD  8/3/2020 6:40:00 PM UT---------------------  From: Torres Hill MD   To: [facility] Pool ([id number]_[place] [facility] [place]);     Sent: 8/3/2020 1:41:45 PM CDT  Subject: RE: Prescription renewal requestcontacted patient via phone  JACK that medication was sent to the pharmacy

## 2022-02-15 NOTE — NURSING NOTE
Comprehensive Intake Entered On:  10/21/2021 2:02 PM CDT    Performed On:  10/21/2021 1:57 PM CDT by Cecile Ovalles MA               Summary   Chief Complaint :   annual px, refill meds   Last Menstrual Period :   10/4/2021 CDT   Weight Measured :   169 lb(Converted to: 169 lb 0 oz, 76.657 kg)    Height Measured :   67 in(Converted to: 5 ft 7 in, 170.18 cm)    Body Mass Index :   26.47 kg/m2 (HI)    Body Surface Area :   1.9 m2   Systolic Blood Pressure :   123 mmHg   Diastolic Blood Pressure :   73 mmHg   Mean Arterial Pressure :   90 mmHg   Peripheral Pulse Rate :   96 bpm   BP Site :   Right arm   Pulse Site :   Radial artery   BP Method :   Electronic   HR Method :   Electronic   Temperature Tympanic :   96.1 DegF(Converted to: 35.6 DegC)  (LOW)    Cecile Ovalles MA - 10/21/2021 1:57 PM CDT   Health Status   Allergies Verified? :   Yes   Medication History Verified? :   Yes   Medical History Verified? :   Yes   Pre-Visit Planning Status :   Completed   Tobacco Use? :   Current every day smoker   Cecile Ovalles MA - 10/21/2021 1:57 PM CDT   Consents   Consent for Immunization Exchange :   Consent Granted   Consent for Immunizations to Providers :   Consent Granted   Cecile Ovalles MA - 10/21/2021 1:57 PM CDT   Meds / Allergies   (As Of: 10/21/2021 2:02:34 PM CDT)   Allergies (Active)   penicillin  Estimated Onset Date:   Unspecified ; Reactions:   Fever.., flenching ; Created By:   Cecile Mas; Reaction Status:   Active ; Category:   Drug ; Substance:   penicillin ; Type:   Allergy ; Updated By:   Cecile Mas; Reviewed Date:   6/20/2019 2:05 PM CDT      sulfa drug  Estimated Onset Date:   Unspecified ; Reactions:   Itching ; Created By:   Cecile Mas; Reaction Status:   Active ; Category:   Drug ; Substance:   sulfa drug ; Type:   Allergy ; Updated By:   Cecile Mas; Reviewed Date:   6/20/2019 2:05 PM CDT        Medication List   (As Of: 10/21/2021 2:02:34 PM CDT)   Prescription/Discharge Order     amphetamine-dextroamphetamine  :   amphetamine-dextroamphetamine ; Status:   Prescribed ; Ordered As Mnemonic:   amphetamine-dextroamphetamine 20 mg oral tablet ; Simple Display Line:   20 mg, 1 tab(s), Oral, bid, 60 tab(s), 0 Refill(s) ; Ordering Provider:   Torres Hill MD; Catalog Code:   amphetamine-dextroamphetamine ; Order Dt/Tm:   10/6/2021 12:27:50 PM CDT          azelastine nasal  :   azelastine nasal ; Status:   Prescribed ; Ordered As Mnemonic:   azelastine 137 mcg/inh (0.1%) nasal spray ; Simple Display Line:   2 spray(s), Nasal, bid, 3 EA, 1 Refill(s) ; Ordering Provider:   Torres Hill MD; Catalog Code:   azelastine nasal ; Order Dt/Tm:   4/15/2021 11:17:23 AM CDT          azelastine 137 mcg/inh (0.1%) nasal spray  :   azelastine 137 mcg/inh (0.1%) nasal spray ; Status:   Prescribed ; Ordered As Mnemonic:   azelastine 137 mcg/inh (0.1%) nasal spray ; Simple Display Line:   2 spray(s), Nasal, bid, 30 mL, 1 Refill(s) ; Ordering Provider:   Torres Hill MD; Catalog Code:   azelastine nasal ; Order Dt/Tm:   7/29/2019 5:03:36 PM CDT          fluticasone nasal  :   fluticasone nasal ; Status:   Prescribed ; Ordered As Mnemonic:   fluticasone 50 mcg/inh nasal spray ; Simple Display Line:   2 spray(s), Nasal, daily, 3 EA, 1 Refill(s) ; Ordering Provider:   Torres Hill MD; Catalog Code:   fluticasone nasal ; Order Dt/Tm:   4/15/2021 11:17:24 AM CDT          varenicline  :   varenicline ; Status:   Prescribed ; Ordered As Mnemonic:   varenicline 0.5 mg-1 mg oral tablet ; Simple Display Line:   See Instructions, Take as directed, 1 kit(s), 0 Refill(s) ; Ordering Provider:   Torres Hill MD; Catalog Code:   varenicline ; Order Dt/Tm:   7/7/2021 10:59:50 AM CDT            Home Meds    cetirizine  :   cetirizine ; Status:   Documented ; Ordered As Mnemonic:   Zyrtec 10 mg oral tablet ; Simple Display Line:   10 mg, 1 tab(s), po, daily ; Catalog Code:   cetirizine ; Order Dt/Tm:    8/6/2013 2:46:04 PM CDT          multivitamin with minerals  :   multivitamin with minerals ; Status:   Documented ; Ordered As Mnemonic:   multivitamin with minerals (w/ Iron) ; Simple Display Line:   0 Refill(s) ; Catalog Code:   multivitamin with minerals ; Order Dt/Tm:   6/6/2017 8:27:56 AM CDT            Social History   Social History   (As Of: 10/21/2021 2:02:34 PM CDT)   Alcohol:  Current      Beer (12 oz), Wine (5 oz), Liquor (Hard) (1.5 oz), 3-5 times per week, 1 drinks/episode average.  2 drinks/episode maximum.  Ready to change: No.   (Last Updated: 7/8/2019 3:01:09 PM CDT by Jenny Alfred)          Tobacco:  Current      4 or less cigarettes(less than 1/4 pack)/day in last 30 days, Cigarettes, Total pack years: 20.  Ready to change: Yes.   (Last Updated: 2/17/2021 9:00:29 AM CST by Cecile Ovalles MA)          Electronic Cigarette/Vaping:        Electronic Cigarette Use: Never.   (Last Updated: 2/16/2021 4:21:48 PM CST by Cecile Ovalles MA)          Substance Abuse:  Past      (Last Updated: 7/8/2019 2:54:30 PM CDT by Jenny Alfred )         Employment/School:        Work/School description: .  Highest education level: 4 yr degree.   (Last Updated: 7/8/2019 3:02:16 PM CDT by Jenny Alfred)          Home/Environment:        Marital status: Single.  Living situation: Home/Independent.  Injuries/Abuse/Neglect in household: No.  Feels unsafe at home: No.  Family/Friends available for support: Yes.   (Last Updated: 7/8/2019 2:55:13 PM CDT by Jenny Alferd)   Marital status: Single.  Lives with Children, Roomate(s)/Friend(s).   (Last Updated: 2/16/2021 4:21:48 PM CST by Cecile Ovalles MA)          Nutrition/Health:        Type of diet: Regular.  Wants to lose weight: No.  Sleeping concerns: No.  Feels highly stressed: No.   (Last Updated: 7/8/2019 3:01:35 PM CDT by Jenny Alfred)          Exercise:  Occasional exercise      Exercise frequency: 3-4 times/week.  Exercise type:  Aerobics.   (Last Updated: 7/8/2019 3:01:48 PM CDT by Jenny Alfred)          Sexual:        Sexually active: Yes.  Identifies as female.  Sexual orientation: Straight or heterosexual.  Contraceptive Use Details: Intrauterine device.  History of STD: No.  History of sexual abuse: No.   (Last Updated: 10/21/2021 1:57:46 PM CDT by Cecile Ovalles MA)          Other:        Regular menses.   (Last Updated: 7/8/2019 3:07:41 PM CDT by Jenny Alfred)

## 2022-02-15 NOTE — TELEPHONE ENCOUNTER
---------------------  From: Fátima Shelton CMA (Phone Messages Pool ([id number]_[facility]))   To: [facility] Message Pool ([address]_[place]);     Sent: 7/24/2019 1:14:56 PM CDT  Subject: FW: Rejirisch: Med Check, pt2           ---------------------  From: NATALIE HASTINGS  To: [facility]  Sent: 07/24/2019 01:14 p.m. CDT  Subject: Globerisch: Med Check, pt2  Good Afternoon Doc -    Checking in on the medication: I m not noticing any consistent change in motivation or focus. Inattention and distraction still seem to be my day-to-day/regular.The first few mornings (after the increase) seemed to come into focus a little better, so i was motivated for them to turn into project days, but the focus didn t last after a couple hours. I ve continued to keep my book with me in hopes of reading it, but haven t even picked it up once, unfortunately.  I have noticed a few side effects: clenching teeth, appetite change. They don t seem to be overpowering or overcome-able.---------------------  From: Cecile Ovalles MA ([facility] Message Pool ([address]224[facility]))   To: Torres Hill MD;     Sent: 7/24/2019 1:22:03 PM CDT  Subject: FW: Rejirisch: Med Check, pt2  ** Submitted: **  Order:amphetamine-dextroamphetamine (amphetamine-dextroamphetamine 15 mg oral tablet)  1 tab(s)  Oral  bid  Qty:  28 tab(s)        Duration:  14 day(s)        Refills:  0          Substitutions Allowed     Route To Pharmacy - [facility] #[id number]    Signed by Torres Hill MD  7/25/2019 5:57:00 PM    ** Submitted: **  Complete:methylphenidate (methylphenidate 36 mg/24 hr oral tablet, extended release)   Signed by Torres Hill MD  7/25/2019 5:57:00 PM---------------------  From: Torres Hill MD   To: [facility] [employer] Pool ([address]_[place]);     Sent: 7/25/2019 5:59:30 PM CDT  Subject: RE: Gema: Med Check, pt2     Trial of Adderall IR for 2 weeksnotified patient via portal

## 2022-02-15 NOTE — LETTER
(Inserted Image. Unable to display)     January 18, 2021      NATALIE HASTINGS  [address] [place], [place] [id number]          Dear NATALIE,      Thank you for selecting [facility] (previously [facility]) for your healthcare needs.    Our records indicate you are due for the following services:     Follow-up office visit / Medication Check.    (FYI   Regarding office visits: In some instances, a video visit or telephone visit may be offered as an option.)      To schedule an appointment or if you have further questions, please contact your clinic at [phone].      Powered by [employer]    Sincerely,    Torres Hill MD

## 2022-02-15 NOTE — TELEPHONE ENCOUNTER
---------------------  From: Ad/Arely NICHOLAS (Phone Messages Pool (32224_[facility]))   To: [facility] Message Pool ([address]_[place]);     Sent: 8/4/2021 9:29:41 AM CDT  Subject: General Message     Phone Message    PCP: BRI    Time of Call: 0925    Phone Number: [phone]    Returned call at: n/a    Note: Janay from [facility] states that Chantix 1mg tab was sent in for pt, however, this is on back order and they are requesting for off brand  Varenicline 1mg tab to be sent in.    Please advise     Pharmacy: [facility]    Last office visit and reason: ADD f/u 7/7/21    Transferred to: [employer]---------------------  From: Cecile Ovalles MA ([employer] Message Pool ([address]_[facility]))   To: Torres Hill MD;     Sent: 8/4/2021 9:49:16 AM CDT  Subject: FW: General Message---------------------  From: Torres Hill MD   To: [employer] Message Pool ([address]_[place]);     Sent: 8/4/2021 12:35:33 PM CDT  Subject: RE: General Message     The only other option is bupropion  mg bid, #60 with two refillsCalled/spoke w/ pharmacist re: rx change per [facility].

## 2022-02-15 NOTE — TELEPHONE ENCOUNTER
---------------------  From: Hallie Aponte CMA ([employer] Pool (32224_[facility]))   To: [employer] Pool ([address]_[place]);     Sent: 1/20/2021 1:39:20 PM CST  Subject: FW: Prescription renewal request       Nasal sprays refilled  Adderall last filled 12/22/20 #60, 0  9/15/20 annual exam  rtc January for ADD (reminder letter sent 1/18/21)    ---------------------  From: NATALIE HASTINGS  To: [facility]  Sent: 01/20/2021 01:06 p.m. CST  Subject: Prescription renewal request  NATALIE HASTINGS is requesting renewal of the prescription(s) below and has submitted the following details:  Prescription(s) to be renewed  Medication: amphetamine-dextroamphetamine 20 mg oral tablet  Dose: 1 tab(s)  Frequency: 2 times a day  Rx date: 12/22/2020  Prescribed by: Torres Hill MD  Reason for renewal:   Quantity:   Medication: fluticasone 50 mcg/inh nasal spray  Dose: 2 spray(s)  Frequency: daily  Rx date: 11/24/2020  Prescribed by: Torres Hill MD  Reason for renewal:   Quantity:   Medication: azelastine 137 mcg/inh (0.1%) nasal spray  Dose: 2 spray(s)  Frequency: 2 times a day  Rx date: 11/24/2020  Prescribed by: Torres Hill MD  Reason for renewal:   Quantity:   Delivery option  Send [place] [address]  Contact Information  By Secure Message---------------------  From: Cecile Ovalles MA ([employer] Message Pool ([address]224_[facility]))   To: Torres Hill MD;     Sent: 1/20/2021 1:42:01 PM CST  Subject: FW: Prescription renewal request  ** Submitted: **  Order:amphetamine-dextroamphetamine (amphetamine-dextroamphetamine 20 mg oral tablet)  1 tab(s)  Oral  bid  Qty:  60 tab(s)        Refills:  0          Substitutions Allowed     Route To Pharmacy [facility] #[id number]    Signed by Torres Hill MD  1/20/2021 9:16:00 PM [facility]---------------------  From: Torres Hill MD   To: [facility] Pool ([address]_[place]);     Sent: 1/20/2021  3:17:36 PM CST  Subject: RE: Prescription renewal request---------------------  From: Cecile Ovalles MA ([employer] Pool (32224_[facility]))   To: NATALIE HASTINGS    Sent: 1/20/2021 3:24:22 PM CST  Subject: FW: Prescription renewal request     Dr. Sergio Stephens sent in a refill to [facility] pharmacy as directed. It looks like you are due for a med check for further refills.   Please call the clinic to schedule your appointment at your earliest convenience.    Cecile BROWN CMA

## 2022-02-15 NOTE — PROGRESS NOTES
Chief Complaint    annual px, refill meds  History of Present Illness      General health status:  good      Diet:  regular      Exercise:  walking      Medical encounters:  none      Dental exam:  4/21      Eye exam:  2/21      Caffeine use:  daily      Tobacco use:  1 ppd      Alcohol use:  occasional      Lipid and diabetes screening:  n/a      Colon cancer screening:  n/a      Mammogram:   discussed      Bone health:  n/a      Pap smear:  due 2024      LMP:  10/4/21      Immunizations:  due for influenza      Other concerns:  no      Chronic disease:  chronic rhinitis is well controlled      Patent presents for attention deficit disorder follow up.  There have been no problems with the medication. The current dose is controlling symptoms. There are no other concerns.  Review of Systems          Constitutional:  No fever, No chills, No sweats, No weakness, No fatigue.            Eye:  No recent visual problem.            Ear/Nose/Mouth/Throat:  No decreased hearing, No nasal congestion, No sore throat.            Respiratory:  No shortness of breath, No cough.            Cardiovascular:  Negative, No chest pain, No palpitations, No peripheral edema.            Gastrointestinal:  No nausea, No vomiting, No diarrhea, No constipation, No heartburn.            Genitourinary:  No dysuria, No change in urine stream.            Hematology/Lymphatics:  No bruising tendency, No bleeding tendency.            Endocrine:  No cold intolerance, No heat intolerance.            Immunologic:  Negative.            Musculoskeletal:  No back pain, No neck pain, No joint pain, No muscle pain.            Integumentary:  No rash, No dryness, No skin lesion.            Neurologic:  Alert and oriented X4, No headache.                Psychiatric:  No anxiety, No depression  Physical Exam   Vitals & Measurements    T: 96.1  F (Tympanic)  HR: 96 (Peripheral)  BP: 123/73     HT: 67 in  WT: 169 lb  BMI: 26.47           General:  Alert and  oriented, No acute distress.            Eye:  Pupils are equal, round and reactive to light, Extraocular movements are intact, Normal conjunctiva.            HENT:  Normocephalic, Tympanic membranes are clear, Oral mucosa is moist, No pharyngeal erythema, No sinus tenderness.            Neck:  Supple, Non-tender, No carotid bruit, No lymphadenopathy, No thyromegaly.            Respiratory:  Lungs are clear to auscultation, Respirations are non-labored, Breath sounds are equal, No chest wall tenderness.            Cardiovascular:  Normal rate, Regular rhythm, No murmur, No gallop, Normal peripheral perfusion, No edema.            Breast:  No mass, No tenderness, No discharge.            Gastrointestinal:  Soft, Non-tender, Normal bowel sounds, No organomegaly.            Genitourinary:  No costovertebral angle tenderness.                 Labia: Within normal limits.                 Urethra: Within normal limits.                 Vagina: Within normal limits.                 Ovaries: Within normal limits.                 Adnexa: no masses or tenderness.            Musculoskeletal:  Normal range of motion, Normal strength, No swelling, No deformity.            Integumentary:  Warm, Dry, No rash.            Neurologic:  Alert, Oriented, Normal sensory, Normal motor function, No focal deficits, Normal deep tendon reflexes.            Psychiatric:  Cooperative, Appropriate mood & affect.  Assessment/Plan       1. Annual physical exam (Z00.00)         Discussed diet, weight management, BMI, activity and exercise        Follow up in one year       2. ADD (attention deficit disorder) (F98.8)         Doing well, continue same dose        refill for 2 months        follow up in 4 months        PDMP queried, no concerns       3. Chronic rhinitis (J31.0)         controlled, continue current medication       4. Tobacco user (Z72.0)         encouraged cessation       Orders:         amphetamine-dextroamphetamine, = 1 tab(s) ( 20 mg  ), Oral, bid, # 60 tab(s), 0 Refill(s), Type: Hard Stop, Pharmacy: [facility] #[id number], 67, in, 10/21/21 13:57:00 CDT, Height Measured, 169, lb, 10/21/21 13:57:00 CDT, Weight Measured, (Completed)         amphetamine-dextroamphetamine, = 1 tab(s) ( 20 mg ), Oral, bid, # 60 tab(s), 0 Refill(s), Type: Hard Stop, Pharmacy: [facility] #[id number], 67, in, 07/07/21 10:16:00 CDT, Height Measured, 173, lb, 07/07/21 10:16:00 CDT, Weight Measured, (Completed)         amphetamine-dextroamphetamine, = 1 tab(s) ( 20 mg ), Oral, bid, # 60 tab(s), 0 Refill(s), Type: Maintenance, Pharmacy: [facility] #[id number], 1 tab(s) Oral bid, 67, in, 10/21/21 13:57:00 CDT, Height Measured, 169, lb, 10/21/21 13:57:00 CDT, Weight Measured, (Ordered)         azelastine nasal, 2 spray(s), Nasal, bid, # 3 EA, 1 Refill(s), DEVIN, Type: Hard Stop, Pharmacy: [facility] #[id number], 67, in, 02/17/21 9:00:00 CST, Height Measured, 178.6, lb, 02/17/21 9:00:00 CST, Weight Measured, (Completed)         azelastine nasal, 2 spray(s), Nasal, bid, # 3 EA, 1 Refill(s), DEVIN, Type: Soft Stop, Pharmacy: [facility] #[id number], 2 spray(s) Nasal bid, 67, in, 10/21/21 13:57:00 CDT, Height Measured, 169, lb, 10/21/21 13:57:00 CDT, Weight Measured, (Ordered)         fluticasone nasal, = 2 spray(s), Nasal, daily, # 3 EA, 1 Refill(s), DEVIN, Type: Maintenance, Pharmacy: [facility] #[id number], 2 spray(s) Nasal daily, 67, in, 10/21/21 13:57:00 CDT, Height Measured, 169, lb, 10/21/21 13:57:00 CDT, Weight Measured, (Ordered)         fluticasone nasal, = 2 spray(s), Nasal, daily, # 3 EA, 1 Refill(s), DEVIN, Type: Hard Stop, Pharmacy: [facility] #[id number], 67, in, 02/17/21 9:00:00 CST, Height Measured, 178.6, lb, 02/17/21 9:00:00 CST, Weight Measured, (Completed)         Return to Clinic (Request), RFV: Yearly exam/physical and pap, Return in 1 year         Return to Clinic (Request), RFV: Yearly exam/physical, Return in 1 year         Return to Clinic (Request), RFV: ADHD f/u per GTG, Return in 3  weeks  Patient Information     Name:NATALIE HASTINGS      Address:      [address] [place] [phone]     Sex:Female     YOB: 1980     [phone]     Emergency Contact:ANU HASTINGS     MRN:[id number]     FIN:[id number]     Location:[facility]     Date of Service:10/21/2021      Primary Care Physician:       Torres Hill MD, [phone]      Attending Physician:       Torres Hill MD, [phone]  Problem List/Past Medical History    Ongoing     ADD (attention deficit disorder)     Chronic rhinitis     Obesity     Tobacco user    Historical     Pregnancy  Procedure/Surgical History     Date of last PAP test (06/20/2019)      Comments: Due 2024.     Tonsillectomy and adenoidectomy  Medications    amphetamine-dextroamphetamine 20 mg oral tablet, 20 mg= 1 tab(s), Oral, bid    azelastine 137 mcg/inh (0.1%) nasal spray, 2 spray(s), Nasal, bid, 1 refills    azelastine 137 mcg/inh (0.1%) nasal spray, 2 spray(s), Nasal, bid, 1 refills    fluticasone 50 mcg/inh nasal spray, 2 spray(s), Nasal, daily, 1 refills    multivitamin with minerals (w/ Iron)    varenicline 0.5 mg-1 mg oral tablet, See Instructions    Zyrtec 10 mg oral tablet, 10 mg= 1 tab(s), Oral, daily  Allergies    penicillin (flenching, Fever..)    sulfa drug (Itching)  Social History    Smoking Status     Current every day smoker     Alcohol - Current      Beer (12 oz), Wine (5 oz), Liquor (Hard) (1.5 oz), 3-5 times per week, 1 drinks/episode average. 2 drinks/episode maximum. Ready to change: No.     Electronic Cigarette/Vaping      Electronic Cigarette Use: Never.     Employment/School      Work/School description: . Highest education level: 4 yr degree.     Exercise - Occasional exercise      Exercise frequency: 3-4 times/week. Exercise type: Aerobics.     Home/Environment      Marital status: Single. Lives with Children, Roomate(s)/Friend(s).     Nutrition/Health      Type of  diet: Regular. Wants to lose weight: No. Sleeping concerns: No. Feels highly stressed: No.     Other      Regular menses.     Sexual      Sexually active: Yes. Identifies as female. Sexual orientation: Straight or heterosexual. Contraceptive Use Details: Intrauterine device. History of STD: No. History of sexual abuse: No.     Substance Abuse - Past     Tobacco - Current      4 or less cigarettes(less than 1/4 pack)/day in last 30 days, Cigarettes, Total pack years: 20. Ready to change: Yes.  Family History    Anxiety: Sister.    Breast cancer: Aunt (M).    Depression: Mother, Father and Sister.    Heart disease: Father.    Hypercholesterolemia: Father.    Migraine: Sister.    Skin cancer: Father.  Immunizations       Scheduled Immunizations       Dose Date(s)       DTaP       10/14/1982       Hep B       02/11/2002, 11/16/2001, 02/02/2004       MMR (measles/mumps/rubella)       08/12/1982       OPV       10/14/1982       Td       07/20/2000       tetanus/diphth/pertuss (Tdap) adult/adol       08/15/2021       Other Immunizations               Hep A       02/02/2004       typhoid, inactivated       02/02/2004       influenza virus vaccine, inactivated       09/15/2020       Td       08/08/2011       tetanus/diphth/pertuss (Tdap) adult/adol       08/08/2011       SARS-CoV-2 (COVID-19) Moderna-1273       07/07/2021, 08/05/2021

## 2022-02-15 NOTE — TELEPHONE ENCOUNTER
---------------------  From: Laurie Rodas LPN   To: NATALIE HASTINGS    Sent: 8/17/2020 4:39:35 PM CDT  Subject: General Message     Good Afternoon Dr Sergio Peñaloza would like you to schedule a visit to discuss medication and do a yearly exam. Your Last Physical was over a year ago on 06/20/2019.  This could be done when you are due for your next follow up with Priyal. Please contact the clinic to schedule this appointment.   Thanks,   Laurie BAPTISTE.

## 2022-02-15 NOTE — TELEPHONE ENCOUNTER
---------------------  From: Cecile Ovalles MA ([employer] ([address]224_[facility]))   To: Torres Hill MD;     Sent: 5/15/2021 10:32:47 AM CDT  Subject: FW: Medication Management   Due Date/Time: 5/15/2021 2:18:00 PM CDT     LV:  2/17/2021 w/ [facility] for ADHD f/u; RTC due June 2021.  LR:  4/15/2021 #60.      ------------------------------------------  From: [id number]  To: Torres Hill MD  Sent: May 14, 2021 2:18:31 PM CDT  Subject: Medication Management  Due: May 14, 2021 8:13:56 PM CDT     ** On Hold Pending Signature **     Drug: amphetamine-dextroamphetamine (amphetamine-dextroamphetamine 20 mg oral tablet), TAKE ONE TABLET BY MOUTH TWICE DAILY  Quantity: 60 tab(s)  Days Supply: 30  Refills: 0  Substitutions Allowed  Notes from Pharmacy:     Dispensed Drug: amphetamine-dextroamphetamine (amphetamine-dextroamphetamine 20 mg oral tablet), TAKE ONE TABLET BY MOUTH TWICE DAILY  Quantity: 60 tab(s)  Days Supply: 30  Refills: 0  Substitutions Allowed  Notes from Pharmacy:  ------------------------------------------  ** Submitted: **  Order:amphetamine-dextroamphetamine (amphetamine-dextroamphetamine 20 mg oral tablet)  1 tab(s)  Oral  bid  Qty:  60 tab(s)        Refills:  0          Substitutions Allowed     Route To Pharmacy - [facility] #[id number]    Signed by Torres Hill MD  5/17/2021 2:15:00 AM [facility]

## 2022-02-15 NOTE — TELEPHONE ENCOUNTER
---------------------  From: Cecile Ovalles MA ([employer] Pool (32224_[facility]))   To: Torres Hill MD;     Sent: 6/26/2019 8:26:46 AM CDT  Subject: FW: FW: RESEND           ---------------------  From: ANABELA HASTINGS  To: [employer] Pool (32224_[facility])  Sent: 06/25/2019 07:47 p.m. CDT  Subject: RE: FW: RESEND  Thank you, Cecile. Should I schedule a follow up visit in a couple weeks, or can the  med check  be done via portal communitcation?       Addendum by Cecile Ovalles MA on June 25, 2019 9:29:08 AM CDT  ---------------------  From: Cecile Ovalles MA ([facility] Message Pool ([address]224_[facility]))  To: ANABELA HASTINGS  Sent: 6/25/2019 9:29:08 AM CDT  Subject: FW: RESEND       Good MorningAnabela,       Dr. Hill received your message from yesterday and did send in a prescription that he wants you to try for a couple of weeks. We will also refer you to [facility] for ADD evaluation per Dr. Hill.  One of our referral clerks will be contacting you to help you get set up.  If you have any further concerns, please let us know.       Sincerely,       Cecile BROWN MA/Dr. Hill       Addendum by Cecile Ovalles MA on June 25, 2019 9:26:26 AM CDT       ** Submitted: **  Order:Referral (Request)  Details:  6/25/2019 9:24 AM CDT, Referred to: Other, Referred to: [facility], Reason for referral: ADD evaluation, Adult ADHD  Signed by Cecile Ovalles MA  6/25/2019 9:24:00 AM       Addendum by Torres Hill MD on June 25, 2019 8:35:31 AM CDT  ---------------------  From: Torres Hill MD  To: [employer] Message Pool ([address]224_[facility]);  Sent: 6/25/2019 8:35:31 AM CDT  Subject: RE: RESEND       Addendum by Torres Hill MD on June 25, 2019 8:35:28 AM CDT  Will have her start with a 2 week supply       Addendum by Torres Hill MD on June 25, 2019 8:35:05 AM CDT       ** Submitted: **  Order:methylphenidate (methylphenidate 27 mg/24 hr oral tablet,  extended release)  1 tab(s)  Oral  qam  Qty:  14 tab(s)        Duration:  14 day(s)        Refills:  0  Substitutions Allowed     Route To Pharmacy - [facility] #[id number]  Signed by Torres Hill MD  6/25/2019 8:33:00 AM       Addendum by Torres Hill MD on June 25, 2019 8:33:05 AM CDT  ---------------------  From: Torres Hill MD  To: [employer] Message Pool (32224_[facility]);  Sent: 6/25/2019 8:33:05 AM CDT  Subject: RE: RESEND       I will start her at Concerta dose of 27 mg a day.  OK to proceed with a referral to her to [facility] for ADD evaluation       Addendum by Cecile Ovalles MA on June 25, 2019 8:09:06 AM CDT  ---------------------  From: Cecile Ovalles MA ([employer] Message Pool (32224_[facility]))  To: Torres Hill MD;  Sent: 6/25/2019 8:09:06 AM CDT  Subject: FW: RESEND  ---------------------  From: Sarah Garcia LPN (Phone Messages Pool (32224_[facility]))  To: [employer] Message Pool (32224_[facility]); NATALIE HASTINGS  Sent: 6/24/2019 8:40:01 AM CDT  Subject: FW: RESEND       Otf Peñaloza,  I am forwarding your message onto Dr. Hill for review tomorrow when he is back in clinic.  Thanks,  Sarah            ---------------------  From: NATALIE HASTINGS  To: [facility]  Sent: 06/21/2019 05:21 p.m. CDT  Subject: RESEND  Hi Doc -  The current medication & dosage is Methylphenidate HCI ER 36 MG (Concerta).  & he had been referred to [facility], [address] [place] [address] [address], [address] [address], for his assessment.  Thank you!  Natalie Hastings---------------------  From: Torres Hill MD   To: [facility] Message Pool ([address]_[place]);     Sent: 6/26/2019 9:14:25 AM CDT  Subject: RE: FW: RESEND     Med check can be done through the portal---------------------  From: Cecile Ovalles MA ([facility] Message Pool ([address]224_[facility]))   To: NATALIE HASTINGS    Sent: 6/26/2019 9:19:10 AM CDT  Subject: FW: FW:  RESEND     Good MorningAnabela,    Dr. Hill received your message from last evening and you can follow up via portal to let us know how you are doing with the new medication.  If you have any concerns, please let us know.    Have a great day!!       Cecile BROWN MA/Dr. Hill

## 2022-02-15 NOTE — TELEPHONE ENCOUNTER
---------------------  From: Maryanne Maguire CMA (eRx Pool (32224_[facility]))   To: GTG Message Pool (32224_[place] - [place]);     Sent: 9/29/2020 3:38:33 PM CDT  Subject: FW: Prescription renewal request - Adderall     Medication Refill Approval Required    PCP:   [facility]    Medication:   Adderall 20mg  Last Filled:  9/1/20    Quantity:  60  Refills:  0    CSA on file?   no    Return to Clinic order placed?  Yes, due for med check 1/2021    Date of last office visit and reason:   9/15/20, annual physical    Date of last labs pertaining to condition:  none    Note:  Please advise on refill request from eRx pool      ---------------------  From: NATALIE HASTINGS  To: [facility]  Sent: 09/29/2020 02:02 p.m. CDT  Subject: Prescription renewal request  NATALIE HASTINGS is requesting renewal of the prescription(s) below and has submitted the following details:  Prescription(s) to be renewed  Medication: amphetamine-dextroamphetamine 20 mg oral tablet  Dose: 1 tab(s)  Frequency: 2 times a day  Rx date: 09/01/2020  Prescribed by: Mark Lopes PA-C  Reason for renewal:   Quantity:   Medication: azelastine 137 mcg/inh (0.1%) nasal spray  Dose: 2 spray(s)  Frequency: 2 times a day  Rx date: 09/01/2020  Prescribed by: Torres Hill MD  Reason for renewal:   Quantity:   Delivery option  Send to my pharmacy  [address]  [place] [address]  Contact Information  By Secure Message---------------------  From: Cecile Ovalles MA ([employer] Message Pool (32224_[facility]))   To: Torres Hill MD;     Sent: 9/29/2020 4:35:17 PM CDT  Subject: FW: Prescription renewal request - Adderall  ** Submitted: **  Order:fluticasone nasal (fluticasone 50 mcg/inh nasal spray)  2 spray(s)  Nasal  daily  Qty:  1 EA        Refills:  0          DEVIN     Route To Pharmacy - [facility] #[id number]    Signed by Torres Hill MD  9/29/2020 10:27:00 PM UT    ** Submitted:  **  Order:amphetamine-dextroamphetamine (amphetamine-dextroamphetamine 20 mg oral tablet)  1 tab(s)  Oral  bid  Qty:  60 tab(s)        Refills:  0          Substitutions Allowed     Route To Pharmacy - [facility] #[id number]    Signed by Torres Hill MD  9/29/2020 10:27:00 PM [facility]---------------------  From: Torres Hill MD   To: [employer] TEAM Pool (32224_[place] - [place]);     Sent: 9/29/2020 5:29:19 PM CDT  Subject: RE: Prescription renewal request - Adderall---------------------  From: Cecile Ovalles MA ([employer] TEAM Pool (32224_[facility]))   To: NATALIE HASTINGS    Sent: 9/29/2020 5:52:52 PM CDT  Subject: FW: Prescription renewal request - Adderall     Otf Hill received your message and did send in refills of your prescriptions, as well as the azelastine, to [facility].  If you have any further concerns, please let us know.    Sincerely,      Cecile BROWN CMA

## 2022-02-15 NOTE — TELEPHONE ENCOUNTER
---------------------  From: Noemy Cedillo RN   Sent: 6/5/2019 4:13:49 PM CDT  Subject: health screen     Time of Call:  [id number]  Return call at:1600     Person Calling:  patient  Phone number:      Note:   Patient needs biometric health screening for her employer. She has her physical scheduled for 6/20/19. I returned her call and left the message on her voice mail to bring her employers forms to this appointment to be filled out at this time. Labs can be done at her appointment as well. She indicated they need a non fasting BS and a Chol and HDL.

## 2022-02-15 NOTE — PROGRESS NOTES
"   Patient:   NATALIE HASTINGS            MRN: [id number]            FIN: [id number]               Age:   37 years     Sex:  Female     :  1980   Associated Diagnoses:   Obesity   Author:   Lucia Daniel      Visit Information   Visit type:  Medical Nutrition Therapy.    Referral source:  Torres Hill MD.       Chief Complaint   Overweight       Interval History   Pt is here today for follow up weight loss education.  Pt has lost 21.75 with gradual progress since initial RD visit on 17 starting weight of 208#.    Pt had been on Phentermine 13 starting weight os 205.8# and lost ~31# but regained it all.    Currently will be changing from Phentermine to Qsymia this week  Nutrition: Continues to improve nutritional quality of intake.  Rarely eating out and no longer buying pizza.  Eating fruit daily and vegetables 2x/ day.  Trying to prep meals in advance and take leftovers for work.  Using myfitnesspal to record intake.  Looking at labels and trying to stay within the portion recommended more mindful of hunger and fullness cues.      Exercise: improving now with the weather getting nice outside will take the dog for a walk 30 min q Sat and    sleep: fair   Stress: moderate, work, son, \"life\"       Health Status   Allergies:    Allergic Reactions (Selected)  Severity Not Documented  Penicillin (Fever.. and flenching)  Sulfa drug (Itching)   Medications:  (Selected)   Prescriptions  Prescribed  Chantix 1 mg oral tablet: See Instructions, Instructions: TAKE ONE TABLET BY MOUTH TWICE DAILY, # 60 tab(s), 1 Refill(s), Type: Soft Stop, Pharmacy: [facility] #[id number]  Qsymia 3.75 mg-23 mg oral capsule, extended release: 1 cap(s), po, qam, Instructions: 1 qd for 14 days  then increase to 2 qd, # 60 cap(s), 0 Refill(s), Type: Maintenance, Pharmacy: [facility] #[id number], 1 cap(s) po qam,Instr:1 qd for 14 days  then increase to 2 qd  azelastine 137 mcg/inh (0.1%) nasal spray: 2 spray(s), nasal, bid, # 1 EA, 3 " Refill(s), Type: Maintenance, Pharmacy: [facility] #[id number], 2 spray(s) nasal bid  fluticasone 50 mcg/inh nasal spray: 2 spray(s), nasal, daily, # 1 EA, 11 Refill(s), Type: Maintenance, Pharmacy: [facility] #[id number], 2 spray(s) nasal daily  Documented Medications  Documented  Zyrtec 10 mg oral tablet: 1 tab(s) ( 10 mg ), po, daily, 0 Refill(s), Type: Maintenance  multivitamin with minerals (w/ Iron): 0 Refill(s), Type: Maintenance   Problem list:    All Problems  Chronic rhinitis / SNOMED CT 255065614 / Confirmed  Obesity / SNOMED CT 2322129104 / Probable      Histories   Past Medical History:    No active or resolved past medical history items have been selected or recorded.   Family History:    Anxiety  Sister  Depression  Mother  Father  Sister     Procedure history:    No active procedure history items have been selected or recorded.   Social History:        Alcohol Assessment            Current, 3 times per week, 1 drinks/episode average.  2 drinks/episode maximum.      Tobacco Assessment            Current (some day smoker)        Physical Examination   VS/Measurements      Impression and Plan   Diagnosis     Obesity (JVK10-JR E66.09).       Ongoing motivational counseling for weight loss.    Pt will be starting Qsymia - went to website and downloaded discount card for pt.  Also encouraged pt to call around to find out cost of medication at different pharm, use good Rx  Use vegetable blend seasoning - no salt seasoning   Reviewed hunger/ fullness cues - doing other tasks besides eating.  Continue with mindful eating and continue keeping track of intake on an jo to become more aware of balance at meals and overall calorie intake.  When time allows use sparkpeople where menus, recipes and grocery shopping lists provided.  Provided additional meal and snack ideas.    Education of the following topis:  - Myplate, ~1250 calorie meal plan, portion sizes, label reading, bring quart size ziplock back with veggies 3x/  week, water 8-9 cups/ day!!! metamucil BID, MVI with 100% Vit D  - weight loss tips, mindful eating, motivational tips with reward system  - recording intake ([employer] pal)  - exercise recommendations FÉLIX - pt may like utube free videos, [employer] people videos   - ~70 gm protein/ day  - increase egg whites, legumes, salmon, tuna  - fruit 2+ x/ day  - 3 food groups/ meal   - wear tennis shoes for walking  - try meal replacement drink  - more WATER - bring drinking cup to work     Goals:   1.  Practice healthy stress management and get good quality sleep with the goal of 7-8 hours per night.    2.  Increase physical activity and make this a part of a daily routine.  Recommend 30 minutes of moderately intense physical activity 5 or more days per week.  Stay physically active on a daily basis and throughout the year.  Recommend a pedometer; gradually increase the average to a minimum of 6000 steps with the ultimate goal of 10,000 steps per day.    3.  Eat in a healthy way, per food plate method .  Keep a food record.  Eat 3 meals/ day.  A meal is three or more food groups; make it colorful for better nutrition.  Focus on portion control.  ~1250 calorie meal plan.  Follow weight loss tips and mindful eating.    4.  Goal weight revised - did not meet goal, 177 by 10/2018  5.  Read handouts provided.  Follow up in 2 months       Professional Services   Time spent with pt 30 min   cc Dr. Hill

## 2022-02-15 NOTE — TELEPHONE ENCOUNTER
---------------------  From: Fátima Christiansen (Appointment Pool (32224_[facility]))   To: NATALIE HASTINGS    Sent: 2019 1:09:19 PM CDT  Subject: RE: Appointment Request       645PM. Thank you.    ---------------------  From: NATALIE HASTINGS  To: [facility]  Sent: 2019 01:01 p.m. CDT  Subject: RE: Appointment Request  Otf Shine - When is the latest apt he has available that day?  ---------------------  From: Fátima Christiansen (Appointment Pool ([address]224_[facility]))  To: NATALIE HASTINGS  Sent: 2019 9:26:05 AM CDT  Subject: RE: Appointment Request       Dr. Hill only works afternoons on Thursday s, would that work for you.            ---------------------  From: NATALIE HASTINGS  To: [facility]  Sent: 2019 08:40 a.m. CDT  Subject: Appointment Request  Patient Name: NATALIE HASTINGS  Patient : 1980  Preferred Provider: Sergio  Appointment Dates: Between 2019 and 2019  Preferred Days: Any day  Preferred Time: Morning apt. Before 11 am?  Contact Patient by: Secure Message  Reason for Visit:  annual physical, pap smear, referral for attention assessment

## 2022-02-15 NOTE — TELEPHONE ENCOUNTER
---------------------  From: Danica Carbone LPN (Phone Messages Pool ([address] [place] [address]))   To: GTG Message Pool ([address] [place] [address]);     Sent: 7/10/2019 10:15:00 AM CDT  Subject: CONSUMER MESSAGE FW: Med Check           ---------------------  From: NATALIE HASTINGS  To: [facility]  Sent: 07/10/2019 10:12 a.m. CDT  Subject: Med Check  Good Morning Doc -    I m following up on the recent medication efficacy.  I haven t noticed much of a change; distractibility/inattentiveness/focus seems as it s always been. I set a simple  marker  for myself: to get thru the current book I ve been reading for months.. in the last couple weeks I tried to read it only once, then set it aside because the pages I read I had to re-read (the story wasn t sticking).    Perhaps a different medicine, or a different dose?  Or any other suggestion you d have?---------------------  From: Cecile Ovalles MA ([facility] Message Pool ([address] [place] [address]))   To: Torres Hill MD;     Sent: 7/10/2019 11:04:42 AM CDT  Subject: FW: CONSUMER MESSAGE FW: Med Check  ** Submitted: **  Order:methylphenidate (methylphenidate 36 mg/24 hr oral tablet, extended release)  1 tab(s)  Oral  qam  Qty:  14 tab(s)        Duration:  14 day(s)        Refills:  0          Substitutions Allowed     Route To Pharmacy - [facility] #[id number]    Signed by Torres Hill MD  7/10/2019 12:57:00 PM---------------------  From: Torres Hill MD   To: [facility]TG Message Pool (398[facility]);     Sent: 7/10/2019 12:59:18 PM CDT  Subject: RE: CONSUMER MESSAGE FW: Med Check     Medication increased to 36 mg---------------------  From: Josr NICHOLAS, Cecile ([employer] Message Pool ([address]))   To: NATALIE HASTINGS    Sent: 7/10/2019 1:23:23 PM CDT  Subject: FW: CONSUMER MESSAGE FW: Med Check     Good Afternoon, Dr. Sergio Peñaloza received your message.  He wants to try increasing the dose of the Methylphenidate to 36mg daily  to see how that works.  Hopefully this will help improve your symptoms.  Please let us know if you have any other concerns.    Sincerely,      Cecile BROWN MA/Dr. Hill

## 2022-02-15 NOTE — TELEPHONE ENCOUNTER
---------------------  From: Ad/Arely NICHOLAS ([employer] Messages Pool ([address] [place] [address]))   To: [employer] Pool ([address] [place] [address]);     Sent: 8/5/2021 1:12:54 PM CDT  Subject: FW: Medication Refill - Generic instead of alternative?           ---------------------  From: NATALIE HASTINGS  To: [facility]  Sent: 08/05/2021 01:03 p.m. CDT  Subject: Medication Refill - Generic instead of alternative?  Hello -  Would it be possible to have the Generic Chantix prescribed rather than the Wellbutrin alternate?  I ve taken wellbutrin in the past and would rather not start it again.    Please let me know at your earliest convenience.  (I understand there may be added fees/expenses)    Thank you.---------------------  From: Laurie Rodas LPN ([employer] Pool ([address] [place] [address]))   To: Torres Hill MD;     Sent: 8/5/2021 1:32:27 PM CDT  Subject: FW: Medication Refill - Generic instead of alternative?---------------------  From: Torres Hill MD   To: [employer] Pool ([address] [place] [address]);     Sent: 8/5/2021 3:45:05 PM CDT  Subject: RE: Medication Refill - Generic instead of alternative?     There is not a generic of Chantix at this time---------------------  From: Laurie Rodas LPN ([employer] Pool ([address] [place] [address]))   To: NATALIE HASTINGS    Sent: 8/5/2021 4:02:19 PM CDT  Subject: FW: Medication Refill - Generic instead of alternative?

## 2022-02-15 NOTE — PROGRESS NOTES
"   Patient:   NATALIE HASTINGS            MRN: [id number]            FIN: [id number]               Age:   37 years     Sex:  Female     :  1980   Associated Diagnoses:   Obesity   Author:   Lucia Daniel      Visit Information   Visit type:  Medical Nutrition Therapy.    Referral source:  Torres Hill MD.       Chief Complaint   Obesity       Interval History   Pt is here today for follow up weight loss education.  Pt has lost 18.25# with gradual progress since initial RD visit on 17 starting weight of 208#.    Pt had been on Phentermine 13 starting weight os 205.8# and lost ~31# but regained it all.    Current Phentermine dose is 15mg BID  Nutrition: Using myfitnesspal to record intake.  Pt is eating out less, purchasing more frozen vegetables for evening meals, raw vegetables for lunch.  AM is a portioned frozen breakfast burrito, noon leftovers.  Looking at labels and trying to stay within the portion recommended more mindful of hunger and fullness cues.      Exercise: decreased, knows this is one area to work on will take the dog for a walk 30 min q Sat and    sleep: fair   Stress: moderate, work, son, \"life\"       Health Status   Allergies:    Allergic Reactions (Selected)  Severity Not Documented  Penicillin (Fever.. and flenching)  Sulfa drug (Itching)   Medications:  (Selected)   Prescriptions  Prescribed  Chantix 1 mg oral tablet: 1 tab(s) ( 1 mg ), po, bid, # 60 tab(s), 2 Refill(s), Type: Maintenance, Pharmacy: [facility] #[id number], 1 tab(s) po bid  azelastine 137 mcg/inh (0.1%) nasal spray: 2 spray(s), nasal, bid, # 1 EA, 3 Refill(s), Type: Maintenance, Pharmacy: [facility] #[id number], 2 spray(s) nasal bid  fluticasone 50 mcg/inh nasal spray: 2 spray(s), nasal, daily, # 1 EA, 11 Refill(s), Type: Maintenance, Pharmacy: [facility] #[id number], 2 spray(s) nasal daily  phentermine 15 mg oral capsule: 1 cap(s) ( 15 mg ), PO, bid, # 60 cap(s), 0 Refill(s), Type: Maintenance, Pharmacy: [facility] #[id number], 1 cap(s) po bid,x30 day(s)  Documented Medications  Documented  Zyrtec 10 mg oral tablet: 1 tab(s) ( 10 mg ), po, daily, 0 Refill(s), Type: Maintenance  multivitamin with minerals (w/ Iron): 0 Refill(s), Type: Maintenance   Problem list:    All Problems  Chronic rhinitis / SNOMED CT 903093382 / Confirmed  Obesity / SNOMED CT 8162377580 / Probable      Histories   Past Medical History:    No active or resolved past medical history items have been selected or recorded.   Family History:    Anxiety  Sister  Depression  Mother  Father  Sister     Procedure history:    No active procedure history items have been selected or recorded.   Social History:        Alcohol Assessment            Current, 3 times per week, 1 drinks/episode average.  2 drinks/episode maximum.      Tobacco Assessment            Current (some day smoker)        Physical Examination   Measurements from flowsheet : Measurements   2/5/2018 9:14 AM CST Height Measured - Standard 67 in    Weight Measured - Standard 189.75 lb    BSA 2.02 m2    Body Mass Index 29.72 kg/m2  HI         Impression and Plan   Diagnosis     Obesity (RSV30-KV E66.09).       Ongoing motivational counseling for weight loss.  Discussed hunger/ fullness cues.  Continue with mindful eating and continue keeping track of intake on an jo to become more aware of balance at meals and overall calorie intake.  Discussed option to try sparkpeople where menus, recipes and grocery shopping lists provided.  Provided additional meal and snack ideas.    Education of the following topis:  - Myplate, ~1250 calorie meal plan, portion sizes, label reading, bring quart size ziplock back with veggies 3x/ week, water 8-9 cups/ day!!! metamucil BID, MVI with 100% Vit D  - weight loss tips, mindful eating, motivational tips with reward system  - recording intake ([facility])  - exercise recommendations FÉLIX - pt may like utube free videos   - 75 - 90 gm protein/ day  - increase egg  whites, legumes, salmon, tuna  - fruit 2+ x/ day  - 3 food groups/ meal   - wear tennis shoes for walking  - try meal replacement drink    Goals:   1.  Practice healthy stress management and get good quality sleep with the goal of 7-8 hours per night.    2.  Increase physical activity and make this a part of a daily routine.  Recommend 30 minutes of moderately intense physical activity 5 or more days per week.  Stay physically active on a daily basis and throughout the year.  Recommend a pedometer; gradually increase the average to a minimum of 6000 steps with the ultimate goal of 10,000 steps per day.    3.  Eat in a healthy way, per food plate method .  Keep a food record.  Eat 3 meals/ day.  A meal is three or more food groups; make it colorful for better nutrition.  Focus on portion control.  ~1250 calorie meal plan.  Follow weight loss tips and mindful eating.    4.  Goal weight revised - did not meet goal, 177 by 7/2018  5.  Read handouts provided.  Follow up in 2 months       Professional Services   Time spent with pt 30 min   cc Dr. Hill

## 2022-02-15 NOTE — NURSING NOTE
Comprehensive Intake Entered On:  1/9/2020 3:00 PM CST    Performed On:  1/9/2020 2:54 PM CST by Cecile Ovalles MA               Summary   Chief Complaint :   ADD f/u, refill meds.   Weight Measured :   187.2 lb(Converted to: 187 lb 3 oz, 84.91 kg)    Height Measured :   67 in(Converted to: 5 ft 7 in, 170.18 cm)    Body Mass Index :   29.32 kg/m2 (HI)    Body Surface Area :   2 m2   Systolic Blood Pressure :   112 mmHg   Diastolic Blood Pressure :   70 mmHg   Mean Arterial Pressure :   84 mmHg   Peripheral Pulse Rate :   96 bpm   BP Site :   Right arm   Pulse Site :   Radial artery   BP Method :   Manual   HR Method :   Manual   Temperature Tympanic :   98.4 DegF(Converted to: 36.9 DegC)    Cecile Ovalles MA - 1/9/2020 2:54 PM CST   Health Status   Allergies Verified? :   Yes   Medication History Verified? :   Yes   Medical History Verified? :   Yes   Pre-Visit Planning Status :   Completed   Tobacco Use? :   Current some day smoker   Cecile Ovalles MA - 1/9/2020 2:54 PM CST   Consents   Consent for Immunization Exchange :   Consent Granted   Consent for Immunizations to Providers :   Consent Granted   Cecile Ovalles MA - 1/9/2020 2:54 PM CST   Meds / Allergies   (As Of: 1/9/2020 3:00:12 PM CST)   Allergies (Active)   penicillin  Estimated Onset Date:   Unspecified ; Reactions:   Fever.., flenching ; Created By:   Cecile Mas; Reaction Status:   Active ; Category:   Drug ; Substance:   penicillin ; Type:   Allergy ; Updated By:   Cecile Mas; Reviewed Date:   6/20/2019 2:05 PM CDT      sulfa drug  Estimated Onset Date:   Unspecified ; Reactions:   Itching ; Created By:   Cecile Mas; Reaction Status:   Active ; Category:   Drug ; Substance:   sulfa drug ; Type:   Allergy ; Updated By:   Cecile Mas; Reviewed Date:   6/20/2019 2:05 PM CDT        Medication List   (As Of: 1/9/2020 3:00:12 PM CST)   Prescription/Discharge Order    amphetamine-dextroamphetamine  :   amphetamine-dextroamphetamine ;  Status:   Prescribed ; Ordered As Mnemonic:   amphetamine-dextroamphetamine 15 mg oral tablet ; Simple Display Line:   15 mg, 1 tab(s), Oral, bid, for 30 day(s), 60 tab(s), 0 Refill(s) ; Ordering Provider:   Torres Hill MD; Catalog Code:   amphetamine-dextroamphetamine ; Order Dt/Tm:   12/9/2019 3:51:55 PM CST          azelastine nasal  :   azelastine nasal ; Status:   Prescribed ; Ordered As Mnemonic:   azelastine 137 mcg/inh (0.1%) nasal spray ; Simple Display Line:   2 spray(s), Nasal, bid, 3 EA, 0 Refill(s) ; Ordering Provider:   Torres Hill MD; Catalog Code:   azelastine nasal ; Order Dt/Tm:   12/9/2019 3:49:35 PM CST          azelastine 137 mcg/inh (0.1%) nasal spray  :   azelastine 137 mcg/inh (0.1%) nasal spray ; Status:   Prescribed ; Ordered As Mnemonic:   azelastine 137 mcg/inh (0.1%) nasal spray ; Simple Display Line:   2 spray(s), Nasal, bid, 30 mL, 1 Refill(s) ; Ordering Provider:   Torres Hill MD; Catalog Code:   azelastine nasal ; Order Dt/Tm:   7/29/2019 5:03:36 PM CDT          fluticasone nasal  :   fluticasone nasal ; Status:   Prescribed ; Ordered As Mnemonic:   fluticasone 50 mcg/inh nasal spray ; Simple Display Line:   2 spray(s), Nasal, daily, 3 EA, 0 Refill(s) ; Ordering Provider:   Torres Hill MD; Catalog Code:   fluticasone nasal ; Order Dt/Tm:   12/9/2019 1:41:02 PM CST          varenicline  :   varenicline ; Status:   Prescribed ; Ordered As Mnemonic:   Chantix 1 mg oral tablet ; Simple Display Line:   1 mg, 1 tab(s), Oral, bid, 60 tab(s), 1 Refill(s) ; Ordering Provider:   Torres Hill MD; Catalog Code:   varenicline ; Order Dt/Tm:   11/26/2019 4:08:06 PM CST          Chantix 1 mg oral tablet  :   Chantix 1 mg oral tablet ; Status:   Prescribed ; Ordered As Mnemonic:   Chantix 1 mg oral tablet ; Simple Display Line:   1 tab(s), Oral, bid, 60 tab(s) ; Ordering Provider:   Torres Hill MD; Catalog Code:   varenicline ; Order Dt/Tm:   7/29/2019  5:03:36 PM CDT          Chantix 1 mg oral tablet  :   Chantix 1 mg oral tablet ; Status:   Prescribed ; Ordered As Mnemonic:   Chantix 1 mg oral tablet ; Simple Display Line:   1 tab(s), Oral, bid, 60 tab(s) ; Ordering Provider:   Torres Hill MD; Catalog Code:   varenicline ; Order Dt/Tm:   7/29/2019 5:03:36 PM CDT            Home Meds    cetirizine  :   cetirizine ; Status:   Documented ; Ordered As Mnemonic:   Zyrtec 10 mg oral tablet ; Simple Display Line:   10 mg, 1 tab(s), po, daily ; Catalog Code:   cetirizine ; Order Dt/Tm:   8/6/2013 2:46:04 PM CDT          multivitamin with minerals  :   multivitamin with minerals ; Status:   Documented ; Ordered As Mnemonic:   multivitamin with minerals (w/ Iron) ; Simple Display Line:   0 Refill(s) ; Catalog Code:   multivitamin with minerals ; Order Dt/Tm:   6/6/2017 8:27:56 AM CDT            Social History   Social History   (As Of: 1/9/2020 3:00:12 PM CST)   Alcohol:  Current      Beer (12 oz), Wine (5 oz), Liquor (Hard) (1.5 oz), 3-5 times per week, 1 drinks/episode average.  2 drinks/episode maximum.  Ready to change: No.   (Last Updated: 7/8/2019 3:01:09 PM CDT by Jenny Alfred)          Tobacco:  Current      Current (some day smoker)   (Last Updated: 1/20/2014 11:58:36 AM CST by Cecile Ovalles MA)   4 or less cigarettes(less than 1/4 pack)/day in last 30 days, Cigarettes, Total pack years: 20.  Ready to change: Yes.   (Last Updated: 7/8/2019 2:54:24 PM CDT by Jenny Alfred)          Substance Abuse:  Past      (Last Updated: 7/8/2019 2:54:30 PM CDT by Jenny Alrfed )         Employment/School:        Work/School description: .  Highest education level: 4 yr degree.   (Last Updated: 7/8/2019 3:02:16 PM CDT by Jenny Alfred)          Home/Environment:        Marital status: Single.  Living situation: Home/Independent.  Injuries/Abuse/Neglect in household: No.  Feels unsafe at home: No.  Family/Friends available for support: Yes.    (Last Updated: 7/8/2019 2:55:13 PM CDT by Jenny Alfred)          Nutrition/Health:        Type of diet: Regular.  Wants to lose weight: No.  Sleeping concerns: No.  Feels highly stressed: No.   (Last Updated: 7/8/2019 3:01:35 PM CDT by Jenny Alfred)          Exercise:  Occasional exercise      Exercise frequency: 3-4 times/week.  Exercise type: Aerobics.   (Last Updated: 7/8/2019 3:01:48 PM CDT by Jenny Alfred)          Sexual:        Sexually active: No.  Identifies as female, Sexual orientation: Straight or heterosexual.  History of STD: No.  Contraceptive Use Details: Intrauterine device.  History of sexual abuse: No.   (Last Updated: 7/8/2019 3:02:58 PM CDT by Jenny Alfred)          Other:        Regular menses.   (Last Updated: 7/8/2019 3:07:41 PM CDT by Jenny Alfred)

## 2022-02-15 NOTE — TELEPHONE ENCOUNTER
---------------------  From: Maura Piña RN (eRx Pool ([address]_[facility]))   To: [facility] Message Pool ([address]_[place]);     Sent: 7/29/2019 2:42:33 PM CDT  Subject: FW: Medication Management   Due Date/Time: 7/27/2019 5:59:00 PM CDT     Medication Refill Approval Required    PCP:   [facility]    Medication:   Azelastine HCl Nasal Solution  Last Filled:  2/6/19    Quantity:  1  Refills:  2    Medication:   Chantix  Last Filled:  1/3/19    Quantity:  60  Refills:  1    CSA on file?   _     Return to Clinic order placed?  Annual physical due 6/2020    Date of last office visit and reason:   6/20/19, annual physical    Note:  Please advise on refill request from eRx [employer]. RTC needed for recent med changed with Adderall?          ------------------------------------------  From: [facility] #[id number]  To: Torres Hill MD  Sent: July 26, 2019 5:59:58 PM CDT  Subject: Medication Management  Due: July 27, 2019 5:59:58 PM CDT    ** On Hold Pending Signature **  Drug: varenicline (Chantix 1 mg oral tablet)  TAKE ONE TABLET BY MOUTH TWICE DAILY   Quantity: 60 tab(s)     Days Supply: 30        Refills: 0  Substitutions Allowed  Notes from Pharmacy:     Dispensed Drug: varenicline (Chantix 1 mg oral tablet)  TAKE ONE TABLET BY MOUTH TWICE DAILY   Quantity: 60 tab(s)     Days Supply: 30        Refills: 0  Substitutions Allowed  Notes from Pharmacy:     ** On Hold Pending Signature **  Drug: azelastine nasal (azelastine 137 mcg/inh (0.1%) nasal spray)  Instill 2 sprays into the nose twice daily  Quantity: 30 EA       Days Supply: 30        Refills: 1  DEVIN  Notes from Pharmacy:     Dispensed Drug: azelastine nasal (azelastine 137 mcg/inh (0.1%) nasal spray)  Instill 2 sprays into the nose twice daily  Quantity: 30 mL       Days Supply: 30        Refills: 1  DEVIN  Notes from Pharmacy:     ** On Hold Pending Signature **  Drug: azelastine nasal (azelastine 137 mcg/inh (0.1%) nasal spray)  Instill 2 sprays into the nose twice  daily  Quantity: 30 EA       Days Supply: 30        Refills: 1  DEVIN  Notes from Pharmacy:     Dispensed Drug: azelastine nasal (azelastine 137 mcg/inh (0.1%) nasal spray)  Instill 2 sprays into the nose twice daily  Quantity: 30 mL       Days Supply: 30        Refills: 1  DEVIN  Notes from Pharmacy:     ** On Hold Pending Signature **  Drug: varenicline (Chantix 1 mg oral tablet)  TAKE ONE TABLET BY MOUTH TWICE DAILY   Quantity: 60 tab(s)     Days Supply: 30        Refills: 0  Substitutions Allowed  Notes from Pharmacy:     Dispensed Drug: varenicline (Chantix 1 mg oral tablet)  TAKE ONE TABLET BY MOUTH TWICE DAILY   Quantity: 60 tab(s)     Days Supply: 30        Refills: 0  Substitutions Allowed  Notes from Pharmacy:   ---------------------------------------------------------------  From: Cecile Ovalles MA ([employer]   To: Torres Hill MD;     Sent: 7/29/2019 2:43:35 PM CDT  Subject: FW: Medication Management   Due Date/Time: 7/27/2019 5:59:00 PM CDT---------------------  From: Torres Hill MD   To: [facility] #[id number]    Sent: 7/29/2019 5:03:39 PM CDT  Subject: FW: Medication Management     ** Submitted: **  Complete:varenicline (Chantix 1 mg oral tablet)   Signed by Torres Hill MD  7/29/2019 5:03:00 PM    ** Submitted: **  Complete:azelastine nasal (azelastine 137 mcg/inh (0.1%) nasal spray)   Signed by Torres Hill MD  7/29/2019 5:03:00 PM    ** Approved **  varenicline (Chantix Oral Tablet 1 MG)  TAKE ONE TABLET BY MOUTH TWICE DAILY  Qty:  60 tab(s)        Days Supply:  30        Refills:  0          Substitutions Allowed     Route To Pharmacy - [facility] #[id number]       ** Approved **  azelastine nasal (Azelastine HCl Nasal Solution 0.1 %)  Instill 2 sprays into the nose twice daily  Qty:  30 mL        Days Supply:  30        Refills:  1          DEVIN     Route To Pharmacy - [facility] #[id number]       ** Approved **  azelastine nasal (Azelastine HCl Nasal  Solution 0.1 %)  Instill 2 sprays into the nose twice daily  Qty:  30 mL        Days Supply:  30        Refills:  1          DEVIN     Route To [facility] #[id number]       ** Approved **  varenicline (Chantix Oral Tablet 1 MG)  TAKE ONE TABLET BY MOUTH TWICE DAILY  Qty:  60 tab(s)        Days Supply:  30        Refills:  0          Substitutions Allowed     Route To [facility] #[id number]

## 2022-02-15 NOTE — TELEPHONE ENCOUNTER
---------------------  From: Maura Piña RN (eRx Pool ([address]_[facility]))   To: [facility] Message Pool ([address]_[place]);     Sent: 9/29/2020 1:46:09 PM CDT  Subject: FW: Medication Management   Due Date/Time: 9/30/2020 2:00:00 AM CDT     Medication Refill Approval Required    PCP:   [facility]    Medication:   Chantix  Last Filled:  8/3/20    Quantity:  60  Refills:  1    CSA on file?   _     Return to Clinic order placed?  Yes, due for med check 1/2021    Date of last office visit and reason:   9/15/20, annual physical    Date of last labs pertaining to condition:  _    Note:  Please advise on refill request from eRx pool    ------------------------------------------  From: [facility] #[id number]  To: Torres Hill MD  Sent: September 29, 2020 2:00:44 AM CDT  Subject: Medication Management  Due: September 9, 2020 4:21:06 PM CDT     ** On Hold Pending Signature **     Drug: varenicline (Chantix 1 mg oral tablet), TAKE ONE TABLET BY MOUTH TWICE DAILY  Quantity: 60 tab(s)  Days Supply: 30  Refills: 0  Substitutions Allowed  Notes from Pharmacy:     Dispensed Drug: varenicline (Chantix 1 mg oral tablet), TAKE ONE TABLET BY MOUTH TWICE DAILY  Quantity: 60 tab(s)  Days Supply: 30  Refills: 0  Substitutions Allowed  Notes from Pharmacy:  ---------------------------------------------------------------  From: Cecile Ovalles MA ([facility] Message Pool ([address]_[facility]))   To: Torres Hill MD;     Sent: 9/29/2020 2:22:22 PM CDT  Subject: FW: Medication Management   Due Date/Time: 9/30/2020 2:00:00 AM CDT---------------------  From: Torres Hill MD   To: [facility] #[id number]    Sent: 9/29/2020 2:45:37 PM CDT  Subject: FW: Medication Management     ** Submitted: **  Complete:varenicline (Chantix 1 mg oral tablet)   Signed by Torres Hill MD  9/29/2020 7:45:00 PM [facility]    ** Approved with modifications: **  varenicline (Chantix Oral Tablet 1 MG)  TAKE ONE TABLET BY MOUTH TWICE DAILY  Qty:  60 tab(s)         Days Supply:  30        Refills:  0          Substitutions Allowed     Route To Pharmacy - [facility] #[id number]

## 2022-02-15 NOTE — TELEPHONE ENCOUNTER
---------------------  From: Fátima Christiansen (Appointment Pool ([address]_[facility]))   To: NATALIE HASTINGS    Sent: 2019 1:58:38 PM CDT  Subject: RE: Appointment Request       Dr. Hill comes in at 2PM.    ---------------------  From: NATALIE HASTINGS  To: [facility]  Sent: 2019 01:53 p.m. CDT  Subject: RE: Appointment Request  1pm please.  ---------------------  From: Fátima Christiansen (Appointment Pool ([address]_[facility]))  To: NATALIE HASTINGS  Sent: 2019 1:09:19 PM CDT  Subject: RE: Appointment Request            645PM. Thank you.       ---------------------  From: NATALIE HASTINGS  To: [facility]  Sent: 2019 01:01 p.m. CDT  Subject: RE: Appointment Request  Otf Shine - When is the latest apt he has available that day?  ---------------------  From: Fátima Christiansen (Appointment Pool ([facility]))  To: NATALIE HASTINGS  Sent: 2019 9:26:05 AM CDT  Subject: RE: Appointment Request       Dr. Hill only works afternoons on Thursday s, would that work for you.            ---------------------  From: NATALIE HASTINGS  To: [facility]  Sent: 2019 08:40 a.m. CDT  Subject: Appointment Request  Patient Name: NATALIE GUTHRIESCH  Patient : 1980  Preferred Provider: Sergio  Appointment Dates: Between 2019 and 2019  Preferred Days: Any day  Preferred Time: Morning apt. Before 11 am?  Contact Patient by: Secure Message  Reason for Visit:  annual physical, pap smear, referral for attention assessment

## 2022-02-15 NOTE — TELEPHONE ENCOUNTER
---------------------  From: Cecile Ovalles MA (eRx Pool ([address]_[facility]))   To: Advanced Practice Provider Gleason ([address]);     Sent: 9/1/2020 2:35:56 PM CDT  Subject: FW: Prescription renewal request     Please advise re: Adderall refill.  Pt has appt w/ [facility] 9/15/2020 but will run out before appt.      Entered by Cecile Ovalles MA on September 01, 2020 2:35:13 PM CDT    ** Submitted: **  Order:fluticasone nasal (fluticasone 50 mcg/inh nasal spray)  2 spray(s)  Nasal  daily  Qty:  1 EA        Refills:  0          DEVIN     Route To Pharmacy [facility] #[id number]    Signed by Cecile Ovalles MA  9/1/2020 7:34:00 PM UT    ** Submitted: **  Order:azelastine nasal (azelastine 137 mcg/inh (0.1%) nasal spray)  2 spray(s)  Nasal  bid  Qty:  1 EA        Refills:  0          DEVIN     Route To [facility] #[id number]    Signed by Cecile Ovalles MA  9/1/2020 7:34:00 PM UTC            ---------------------  From: NATALIE HASTINGS  To: [facility]  Sent: 09/01/2020 01:15 p.m. CDT  Subject: RE: Prescription renewal request  Otf Huber - Thank you for the message.  Please do refill now, as I only have a days  worth remaining (2 pills).    See you in a couple weeks!  ---------------------  From: Cecile Ovalles MA (eRx Pool ([address]_[facility]))  To: NATALIE HASTINGS  Sent: 9/1/2020 10:39:52 AM CDT  Subject: RE: Prescription renewal request       Otf Peñaloza,       We have you scheduled for an annual exam with Dr. Hill for 9/15/2020.  Will be needing refills prior to your appointment or is it okay to wait until then?  If you need refills now, I ll send the request to another provider since Dr. Hill is out this week.       Sincerely,       Cecile BROWN CMA            ---------------------  From: NATALIE HASTINGS  To: [facility]  Sent: 09/01/2020 08:20 a.m. CDT  Subject: Prescription renewal request  NATALIE HASTINGS is requesting renewal of the  prescription(s) below and has submitted the following details:  Prescription(s) to be renewed  Medication: amphetamine-dextroamphetamine 20 mg oral tablet  Dose: 1 tab(s)  Frequency: 2 times a day  Rx date: 08/03/2020  Prescribed by: Torres Hlil MD  Reason for renewal:  Quantity:  Medication: fluticasone 50 mcg/inh nasal spray  Dose: 2 spray(s)  Frequency: daily  Rx date: 12/09/2019  Prescribed by: Torres Hill MD  Reason for renewal:  Quantity:  Medication: azelastine 137 mcg/inh (0.1%) nasal spray  Dose: 2 spray(s)  Frequency: 2 times a day  Rx date: 07/29/2019  Prescribed by: Torres Hill MD  Reason for renewal:  Quantity:  Delivery option  Send to my pharmacy  [facility] [address]  [place] [phone]  Contact Information  By Secure Message  Comments  Annual physical & Follow up med review apts have been scheduled.---------------------  From: Mark Lopes PA-C (Advanced Practice Provider Pool ([address]))   To: eRx Pool ([facility]);     Sent: 9/1/2020 3:05:12 PM CDT  Subject: RE: Prescription renewal request     Adderall is refilled for a month---------------------  From: Cecile Ovalles MA (eRx Pool ([address]_[facility]))   To: NATALIE HASTINGS    Sent: 9/1/2020 4:26:01 PM CDT  Subject: FW: Prescription renewal request     Mark Stephens PA-C, sent in your Adderall as Dr. Hill is out of clinic this week.  Your nasal sprays were also sent in.  See you 9/15/2020.    Have a great afternoon/evening,      Cecile BROWN CMA

## 2022-02-15 NOTE — TELEPHONE ENCOUNTER
---------------------  From: Noemy Cedillo RN (Phone Messages Pool ([address]_[facility]))   To: NATALIE HASTINGS    Sent: 8/18/2020 8:25:19 AM CDT  Subject: Visit     Genesis Peñaloza,    Your next visit for a physical and medication review would be an in office visit. You are due now so you can call at any time to schedule this. Have a great day!    Noemy BAXTER RN

## 2022-02-15 NOTE — TELEPHONE ENCOUNTER
---------------------  From: Fátima Shelton CMA (eRx Pool ([address]_[facility]))   To: [employer] Message Pool ([address]_[place]);     Sent: 8/3/2020 8:05:58 AM CDT  Subject: FW: Prescription renewal request           ---------------------  From: NATALIE HASTINGS  [facility]  Sent: 07/31/2020 09:00 a.m. CDT  Subject: Prescription renewal request  NATALIE HASTINGS is requesting renewal of the prescription(s) below and has submitted the following details:  Prescription(s) to be renewed  Medication: amphetamine-dextroamphetamine 20 mg oral tablet  Dose: 1 tab(s)  Frequency: 2 times a day  Rx date: 07/02/2020  Prescribed by: Torres Hill MD  Reason for renewal:   Quantity:   Delivery option  Send to my pharmacy  [facility] [address]  [place] [address]  Contact Information  By Secure Message---------------------  From: Laurie Rodas LPN ([employer] Message Pool ([address]))   To: Torres Hill MD;     Sent: 8/3/2020 8:12:42 AM CDT  Subject: FW: Prescription renewal request

## 2022-02-15 NOTE — TELEPHONE ENCOUNTER
---------------------  From: Cecile Ovalles MA (eRx Pool ([address]))   To: Torres Hill MD;     Sent: 4/15/2021 9:41:54 AM CDT  Subject: FW: Prescription renewal request     LV:  2/17/2021 for ADHD f/u; RTC due June 2021      ---------------------  From: NATALIE HASTINGS  To: [facility]  Sent: 04/13/2021 04:45 p.m. CDT  Subject: Prescription renewal request  NATALIE HASTINGS is requesting renewal of the prescription(s) below and has submitted the following details:  Prescription(s) to be renewed  Medication: fluticasone 50 mcg/inh nasal spray  Dose: 2 spray(s)  Frequency: daily  Rx date: 01/20/2021  Prescribed by: Torres Hill MD  Reason for renewal:   Quantity:   Medication: amphetamine-dextroamphetamine 20 mg oral tablet  Dose: 1 tab(s)  Frequency: 2 times a day  Rx date: 03/16/2021  Prescribed by: Torres Hill MD  Reason for renewal:   Quantity:   Medication: azelastine 137 mcg/inh (0.1%) nasal spray  Dose: 2 spray(s)  Frequency: 2 times a day  Rx date: 01/20/2021  Prescribed by: Torres Hill MD  Reason for renewal:   Quantity:   Delivery option  [place] [address]  Contact Information  By Secure Message  ** Submitted: **  Order:amphetamine-dextroamphetamine (amphetamine-dextroamphetamine 20 mg oral tablet)  1 tab(s)  Oral  bid  Qty:  60 tab(s)        Refills:  0          Substitutions Allowed     Route To [facility] #[id number]    Signed by Torres Hill MD  4/15/2021 4:17:00 PM UT    ** Submitted: **  Order:fluticasone nasal (fluticasone 50 mcg/inh nasal spray)  2 spray(s)  Nasal  daily  Qty:  3 EA        Refills:  1          DEVIN     Route To [facility] #[id number]    Signed by Torres Hill MD  4/15/2021 4:17:00 PM UT    ** Submitted: **  Order:azelastine nasal (azelastine 137 mcg/inh (0.1%) nasal spray)  2 spray(s)  Nasal  bid  Qty:  3 EA        Refills:  1          DEVIN     Route To  Pharmacy - [facility] #[id number]    Signed by Torres Hill MD  4/15/2021 4:17:00 PM [facility]---------------------  From: Torres Hill MD   To: eRx Pool ([address]_[place]);     Sent: 4/15/2021 11:21:24 AM CDT  Subject: RE: Prescription renewal request

## 2022-02-15 NOTE — TELEPHONE ENCOUNTER
---------------------  From: NATALIE HASTINGS  To: [facility]  Sent: 05/15/2021 10:54 a.m. CDT  Subject: Medication Refill request: Please refill this as well  Medication: amphetamine-dextroamphetamine 20 mg oral tablet  Dose: 1 tab(s)  Frequency: 2 times a day  Rx date: 04/15/2021  Prescribed by: Torres Hill MD  Reason for renewal:  Quantity:Please advise, GTG return to clinic tomorrow.---------------------  From: Yi Franklin CMA (Phone Messages Pool ([address] [place] [address]))   To: [facility] Practice Provider Pool ([address] [place] [address]);     Sent: 5/17/2021 8:18:32 AM CDT  Subject: FW: Medication Refill request: Please refill this as well---------------------  From: Mark Lopes PA-C (Advanced Practice Provider Pool ([address] [place] [address]))   To: [employer] Pool ([address] [place] [address]);     Sent: 5/17/2021 8:27:15 AM CDT  Subject: RE: Medication Refill request: Please refill this as well     please send to [facility] to address and fill tomorrow---------------------  From: Arely Calhoun ([facility] Messages Pool ([facility]   To: [employer] Message Pool ([address] [place] [address]);     Sent: 5/17/2021 8:29:44 AM CDT  Subject: FW: Medication Refill request: Please refill this as well

## 2022-02-15 NOTE — PROGRESS NOTES
Chief Complaint    wt loss f/u, also refill Azelastine nasal spray  History of Present Illness      Patient is here for follow-up on obesity.  She has lost a couple pounds since her last visit.  She has also seen Grecia Daniel for nutrition counseling.  Adjustments were made in her eating plan.      She continues to use a lasting fluticasone nasal spray which helps her allergic rhinitis.  She is taking Chantix once a day to help with her smoking cessation.  Phentermine 15 mg has been helping with her appetite suppression.  She is comfortable with the current dose.  Review of Systems      See HPI.  All other review of systems negative.  Physical Exam   Vitals & Measurements    T: 97.8(Tympanic)  HR: 72(Peripheral)  BP: 94/60     HT: 67 in  WT: 198 lb  BMI: 31.01       Alert, oriented, no acute distress       Normal heart rate        Nonlabored breathing        Cooperative, appropriate affect  Assessment/Plan       Obesity         We will continue with phentermine 15 mg daily.  I have also refilled her nasal spray.  Continue with increasing her activity and monitor her caloric intake.  Will follow-up in 1 month       Orders:         azelastine-fluticasone nasal, 1 spray(s), nasal, bid, # 1 EA, 3 Refill(s), Type: Maintenance, Pharmacy: [id number], 1 spray(s) nasal bid         phentermine, 1 cap(s) ( 15 mg ), PO, qam, x 30 day(s), # 30 cap(s), 0 Refill(s), Type: Hard Stop, Pharmacy: [id number]         phentermine, 1 cap(s) ( 15 mg ), PO, qam, # 30 cap(s), 0 Refill(s), Type: Maintenance, Pharmacy: [id number], 1 cap(s) po qam,x30 day(s)  Patient Information     Name:NATALIE HASTINGS      Address:      [address] [place] [id number]     Sex:Female     YOB: 1980     [phone]     Emergency Contact:ANU HASTINGS     MRN:[id number]     FIN:[id number]     Location:[facility]     Date of Service:10/17/2017      Primary Care Physician:       Sergio  MD, Torres, [phone]  Problem List/Past Medical History    Ongoing     Obesity    Historical  Medications    azelastine-fluticasone 137 mcg-50 mcg/inh nasal spray, 1 spray(s), nasal, bid, 3 refills    Chantix 1 mg oral tablet, 1 mg= 1 tab(s), po, bid, 2 refills    fluticasone 50 mcg/inh nasal spray, 1 spray(s), nasal, daily    multivitamin with minerals (w/ Iron)    phentermine 15 mg oral capsule, 15 mg= 1 cap(s), po, qam    Zyrtec 10 mg oral tablet, 10 mg= 1 tab(s), po, daily  Allergies    penicillin (Fever.., flenching)    sulfa drug (Itching)  Social History    Smoking Status - 10/17/2017     Current some day smoker     Alcohol - 07/17/2017      Current, 3 times per week, 1 drinks/episode average. 2 drinks/episode maximum.     Tobacco - 07/17/2017      Current (some day smoker)  Family History    Anxiety: Sister.    Depression: Mother, Father and Sister.  Immunizations      Vaccine Date Status      typhoid, inactivated 02/02/2004 Recorded      Hep B 02/02/2004 Recorded      Hep A 02/02/2004 Recorded      Hep B 02/11/2002 Recorded      Hep B 11/16/2001 Recorded      Td 07/20/2000 Recorded      OPV 10/14/1982 Recorded      DTaP 10/14/1982 Recorded      MMR (measles/mumps/rubella) 08/12/1982 Recorded  Lab Results      Results (Last 90 days)      No results located.

## 2022-02-15 NOTE — PROGRESS NOTES
Chief Complaint    f/u wt loss program--currently on Phentermine.  History of Present Illness      Patient is here for follow-up on her medical weight loss treatment.  She has been on phentermine for the last month.  She has met with dietitian as well.  No side effects from medication.  Her weight is down 10 pounds in the last 5 weeks.  Review of Systems      Negative except for above  Physical Exam   Vitals & Measurements    T: 99.5(Tympanic)  HR: 84(Peripheral)  BP: 122/70     HT: 67 in  WT: 201.2 lb  BMI: 31.51       Alert, oriented, no acute distress      Normal heart rate      Nonlabored breathing       Cooperative, appropriate affect  Assessment/Plan       Obesity         She is a 10 pound weight loss in last 5 weeks.  We will continue with her current dose of phentermine follow-up in 1 month.       Orders:         phentermine, 1 cap(s) ( 15 mg ), PO, qam, # 30 cap(s), 0 Refill(s), Type: Maintenance, Pharmacy: [id number], 1 cap(s) po qam,x30 day(s)         phentermine, 1 cap(s) ( 15 mg ), PO, qam, x 30 day(s), # 30 cap(s), 0 Refill(s), Type: Hard Stop, Pharmacy: [id number]  Problem List/Past Medical History    Ongoing     Obesity    Historical  Medications    azelastine-fluticasone 137 mcg-50 mcg/inh nasal spray, 1 spray(s), nasal, bid    Chantix, po, bid    fluticasone 50 mcg/inh nasal spray, 1 spray(s), nasal, daily    multivitamin with minerals (w/ Iron)    phentermine 15 mg oral capsule, 15 mg= 1 cap(s), po, qam    Zyrtec 10 mg oral tablet, 10 mg= 1 tab(s), po, daily  Allergies    penicillin (Fever.., flenching)    sulfa drug (Itching)  Social History    Smoking Status - 08/17/2017     Light tobacco smoker     Alcohol - 07/17/2017      Current, 3 times per week, 1 drinks/episode average. 2 drinks/episode maximum.     Tobacco - 07/17/2017      Current (some day smoker)  Family History    Anxiety: Sister.    Depression: Mother, Father and Sister.  Immunizations      Vaccine Date Status       typhoid, inactivated 02/02/2004 Recorded      Hep B 02/02/2004 Recorded      Hep A 02/02/2004 Recorded      Hep B 02/11/2002 Recorded      Hep B 11/16/2001 Recorded      Td 07/20/2000 Recorded      OPV 10/14/1982 Recorded      DTaP 10/14/1982 Recorded      MMR (measles/mumps/rubella) 08/12/1982 Recorded  Lab Results      Results (Last 90 days)      No results located.

## 2022-02-15 NOTE — NURSING NOTE
Comprehensive Intake Entered On:  6/20/2019 2:07 PM CDT    Performed On:  6/20/2019 2:00 PM CDT by Laurie Rodas LPN               Summary   Chief Complaint :   Annual Physical, PAP   Weight Measured :   190 lb(Converted to: 190 lb 0 oz, 86.18 kg)    Height Measured :   67 in(Converted to: 5 ft 7 in, 170.18 cm)    Body Mass Index :   29.75 kg/m2 (HI)    Body Surface Area :   2.02 m2   Systolic Blood Pressure :   118 mmHg   Diastolic Blood Pressure :   74 mmHg   Mean Arterial Pressure :   89 mmHg   Peripheral Pulse Rate :   76 bpm   BP Site :   Right arm   Pulse Site :   Radial artery   BP Method :   Manual   HR Method :   Manual   Temperature Tympanic :   97.2 DegF(Converted to: 36.2 DegC)  (LOW)    Laurie Rodas LPN - 6/20/2019 2:00 PM CDT   Health Status   Allergies Verified? :   Yes   Medication History Verified? :   Yes   Pre-Visit Planning Status :   Completed   Laurie Rodas LPN - 6/20/2019 2:00 PM CDT   Consents   Consent for Immunization Exchange :   Consent Granted   Consent for Immunizations to Providers :   Consent Granted   Laurie Rodas LPN - 6/20/2019 2:00 PM CDT   Meds / Allergies   (As Of: 6/20/2019 2:07:34 PM CDT)   Allergies (Active)   penicillin  Estimated Onset Date:   Unspecified ; Reactions:   Fever.., flenching ; Created By:   Cecile Mas; Reaction Status:   Active ; Category:   Drug ; Substance:   penicillin ; Type:   Allergy ; Updated By:   Cecile Mas; Reviewed Date:   6/20/2019 2:05 PM CDT      sulfa drug  Estimated Onset Date:   Unspecified ; Reactions:   Itching ; Created By:   Cecile Mas; Reaction Status:   Active ; Category:   Drug ; Substance:   sulfa drug ; Type:   Allergy ; Updated By:   Cecile Mas; Reviewed Date:   6/20/2019 2:05 PM CDT        Medication List   (As Of: 6/20/2019 2:07:34 PM CDT)   Prescription/Discharge Order    phentermine-topiramate  :   phentermine-topiramate ; Status:   Processing ; Ordered As Mnemonic:   Qsymia 7.5 mg-46 mg  oral capsule, extended release ; Ordering Provider:   Torres Hill MD; Action Display:   Complete ; Catalog Code:   phentermine-topiramate ; Order Dt/Tm:   6/20/2019 2:05:19 PM          phentermine-topiramate  :   phentermine-topiramate ; Status:   Processing ; Ordered As Mnemonic:   Qsymia 3.75 mg-23 mg oral capsule, extended release ; Ordering Provider:   Torres Hill MD; Action Display:   Complete ; Catalog Code:   phentermine-topiramate ; Order Dt/Tm:   6/20/2019 2:05:19 PM          azelastine nasal  :   azelastine nasal ; Status:   Prescribed ; Ordered As Mnemonic:   azelastine 137 mcg/inh (0.1%) nasal spray ; Simple Display Line:   2 spray(s), Nasal, bid, 1 EA, 2 Refill(s) ; Ordering Provider:   Torres Hill MD; Catalog Code:   azelastine nasal ; Order Dt/Tm:   2/6/2019 10:59:26 AM          fluticasone nasal  :   fluticasone nasal ; Status:   Prescribed ; Ordered As Mnemonic:   fluticasone 50 mcg/inh nasal spray ; Simple Display Line:   2 spray(s), Nasal, daily, 16 gm, 3 Refill(s) ; Ordering Provider:   Torres Hill MD; Catalog Code:   fluticasone nasal ; Order Dt/Tm:   2/6/2019 10:58:26 AM          varenicline  :   varenicline ; Status:   Prescribed ; Ordered As Mnemonic:   Chantix 1 mg oral tablet ; Simple Display Line:   1 mg, 1 tab(s), Oral, bid, 60 tab(s), 1 Refill(s) ; Ordering Provider:   Torres Hill MD; Catalog Code:   varenicline ; Order Dt/Tm:   1/3/2019 1:57:30 PM            Home Meds    multivitamin with minerals  :   multivitamin with minerals ; Status:   Documented ; Ordered As Mnemonic:   multivitamin with minerals (w/ Iron) ; Simple Display Line:   0 Refill(s) ; Catalog Code:   multivitamin with minerals ; Order Dt/Tm:   6/6/2017 8:27:56 AM          cetirizine  :   cetirizine ; Status:   Documented ; Ordered As Mnemonic:   Zyrtec 10 mg oral tablet ; Simple Display Line:   10 mg, 1 tab(s), po, daily ; Catalog Code:   cetirizine ; Order Dt/Tm:   8/6/2013 2:46:04  PM

## 2022-02-15 NOTE — TELEPHONE ENCOUNTER
---------------------  From: Noemy Cedillo RN (Phone Messages Pool ([id number]_[facility]))   To: Torres Hill MD;     Sent: 8/8/2019 3:47:53 PM CDT  Subject: Consumer message FW: Johnsonch: Med Check update           ---------------------  From: NATALIE HASTINGS  To: [facility]  Sent: 08/08/2019 03:16 p.m. CDT  Subject: Globerisch: Med Check update  Good Afternoon Doc -    Checking in to report on the newest medication change.  If reading my book is a gauge on my ability to focus, then this is a game changer. Within the last 2 weeks I ve hunkered down with the story more than half the days, and some days(4?) I ve managed to [place] out a couple times a day. I start a day and set simple goals, and seem to accomplish them; chores are being completed!  No sleep troubles, or appetite suppression.  I did notice feeling impatient a little more, but not to a concerning degree & it seems to go away quickly/easily.

## 2022-02-15 NOTE — PROGRESS NOTES
Chief Complaint    f/u wt loss--on Phentermine.  History of Present Illness      Patient is here for follow-up on her weight loss management.  She has been on phentermine 15 mg twice daily without much success over the last month.  She continues to have trouble in the evening with snacking.  She has been exercising regularly.  Review of Systems      See HPI.  All other review of systems negative.  Physical Exam   Vitals & Measurements    T: 98.8(Tympanic)  HR: 96(Peripheral)  BP: 126/82     HT: 67 in  WT: 189.2 lb  BMI: 29.63       Alert, oriented, no acute distress      Normal heart rate      Nonlabored breathing  Assessment/Plan       Obesity         She will continue with her exercise and diet.  Change phentermine to long-acting 37.5 mg capsule daily.  Follow-up in 2 months       Orders:         phentermine, 1 cap(s) ( 37.5 mg ), PO, Daily, # 30 cap(s), 1 Refill(s), Type: Maintenance, Pharmacy: [facility][id number], 1 cap(s) po daily  Patient Information     Name:NATALIE HASTINGS      Address:      [address] [place] [id number]     Sex:Female     YOB: 1980     [phone]     Emergency Contact:ANU HASTINGS     MRN:[id number]     FIN:[id number]     Location:[facility]     Date of Service:02/22/2018      Primary Care Physician:       Torres Hill MD, [phone]  Problem List/Past Medical History    Ongoing     Chronic rhinitis     Obesity    Historical  Medications        azelastine 137 mcg/inh (0.1%) nasal spray: 2 spray(s), nasal, bid, 1 EA, 3 Refill(s).        Zyrtec 10 mg oral tablet: 10 mg, 1 tab(s), po, daily.        fluticasone 50 mcg/inh nasal spray: 2 spray(s), nasal, daily, 1 EA, 11 Refill(s).        multivitamin with minerals (w/ Iron): 0 Refill(s).        phentermine 37.5 mg oral capsule: 37.5 mg, 1 cap(s), PO, Daily, 30 cap(s), 1 Refill(s).        Chantix 1 mg oral tablet: 1 mg, 1 tab(s), po, bid, 60 tab(s), 2 Refill(s).                 Allergies    penicillin (Fever.., flenching)    sulfa drug (Itching)  Social History    Smoking Status - 02/22/2018     Current some day smoker     Alcohol - 07/17/2017      Current, 3 times per week, 1 drinks/episode average. 2 drinks/episode maximum.     Tobacco - 07/17/2017      Current (some day smoker)  Family History    Anxiety: Sister.    Depression: Mother, Father and Sister.  Immunizations      Vaccine Date Status      tetanus/diphth/pertuss (Tdap) adult/adol 08/08/2011 Recorded      typhoid, inactivated 02/02/2004 Recorded      Hep B 02/02/2004 Recorded      Hep A 02/02/2004 Recorded      Hep B 02/11/2002 Recorded      Hep B 11/16/2001 Recorded      Td 07/20/2000 Recorded      OPV 10/14/1982 Recorded      DTaP 10/14/1982 Recorded      MMR (measles/mumps/rubella) 08/12/1982 Recorded  Lab Results      Results (Last 90 days)      No results located.

## 2022-02-15 NOTE — PROGRESS NOTES
Chief Complaint    med check, refill Adderall.  History of Present Illness      Patent presents for attention deficit disorder follow up.  There have been no problems with the medication. The current dose is controlling symptoms. There are no other concerns.  Allergy symptoms are stable.      She is still smoking one cigarette daily, taking varenicline daily  Review of Systems          ROS reviewed and negative except for symptoms noted in HPI.  Physical Exam   Vitals & Measurements    T: 97.6  F (Tympanic)  HR: 94 (Peripheral)  BP: 142/80  SpO2: 98%     HT: 67 in  WT: 178.6 lb  BMI: 27.97             General:  Alert and oriented, No acute distress.             Eye: Normal conjunctiva.             HENT:  Oral mucosa is moist.             Neck:  Supple.             Respiratory:  Respirations are non-labored, clear           Cardiovascular:  Normal rate, regular rhythm           Musculoskeletal:  Normal gait.             Psychiatric:  Cooperative, Appropriate mood & affect, Normal judgment.  Assessment/Plan       1. ADD (attention deficit disorder) (F98.8)         Doing well, continue same dose        refill for 2 months        follow up in 4 months        PDMP queried, no concerns       2. Tobacco user (Z72.0)        Advised to stop varenicline over the next couple of weeks       3. Chronic rhinitis (J31.0)         controlled, continue current medication       Orders:         amphetamine-dextroamphetamine, = 1 tab(s) ( 20 mg ), Oral, bid, # 60 tab(s), 0 Refill(s), Type: Hard Stop, Pharmacy: [id number], 67, in, 09/15/20 11:09:00 CDT, Height Measured, 185.6, lb, 09/15/20 11:09:00 CDT, Weight Measured, (Completed)         amphetamine-dextroamphetamine, = 1 tab(s) ( 20 mg ), Oral, bid, # 60 tab(s), 0 Refill(s), Type: Maintenance, Pharmacy: [facility] #[id number], 1 tab(s) Oral bid, 67, in, 02/17/21 9:00:00 CST, Height Measured, 178.6, lb, 02/17/21 9:00:00 CST, Weight Measured, (Ordered)  Patient Information      Name:NATALIE HASTINGS      Address:      [address] [place] [id number]     Sex:Female     YOB: 1980     [phone]     Emergency Contact:ANU HASTINGS     MRN:[id number]     FIN:[id number]     Location:[facility]     Date of Service:02/17/2021      Primary Care Physician:       Torres Hill MD, [phone]      Attending Physician:       Torres Hill MD, [phone]  Problem List/Past Medical History    Ongoing     ADD (attention deficit disorder)     Chronic rhinitis     Obesity     Tobacco user    Historical     Pregnancy  Procedure/Surgical History     Date of last PAP test (06/20/2019)           Tonsillectomy and adenoidectomy        Medications    amphetamine-dextroamphetamine 20 mg oral tablet, 20 mg= 1 tab(s), Oral, bid    azelastine 137 mcg/inh (0.1%) nasal spray, 2 spray(s), Nasal, bid, 1 refills    azelastine 137 mcg/inh (0.1%) nasal spray, 2 spray(s), Nasal, bid, 1 refills    Chantix 1 mg oral tablet, 1 tab(s), Oral, bid, 1 refills    fluticasone 50 mcg/inh nasal spray, 2 spray(s), Nasal, daily, 1 refills    multivitamin with minerals (w/ Iron)    Zyrtec 10 mg oral tablet, 10 mg= 1 tab(s), Oral, daily  Allergies    penicillin (flenching, Fever..)    sulfa drug (Itching)  Social History    Smoking Status     Current every day smoker     Alcohol - Current      Beer (12 oz), Wine (5 oz), Liquor (Hard) (1.5 oz), 3-5 times per week, 1 drinks/episode average. 2 drinks/episode maximum. Ready to change: No.     Electronic Cigarette/Vaping      Electronic Cigarette Use: Never.     Employment/School      Work/School description: . Highest education level: 4 yr degree.     Exercise - Occasional exercise      Exercise frequency: 3-4 times/week. Exercise type: Aerobics.     Home/Environment      Marital status: Single. Lives with Children, Roomate(s)/Friend(s).     Nutrition/Health      Type of diet: Regular. Wants to lose  weight: No. Sleeping concerns: No. Feels highly stressed: No.     Other      Regular menses.     Sexual      Sexually active: No. Identifies as female, Sexual orientation: Straight or heterosexual. History of STD: No. Contraceptive Use Details: Intrauterine device. History of sexual abuse: No.     Substance Abuse - Past     Tobacco - Current      4 or less cigarettes(less than 1/4 pack)/day in last 30 days, Cigarettes, Total pack years: 20. Ready to change: Yes.  Family History    Anxiety: Sister.    Breast cancer: Aunt (M).    Depression: Mother, Father and Sister.    Heart disease: Father.    Hypercholesterolemia: Father.    Migraine: Sister.    Skin cancer: Father.  Immunizations      Vaccine Date Status          influenza virus vaccine, inactivated 09/15/2020 Given          Td 08/08/2011 Recorded          tetanus/diphth/pertuss (Tdap) adult/adol 08/08/2011 Recorded          Hep A 02/02/2004 Recorded          typhoid, inactivated 02/02/2004 Recorded          Hep B 02/02/2004 Recorded          Hep B 02/11/2002 Recorded          Hep B 11/16/2001 Recorded          Td 07/20/2000 Recorded          OPV 10/14/1982 Recorded          DTaP 10/14/1982 Recorded          MMR (measles/mumps/rubella) 08/12/1982 Recorded

## 2022-02-15 NOTE — PROGRESS NOTES
Chief Complaint    Annual Physical, PAP  History of Present Illness      Patient here for annual physical, would like fasting labs done today. Would like to have a referral for attention disorder. Son did some testing and she would like to have testing done. Notice that during a conversation mind would wonder. Past two months at work mind wonders more. Does not complete work before moving onto the next. When on phentermine was more alert and was able to complete projects.          Weight one year ago 180 lbs.  Today s weight 190 lbs.          Last Pap:  2013, Due          Menstrual cycles: Regular          Hx of abnormal paps:  No             Last Dental Exam:  UTD          Last Eye Exam: UTD          Immunizations:  UTD            Diet/Exercise: Biking/roller blading 3-4 times week.           Review of Systems          Constitutional:  No fever, No chills, No sweats, No weakness, No fatigue.            Eye:  No recent visual problem.            Ear/Nose/Mouth/Throat:  No decreased hearing, No nasal congestion, No sore throat.            Respiratory:  No shortness of breath, No cough.            Cardiovascular:  Negative, No chest pain, No palpitations, No peripheral edema.            Gastrointestinal:  No nausea, No vomiting, No diarrhea, No constipation, No heartburn.            Genitourinary:  No dysuria, No change in urine stream.            Hematology/Lymphatics:  No bruising tendency, No bleeding tendency.            Endocrine:  No cold intolerance, No heat intolerance.            Immunologic:  Negative.            Musculoskeletal:  No back pain, No neck pain, No joint pain, No muscle pain.            Integumentary:  No rash, No dryness, No skin lesion.            Neurologic:  Alert and oriented X4, No headache.                Psychiatric:  No anxiety, No depression  Physical Exam   Vitals & Measurements    T: 97.2   F (Tympanic)  HR: 76(Peripheral)  BP: 118/74     HT: 67 in  WT: 190 lb  BMI: 29.75        General: Alert and oriented, No acute distress.      Eye: Pupils are equal, round and reactive to light, Extraocular movements are intact, Normal conjunctiva.      HENT: Normocephalic, Tympanic membranes are clear, Oral mucosa is moist, No pharyngeal erythema, No sinus tenderness.      Neck: Supple, Non-tender, No carotid bruit, No lymphadenopathy, No thyromegaly.      Respiratory: Lungs are clear to auscultation, Respirations are non-labored, Breath sounds are equal, No chest wall tenderness.      Cardiovascular: Normal rate, Regular rhythm, No murmur, No gallop, Normal peripheral perfusion, No edema.      Breast: No mass, No tenderness, No discharge.      Gastrointestinal: Soft, Non-tender, Normal bowel sounds, No organomegaly.      Genitourinary: No costovertebral angle tenderness.      Labia: Within normal limits.      Urethra: Within normal limits.      Vagina: Within normal limits.      Ovaries: Within normal limits.      Adnexa: no masses or tenderness.      Musculoskeletal: Normal range of motion, Normal strength, No swelling, No deformity.      Integumentary: Warm, Dry, No rash.      Neurologic: Alert, Oriented, Normal sensory, Normal motor function, No focal deficits, Normal deep tendon reflexes.      Psychiatric: Cooperative, Appropriate mood & affect.         Assessment/Plan       1. Chronic rhinitis (J31.0)         Stable, continue on current medication.       2. Physical exam, annual (Z00.00)         Continue with diet and exercise. Labs done today, will notify patient when results are available.       3. ADD (attention deficit disorder) (F98.8)         Contact clinic with information on where son went to get ADD testing done, and what medication he is on. Can place a referral if needed.        Plan on using same medication that son takes and have close follow up regarding effectiveness.      I, Laurie Rodas LPN, acted solely as a scribe for, and in the presence of Dr. Torres Hill who  performed the services.  Patient Information     Name:NATALIE HASTINGS      Address:      [address] [place] [id number]     Sex:Female     YOB: 1980     [phone]     Emergency Contact:ANU HASTINGS     MRN:[id number]     FIN:[id number]     Location:[facility]     Date of Service:06/20/2019      Primary Care Physician:       Torres Hill MD, [phone]      Attending Physician:       Torres Hill MD, [phone]  Problem List/Past Medical History    Ongoing     Chronic rhinitis     Obesity    Historical     No qualifying data  Medications     Zyrtec 10 mg oral tablet: 10 mg, 1 tab(s), po, daily.     multivitamin with minerals (w/ Iron): 0 Refill(s).     Chantix 1 mg oral tablet: 1 mg, 1 tab(s), Oral, bid, 60 tab(s), 1 Refill(s).     fluticasone 50 mcg/inh nasal spray: 2 spray(s), Nasal, daily, 16 gm, 3 Refill(s).     azelastine 137 mcg/inh (0.1%) nasal spray: 2 spray(s), Nasal, bid, 1 EA, 2 Refill(s).      Allergies    penicillin (flenching, Fever..)    sulfa drug (Itching)  Social History    Smoking Status - 05/03/2018     Current some day smoker     Alcohol      Current, 3 times per week, 1 drinks/episode average. 2 drinks/episode maximum., 07/17/2017     Tobacco      Current (some day smoker), 01/20/2014  Family History    Anxiety: Sister.    Depression: Mother, Father and Sister.  Immunizations      Vaccine Date Status      tetanus/diphth/pertuss (Tdap) adult/adol 08/08/2011 Recorded      typhoid, inactivated 02/02/2004 Recorded      Hep B 02/02/2004 Recorded      Hep A 02/02/2004 Recorded      Hep B 02/11/2002 Recorded      Hep B 11/16/2001 Recorded      Td 07/20/2000 Recorded      OPV 10/14/1982 Recorded      DTaP 10/14/1982 Recorded      MMR (measles/mumps/rubella) 08/12/1982 Recorded

## 2022-02-15 NOTE — LETTER
(Inserted Image. Unable to display)   June 16, 2021  NATALIE HASTINGS  [address] [place] [address]        Dear NATALIE,    Thank you for selecting [facility] for your healthcare needs.    Our records indicate you are due for the following services:     Follow-up office visit      (FYI   Regarding office visits: In some instances, a video visit or telephone visit may be offered as an option.)    To schedule an appointment or if you have further questions, please contact your clinic at [phone].    Powered by [facility]    Sincerely,    Torres Hill MD

## 2022-02-15 NOTE — LETTER
(Inserted Image. Unable to display)         February 27, 2020        NATALIE HASTINGS  [address] [place], [place] [id number]        Dear NATALIE,    Thank you for selecting [facility] for your healthcare needs.    Our records indicate you are due for the following services:     Medication Check      To schedule an appointment or if you have further questions, please contact your primary clinic:   [facility]       [phone]   [facility]       [phone]              [facility]     [phone]      Powered by [email or web address]    Sincerely,    Torres Hill MD

## 2022-02-15 NOTE — TELEPHONE ENCOUNTER
---------------------  From: Torres Hill MD   To: NATALIE HASTINGS    Sent: 6/25/2019 5:44:29 PM CDT  Subject: Patient Message - Results Notification        Your Pap smear was normal.  Repeat in five years.

## 2022-02-15 NOTE — PROGRESS NOTES
Patient:   NATALIE HASTINGS            MRN: [id number]            FIN: [id number]               Age:   36 years     Sex:  Female     :  1980   Associated Diagnoses:   Obesity   Author:   Lucia Daniel      Visit Information   Visit type:  Medical Nutrition Therapy.    Referral source:  Torres Hill MD.       Chief Complaint   Obesity       Interval History   Pt is here today for weight loss education.  Pt had been on Phentermine 13 starting weight os 205.8# and lost ~31# and since has regained all of her weight back.  Nutrition: had learned and has continued with some of her great habits - drinking a quart of water before getting out of bed and drinking a couple quarts of water throughout the day, am is HB egg and a V8, 11:30 will have lunch of leftovers or a sandwich with fruit and cottage cheese, afternoon snack (variety of options from a can of baked beans to ramen noodles, soup; evening meal is typically protein and vegetable; HS is where pt's diet falls apart and will eat snacks and treats.  Pt does not weigh herself and does not track anything  Exercise: walks at work with coworkers   sleep: questionable, decreased energy   Stress: moderate       Health Status   Allergies:    Allergic Reactions (Selected)  Severity Not Documented  Penicillin (Fever.. and flenching)  Sulfa drug (Itching)   Medications:  (Selected)   Prescriptions  Prescribed  phentermine 15 mg oral capsule: 1 cap(s) ( 15 mg ), PO, bid, # 60 cap(s), 0 Refill(s), Type: Maintenance  Documented Medications  Documented  Chantix: po, bid, 0 Refill(s), Type: Maintenance  Zyrtec 10 mg oral tablet: 1 tab(s) ( 10 mg ), po, daily, 0 Refill(s), Type: Maintenance  azelastine-fluticasone 137 mcg-50 mcg/inh nasal spray: 1 spray(s), nasal, bid, 0 Refill(s), Type: Maintenance  fluticasone 50 mcg/inh nasal spray: 1 spray(s), nasal, daily, 0 Refill(s), Type: Maintenance  multivitamin with minerals (w/ Iron): 0 Refill(s), Type: Maintenance    Problem list:    All Problems  Obesity / SNOMED CT [id number] / Probable      Histories   Past Medical History:    No active or resolved past medical history items have been selected or recorded.   Family History:    Anxiety  Sister  Depression  Mother  Father  Sister     Procedure history:    No active procedure history items have been selected or recorded.   Social History:        Tobacco Assessment            Current (some day smoker)        Impression and Plan   Diagnosis     Obesity (DJS90-BL E66.09).       Today patient was instructed on lifestlyle interventions to help reduce the risks associated with being obese.  Pt is provided with motivational counseling and large focus on mindful eating.  Pt is also given meal and snack ideas and discussed ways to improve quality of intake.    Education of the following topis:  - Myplate, ~1250 calorie meal plan, portion sizes, label reading, bring quart size ziplock back with veggies 3x/ week, metamucil BID  - weight loss tips, mindful eating, motivational tips with reward system  - recording intake (myfitness pal)  - exercise recommendations FÉLIX - pt may like   - potential for weight loss medications - discussed options     Goals:   1.  Practice healthy stress management and get good quality sleep with the goal of 7-8 hours per night.    2.  Increase physical activity and make this a part of a daily routine.  Recommend 30 minutes of moderately intense physical activity 5 or more days per week.  Stay physically active on a daily basis and throughout the year.  Recommend a pedometer; gradually increase the average to a minimum of 6000 steps with the ultimate goal of 10,000 steps per day.    3.  Eat in a healthy way, per food plate method .  Keep a food record.  Eat 3 meals/ day.  A meal is three or more food groups; make it colorful for better nutrition.  Focus on portion control.  ~1250 calorie meal plan.  Follow weight loss tips and mindful eating.    4.  Goal  weight 188 by 12/2017  5.  Read handouts provided.  Follow up in 2 months       Professional Services   Time spent with pt 45 min   cc Dr. Hill

## 2022-02-28 ENCOUNTER — MYC MEDICAL ADVICE (OUTPATIENT)
Dept: FAMILY MEDICINE | Facility: CLINIC | Age: 42
End: 2022-02-28
Payer: COMMERCIAL

## 2022-02-28 DIAGNOSIS — Z72.0 TOBACCO ABUSE: ICD-10-CM

## 2022-02-28 DIAGNOSIS — F98.8 ATTENTION DEFICIT DISORDER, UNSPECIFIED HYPERACTIVITY PRESENCE: Primary | ICD-10-CM

## 2022-02-28 RX ORDER — VARENICLINE TARTRATE 1 MG/1
1 TABLET, FILM COATED ORAL 2 TIMES DAILY
Qty: 60 TABLET | Refills: 0 | Status: SHIPPED | OUTPATIENT
Start: 2022-02-28 | End: 2022-03-31

## 2022-02-28 RX ORDER — DEXTROAMPHETAMINE SACCHARATE, AMPHETAMINE ASPARTATE, DEXTROAMPHETAMINE SULFATE AND AMPHETAMINE SULFATE 5; 5; 5; 5 MG/1; MG/1; MG/1; MG/1
20 TABLET ORAL 2 TIMES DAILY
Qty: 60 TABLET | Refills: 0 | Status: SHIPPED | OUTPATIENT
Start: 2022-02-28 | End: 2022-03-24

## 2022-02-28 NOTE — TELEPHONE ENCOUNTER
Routing refill request to provider for review/approval because:  Drug not on the [facility] refill protocol (Adderall)  Chantix- unclear on patient current tobacco status.    Last Written Prescription Date:  2/1/22  Last Fill Quantity: 60,  # refills: 0   Last office visit: 10/21/21 prescribing provider:  BRI  Future Office Visit:   Next 5 appointments (look out 90 days)    Mar 28, 2022  1:00 PM  (Arrive by 12:40 PM)  Provider Visit with Torres Hill MD  [facility] ([facility] ) [address] [place] [phone]                 Colette BERGER

## 2022-03-01 ENCOUNTER — MYC REFILL (OUTPATIENT)
Dept: FAMILY MEDICINE | Facility: CLINIC | Age: 42
End: 2022-03-01
Payer: COMMERCIAL

## 2022-03-01 DIAGNOSIS — F98.8 ATTENTION DEFICIT DISORDER, UNSPECIFIED HYPERACTIVITY PRESENCE: ICD-10-CM

## 2022-03-01 DIAGNOSIS — F17.200 NICOTINE DEPENDENCE: ICD-10-CM

## 2022-03-01 RX ORDER — VARENICLINE TARTRATE 1 MG/1
1 TABLET, FILM COATED ORAL 2 TIMES DAILY WITH MEALS
Qty: 56 TABLET | Refills: 0 | Status: CANCELLED | OUTPATIENT
Start: 2022-03-01

## 2022-03-01 RX ORDER — VARENICLINE TARTRATE 1 MG/1
1 TABLET, FILM COATED ORAL 2 TIMES DAILY
COMMUNITY
Start: 2021-07-08 | End: 2022-03-24

## 2022-03-02 ENCOUNTER — TELEPHONE (OUTPATIENT)
Dept: FAMILY MEDICINE | Facility: CLINIC | Age: 42
End: 2022-03-02
Payer: COMMERCIAL

## 2022-03-02 DIAGNOSIS — F90.0 ATTENTION DEFICIT HYPERACTIVITY DISORDER (ADHD), PREDOMINANTLY INATTENTIVE TYPE: Primary | ICD-10-CM

## 2022-03-02 RX ORDER — DEXTROAMPHETAMINE SACCHARATE, AMPHETAMINE ASPARTATE, DEXTROAMPHETAMINE SULFATE AND AMPHETAMINE SULFATE 5; 5; 5; 5 MG/1; MG/1; MG/1; MG/1
20 TABLET ORAL 2 TIMES DAILY
COMMUNITY
Start: 2021-08-12 | End: 2022-03-02

## 2022-03-02 RX ORDER — DEXTROAMPHETAMINE SACCHARATE, AMPHETAMINE ASPARTATE, DEXTROAMPHETAMINE SULFATE AND AMPHETAMINE SULFATE 5; 5; 5; 5 MG/1; MG/1; MG/1; MG/1
20 TABLET ORAL 2 TIMES DAILY
Qty: 60 TABLET | Refills: 0 | Status: SHIPPED | OUTPATIENT
Start: 2022-03-02 | End: 2022-10-26

## 2022-03-02 NOTE — LETTER
(Inserted Image. Unable to display)   February 25, 2022  NATALIE HASTINGS  [address] [place] [address]        Dear NATALIE,    Thank you for selecting [facility] for your healthcare needs.    Our records indicate you are due for the following services:     Follow-up office visit      (FYI   Regarding office visits: In some instances, a video visit or telephone visit may be offered as an option.)    To schedule an appointment or if you have further questions, please contact your clinic at [phone].    Powered by [employer]    Sincerely,    Torres Hill MD

## 2022-03-02 NOTE — TELEPHONE ENCOUNTER
Routing refill request to provider for review/approval because:  Refill was declined 3/1/22 because pt is past due for f/u visit.    Last visit: 10/21/21  Last fill: 2/1/22 60 tabs, 0 refills    Maura Piña RN

## 2022-03-02 NOTE — TELEPHONE ENCOUNTER
---------------------  From: Fátima Shelton CMA (eRx Pool ([id number]_[facility]))   To: GTG Message Pool ([id number]);     Sent: 2/1/2022 4:32:06 PM CST  Subject: FW: Prescription renewal request     Due this month for med check/120 day CSA agreement. LOV 10/21/21      ---------------------  From: NATALIE HASTINGS  To: [facility]  Sent: 01/31/2022 07:52 a.m. CST  Subject: Prescription renewal request  NATALIE HASTINGS is requesting renewal of the prescription(s) below and has submitted the following details:  Prescription(s) to be renewed  Medication: amphetamine-dextroamphetamine 20 mg oral tablet  Dose: 1 tab(s)  Frequency: 2 times a day  Rx date: 01/03/2022  Prescribed by: Torres Hill MD  Reason for renewal:   Quantity:   Delivery option  Send to my pharmacy  [facility]  [address] [place] [address]  Contact Information  By Secure Message---------------------  From: Cecile Ovalles MA (GTG Message Pool ([id number]))   To: Torres Hill MD;     Sent: 2/1/2022 4:33:25 PM CST  Subject: FW: Prescription renewal request  ** Submitted: **  Order:amphetamine-dextroamphetamine (amphetamine-dextroamphetamine 20 mg oral tablet)  1 tab(s)  Oral  bid  Qty:  60 tab(s)        Refills:  0          Substitutions Allowed     Route To Pharmacy - [id number]    Signed by Torres Hill MD  2/2/2022 12:12:00 AM [facility]---------------------  From: Torres Hill MD   To: GTG Message Pool ([id number]_[place] [facility] [place]);     Sent: 2/1/2022 6:12:42 PM CST  Subject: RE: Prescription renewal request---------------------  From: Cecile Ovalles MA (GTG Message Pool ([id number]_[facility]))   To: NATALIE HASTINGS    Sent: 2/1/2022 6:15:21 PM CST  Subject: FW: Prescription renewal request     Good eveningNatalie Dr. Goblirsch received your message and sent in a refill to [facility] pharmacy for your Adderall.  You will be due for a visit for your next set of  refills.    Sincerely,     Cecile BROWN CMA

## 2022-03-02 NOTE — TELEPHONE ENCOUNTER
---------------------  From: Maura Piña RN (eRx Pool ([id number]_[facility]))   To: [employer] Message Pool ([id number]);     Sent: 12/31/2021 4:26:36 PM CST  Subject: FW: Prescription renewal request     Due for ADHD f/u Feb. 2022 per RTC in chart.  Last filled 12/2/2021 for 60 tabs, 0 refills  Please advise.      ---------------------  From: NATALIE HASTINGS  To: [facility]  Sent: 12/30/2021 02:44 p.m. CST  Subject: Prescription renewal request  NATALIE HASTINGS is requesting renewal of the prescription(s) below and has submitted the following details:  Prescription(s) to be renewed  Medication: amphetamine-dextroamphetamine 20 mg oral tablet  Dose: 1 tab(s)  Frequency: 2 times a day  Rx date: 12/02/2021  Prescribed by: Torres Hill MD  Reason for renewal:   Quantity:   Delivery option  Send to my pharmacy  [facility]  [address] [place] [address]  Contact Information  By Secure Message---------------------  From: Laurie Rodas LPN ([employer] Pool (32224[facility]))   To: Torres Hill MD;     Sent: 1/3/2022 7:18:37 AM CST  Subject: FW: Prescription renewal request  ** Submitted: **  Order:amphetamine-dextroamphetamine (amphetamine-dextroamphetamine 20 mg oral tablet)  1 tab(s)  Oral  bid  Qty:  60 tab(s)        Refills:  0          Substitutions Allowed     Route To Pharmacy - [facility][id number]    Signed by Torres Hill MD  1/3/2022 9:12:00 PM [facility]---------------------  From: Torres Hill MD   To: GTG Message Pool ([id number]_[place] [facility] [place]);     Sent: 1/3/2022 3:13:49 PM CST  Subject: RE: Prescription renewal requestLVM, Notified patient that medication was sent to the pharmacy

## 2022-03-02 NOTE — TELEPHONE ENCOUNTER
Reason for Call:  medication refill:    Do you use a [facility]?  Name of the pharmacy and phone number for the current request:  [facility] off of [facility]    Name of the medication requested: Chantex and Adderall    Other request: Patient called and stated the pharmacy called needing a new prescription sent over. Patient does have an appointment scheduled for 3.28.22 with [facility]. Patient states she is out and is needing a refill sent.     Can we leave a detailed message on this number? YES    Phone number patient can be reached at: Cell number on file:    Telephone Information:   Mobile [phone]       Best Time: Can call anytime    Call taken on 3/2/2022 at 12:20 PM by Regina Simpson

## 2022-03-03 RX ORDER — VARENICLINE TARTRATE 1 MG/1
1 TABLET, FILM COATED ORAL 2 TIMES DAILY
Qty: 56 TABLET | Refills: 0 | OUTPATIENT
Start: 2022-03-03

## 2022-03-03 RX ORDER — DEXTROAMPHETAMINE SACCHARATE, AMPHETAMINE ASPARTATE, DEXTROAMPHETAMINE SULFATE AND AMPHETAMINE SULFATE 5; 5; 5; 5 MG/1; MG/1; MG/1; MG/1
20 TABLET ORAL 2 TIMES DAILY
Qty: 60 TABLET | Refills: 0 | OUTPATIENT
Start: 2022-03-03

## 2022-03-03 NOTE — TELEPHONE ENCOUNTER
Denied Rxs, request already responded to by other means.     Italia Gann RN, BSN Nurse Triage Advisor 9:53 AM 3/3/2022

## 2022-03-28 ENCOUNTER — OFFICE VISIT (OUTPATIENT)
Dept: FAMILY MEDICINE | Facility: CLINIC | Age: 42
End: 2022-03-28
Payer: COMMERCIAL

## 2022-03-28 VITALS
OXYGEN SATURATION: 99 % | HEIGHT: 67 IN | HEART RATE: 92 BPM | TEMPERATURE: 97.6 F | WEIGHT: 182.4 LBS | DIASTOLIC BLOOD PRESSURE: 70 MMHG | SYSTOLIC BLOOD PRESSURE: 122 MMHG | BODY MASS INDEX: 28.63 KG/M2

## 2022-03-28 DIAGNOSIS — F90.0 ATTENTION-DEFICIT HYPERACTIVITY DISORDER, PREDOMINANTLY INATTENTIVE TYPE: Primary | ICD-10-CM

## 2022-03-28 PROCEDURE — 99213 OFFICE O/P EST LOW 20 MIN: CPT | Performed by: FAMILY MEDICINE

## 2022-03-28 RX ORDER — DEXTROAMPHETAMINE SACCHARATE, AMPHETAMINE ASPARTATE, DEXTROAMPHETAMINE SULFATE AND AMPHETAMINE SULFATE 5; 5; 5; 5 MG/1; MG/1; MG/1; MG/1
20 TABLET ORAL 2 TIMES DAILY
Qty: 60 TABLET | Refills: 0 | Status: SHIPPED | OUTPATIENT
Start: 2022-05-29 | End: 2023-03-28

## 2022-03-28 RX ORDER — DEXTROAMPHETAMINE SACCHARATE, AMPHETAMINE ASPARTATE, DEXTROAMPHETAMINE SULFATE AND AMPHETAMINE SULFATE 5; 5; 5; 5 MG/1; MG/1; MG/1; MG/1
20 TABLET ORAL 2 TIMES DAILY
Qty: 60 TABLET | Refills: 0 | Status: SHIPPED | OUTPATIENT
Start: 2022-03-28 | End: 2022-09-26

## 2022-03-28 RX ORDER — DEXTROAMPHETAMINE SACCHARATE, AMPHETAMINE ASPARTATE, DEXTROAMPHETAMINE SULFATE AND AMPHETAMINE SULFATE 5; 5; 5; 5 MG/1; MG/1; MG/1; MG/1
20 TABLET ORAL 2 TIMES DAILY
Qty: 60 TABLET | Refills: 0 | Status: SHIPPED | OUTPATIENT
Start: 2022-04-28 | End: 2022-11-08

## 2022-03-28 NOTE — PROGRESS NOTES
"  Assessment & Plan     Attention-deficit hyperactivity disorder, predominantly inattentive type  Doing well, continue current medications  Doing well, continue same dose     Refill for 3 months     Follow up in 3 months     PDMP queried, no concerns   - amphetamine-dextroamphetamine (ADDERALL) 20 MG tablet; Take 1 tablet (20 mg) by mouth 2 times daily  - amphetamine-dextroamphetamine (ADDERALL) 20 MG tablet; Take 1 tablet (20 mg) by mouth 2 times daily  - amphetamine-dextroamphetamine (ADDERALL) 20 MG tablet; Take 1 tablet (20 mg) by mouth 2 times daily     BMI:   Estimated body mass index is 28.57 kg/m  as calculated from the following:    Height as of this encounter: 1.702 m (5' 7\").    Weight as of this encounter: 82.7 kg (182 lb 6.4 oz).   Weight management plan: Discussed healthy diet and exercise guidelines    See Patient Instructions    No follow-ups on file.    Torres Hill MD  [facility]    Kevin Peñaloza is a 41 year old who presents for the following health issues  accompanied by self    A.D.H.D    History of Present Illness       Reason for visit:  Medication checkup  Symptom onset:  More than a month  Symptoms include:  Inattention, general malaise  Symptom intensity:  Moderate  Symptom progression:  Improving  Had these symptoms before:  Yes  Has tried/received treatment for these symptoms:  Yes  Previous treatment was successful:  Yes  Prior treatment description:  Medication, that which I'm currently taking    She eats 2-3 servings of fruits and vegetables daily.She consumes 2 sweetened beverage(s) daily.She exercises with enough effort to increase her heart rate 10 to 19 minutes per day.  She exercises with enough effort to increase her heart rate 3 or less days per week.   She is taking medications regularly.       ADHD        Review of Systems   Constitutional, HEENT, cardiovascular, pulmonary, gi and gu systems are negative, except as otherwise noted.    "   Objective    Wt 82.7 kg (182 lb 6.4 oz)   BMI 28.57 kg/m    Body mass index is 28.57 kg/m .  Physical Exam   General:  Alert and oriented, No acute distress.    Eye: Normal conjunctiva.     HENT:  Oral mucosa is moist.     Neck:  Supple.     Respiratory:  Respirations are non-labored.     Cardiovascular:  Normal rate   Musculoskeletal:  Normal gait.     Psychiatric:  Cooperative, Appropriate mood & affect, Normal judgment.

## 2022-03-29 DIAGNOSIS — Z72.0 TOBACCO ABUSE: ICD-10-CM

## 2022-03-31 RX ORDER — VARENICLINE TARTRATE 1 MG/1
1 TABLET, FILM COATED ORAL 2 TIMES DAILY
Qty: 60 TABLET | Refills: 0 | Status: SHIPPED | OUTPATIENT
Start: 2022-03-31 | End: 2022-05-05

## 2022-03-31 NOTE — TELEPHONE ENCOUNTER
"Routing refill request to provider for review/approval because:  Please review for continued use- uncertain on current tobacco use     Last Written Prescription Date:  2/28/22  Last Fill Quantity: 60,  # refills: 0   Last office visit provider:  3/28/22     Requested Prescriptions   Pending Prescriptions Disp Refills     varenicline (CHANTIX) 1 MG tablet [Pharmacy Med Name: Varenicline Tartrate Oral Tablet 1 MG] 60 tablet 0     Sig: Take 1 tablet (1 mg) by mouth 2 times daily       Partial Cholinergic Nicotinic Agonist Agents Passed - 3/29/2022  8:04 AM        Passed - Blood pressure under 140/90 in past 12 months     BP Readings from Last 3 Encounters:   03/28/22 122/70   10/21/21 123/73   07/07/21 121/76                 Passed - Recent (12 mo) or future (30 days) visit within the authorizing provider's specialty     Patient has had an office visit with the authorizing provider or a provider within the authorizing providers department within the previous 12 mos or has a future within next 30 days. See \"Patient Info\" tab in inbasket, or \"Choose Columns\" in Meds & Orders section of the refill encounter.              Passed - Medication is active on med list        Passed - Patient is 18 years of age or older        Passed - Patient is not pregnant        Passed - No positive pregnancy test on file in past 12 months             Eugenia Agarwal RN 03/31/22 8:55 AM  "

## 2022-05-05 ENCOUNTER — MYC REFILL (OUTPATIENT)
Dept: FAMILY MEDICINE | Facility: CLINIC | Age: 42
End: 2022-05-05
Payer: COMMERCIAL

## 2022-05-05 DIAGNOSIS — Z72.0 TOBACCO ABUSE: ICD-10-CM

## 2022-05-09 RX ORDER — VARENICLINE TARTRATE 1 MG/1
1 TABLET, FILM COATED ORAL 2 TIMES DAILY
Qty: 60 TABLET | Refills: 3 | Status: SHIPPED | OUTPATIENT
Start: 2022-05-09 | End: 2022-11-08

## 2022-05-09 NOTE — TELEPHONE ENCOUNTER
"Routing refill request to provider for review/approval because:  No established PCP  Last Written Prescription Date:  3/31/22  Last Fill Quantity: 60,  # refills: 0   Last office visit provider:  3/28/22     Requested Prescriptions   Pending Prescriptions Disp Refills     varenicline (CHANTIX) 1 MG tablet 60 tablet 3     Sig: Take 1 tablet (1 mg) by mouth 2 times daily       Partial Cholinergic Nicotinic Agonist Agents Passed - 5/5/2022  4:26 PM        Passed - Blood pressure under 140/90 in past 12 months     BP Readings from Last 3 Encounters:   03/28/22 122/70   10/21/21 123/73   07/07/21 121/76                 Passed - Recent (12 mo) or future (30 days) visit within the authorizing provider's specialty     Patient has had an office visit with the authorizing provider or a provider within the authorizing providers department within the previous 12 mos or has a future within next 30 days. See \"Patient Info\" tab in inbasket, or \"Choose Columns\" in Meds & Orders section of the refill encounter.              Passed - Medication is active on med list        Passed - Patient is 18 years of age or older        Passed - Patient is not pregnant        Passed - No positive pregnancy test on file in past 12 months             Naty Mitchell 05/08/22 9:15 PM    "

## 2022-06-01 ENCOUNTER — TRANSFERRED RECORDS (OUTPATIENT)
Dept: MULTI SPECIALTY CLINIC | Facility: CLINIC | Age: 42
End: 2022-06-01

## 2022-06-01 LAB — PAP SMEAR - HIM PATIENT REPORTED: NORMAL

## 2022-09-03 ENCOUNTER — HEALTH MAINTENANCE LETTER (OUTPATIENT)
Age: 42
End: 2022-09-03

## 2022-09-26 ENCOUNTER — MYC REFILL (OUTPATIENT)
Dept: FAMILY MEDICINE | Facility: CLINIC | Age: 42
End: 2022-09-26

## 2022-09-26 DIAGNOSIS — F90.0 ATTENTION-DEFICIT HYPERACTIVITY DISORDER, PREDOMINANTLY INATTENTIVE TYPE: ICD-10-CM

## 2022-09-26 RX ORDER — DEXTROAMPHETAMINE SACCHARATE, AMPHETAMINE ASPARTATE, DEXTROAMPHETAMINE SULFATE AND AMPHETAMINE SULFATE 5; 5; 5; 5 MG/1; MG/1; MG/1; MG/1
20 TABLET ORAL 2 TIMES DAILY
Qty: 60 TABLET | Refills: 0 | Status: SHIPPED | OUTPATIENT
Start: 2022-09-26 | End: 2023-03-28

## 2022-10-25 ENCOUNTER — MYC REFILL (OUTPATIENT)
Dept: FAMILY MEDICINE | Facility: CLINIC | Age: 42
End: 2022-10-25

## 2022-10-25 DIAGNOSIS — F90.0 ATTENTION-DEFICIT HYPERACTIVITY DISORDER, PREDOMINANTLY INATTENTIVE TYPE: ICD-10-CM

## 2022-10-25 DIAGNOSIS — F90.0 ATTENTION DEFICIT HYPERACTIVITY DISORDER (ADHD), PREDOMINANTLY INATTENTIVE TYPE: ICD-10-CM

## 2022-10-25 RX ORDER — DEXTROAMPHETAMINE SACCHARATE, AMPHETAMINE ASPARTATE, DEXTROAMPHETAMINE SULFATE AND AMPHETAMINE SULFATE 5; 5; 5; 5 MG/1; MG/1; MG/1; MG/1
20 TABLET ORAL 2 TIMES DAILY
Qty: 60 TABLET | Refills: 0 | OUTPATIENT
Start: 2022-10-25

## 2022-10-26 RX ORDER — DEXTROAMPHETAMINE SACCHARATE, AMPHETAMINE ASPARTATE, DEXTROAMPHETAMINE SULFATE AND AMPHETAMINE SULFATE 5; 5; 5; 5 MG/1; MG/1; MG/1; MG/1
20 TABLET ORAL 2 TIMES DAILY
Qty: 60 TABLET | Refills: 0 | Status: SHIPPED | OUTPATIENT
Start: 2022-10-26 | End: 2023-03-28

## 2022-11-08 ENCOUNTER — OFFICE VISIT (OUTPATIENT)
Dept: FAMILY MEDICINE | Facility: CLINIC | Age: 42
End: 2022-11-08
Payer: COMMERCIAL

## 2022-11-08 VITALS
HEIGHT: 67 IN | DIASTOLIC BLOOD PRESSURE: 85 MMHG | HEART RATE: 82 BPM | BODY MASS INDEX: 30.89 KG/M2 | TEMPERATURE: 98.3 F | SYSTOLIC BLOOD PRESSURE: 141 MMHG | OXYGEN SATURATION: 100 % | WEIGHT: 196.8 LBS

## 2022-11-08 DIAGNOSIS — R53.83 FATIGUE, UNSPECIFIED TYPE: ICD-10-CM

## 2022-11-08 DIAGNOSIS — F33.42 MAJOR DEPRESSIVE DISORDER, RECURRENT EPISODE, IN FULL REMISSION (H): ICD-10-CM

## 2022-11-08 DIAGNOSIS — G47.19 EXCESSIVE DAYTIME SLEEPINESS: ICD-10-CM

## 2022-11-08 DIAGNOSIS — Z23 NEED FOR VACCINATION: ICD-10-CM

## 2022-11-08 DIAGNOSIS — F90.0 ATTENTION DEFICIT HYPERACTIVITY DISORDER (ADHD), PREDOMINANTLY INATTENTIVE TYPE: Primary | ICD-10-CM

## 2022-11-08 LAB
ERYTHROCYTE [DISTWIDTH] IN BLOOD BY AUTOMATED COUNT: 12.9 % (ref 10–15)
HCT VFR BLD AUTO: 38.8 % (ref 35–47)
HGB BLD-MCNC: 12.8 G/DL (ref 11.7–15.7)
MCH RBC QN AUTO: 30 PG (ref 26.5–33)
MCHC RBC AUTO-ENTMCNC: 33 G/DL (ref 31.5–36.5)
MCV RBC AUTO: 91 FL (ref 78–100)
PLATELET # BLD AUTO: 296 10E3/UL (ref 150–450)
RBC # BLD AUTO: 4.27 10E6/UL (ref 3.8–5.2)
TSH SERPL DL<=0.005 MIU/L-ACNC: 2.06 UIU/ML (ref 0.3–4.2)
WBC # BLD AUTO: 8.2 10E3/UL (ref 4–11)

## 2022-11-08 PROCEDURE — 96127 BRIEF EMOTIONAL/BEHAV ASSMT: CPT | Performed by: FAMILY MEDICINE

## 2022-11-08 PROCEDURE — 0134A COVID-19,PF,MODERNA BIVALENT: CPT | Performed by: FAMILY MEDICINE

## 2022-11-08 PROCEDURE — 90686 IIV4 VACC NO PRSV 0.5 ML IM: CPT | Performed by: FAMILY MEDICINE

## 2022-11-08 PROCEDURE — 85027 COMPLETE CBC AUTOMATED: CPT | Performed by: FAMILY MEDICINE

## 2022-11-08 PROCEDURE — 36415 COLL VENOUS BLD VENIPUNCTURE: CPT | Performed by: FAMILY MEDICINE

## 2022-11-08 PROCEDURE — 90471 IMMUNIZATION ADMIN: CPT | Performed by: FAMILY MEDICINE

## 2022-11-08 PROCEDURE — 99214 OFFICE O/P EST MOD 30 MIN: CPT | Mod: 25 | Performed by: FAMILY MEDICINE

## 2022-11-08 PROCEDURE — 84443 ASSAY THYROID STIM HORMONE: CPT | Performed by: FAMILY MEDICINE

## 2022-11-08 PROCEDURE — 91313 COVID-19,PF,MODERNA BIVALENT: CPT | Performed by: FAMILY MEDICINE

## 2022-11-08 RX ORDER — DEXTROAMPHETAMINE SACCHARATE, AMPHETAMINE ASPARTATE, DEXTROAMPHETAMINE SULFATE AND AMPHETAMINE SULFATE 5; 5; 5; 5 MG/1; MG/1; MG/1; MG/1
20 TABLET ORAL 2 TIMES DAILY
Qty: 60 TABLET | Refills: 0 | Status: SHIPPED | OUTPATIENT
Start: 2022-11-08 | End: 2023-01-03

## 2022-11-08 ASSESSMENT — PATIENT HEALTH QUESTIONNAIRE - PHQ9
SUM OF ALL RESPONSES TO PHQ QUESTIONS 1-9: 2
SUM OF ALL RESPONSES TO PHQ QUESTIONS 1-9: 2
10. IF YOU CHECKED OFF ANY PROBLEMS, HOW DIFFICULT HAVE THESE PROBLEMS MADE IT FOR YOU TO DO YOUR WORK, TAKE CARE OF THINGS AT HOME, OR GET ALONG WITH OTHER PEOPLE: NOT DIFFICULT AT ALL

## 2022-11-08 NOTE — PROGRESS NOTES
"  Assessment & Plan     Attention deficit hyperactivity disorder (ADHD), predominantly inattentive type  Medication does not seem to be as effective.  Further investigation into causes of fatigue prior to making any changes in her medication.  This is been refilled for 1 month.    Recurrent Major Depression In Full Remission  In remission, continue to monitor for new symptoms    Excessive daytime sleepiness  Excessive daytime sleepiness with numerous causes.  She snores quite a bit and has had witnessed apneic spells.  Set up for home sleep study.  - CBC with platelets  - TSH  - HST-Home Sleep Apnea Test - Noxturnal Returnable; Future    Fatigue, unspecified type  See above  - CBC with platelets  - TSH  - HST-Home Sleep Apnea Test - Noxturnal Returnable; Future    Need for vaccination  - INFLUENZA VACCINE IM > 6 MONTHS VALENT IIV4 (AFLURIA/FLUZONE)             Nicotine/Tobacco Cessation:  She reports that she has been smoking cigarettes. She started smoking about 23 years ago. She has been exposed to tobacco smoke. She has never used smokeless tobacco.  Nicotine/Tobacco Cessation Plan:   Self help information given to patient      BMI:   Estimated body mass index is 30.82 kg/m  as calculated from the following:    Height as of this encounter: 1.702 m (5' 7\").    Weight as of this encounter: 89.3 kg (196 lb 12.8 oz).   Weight management plan: Discussed healthy diet and exercise guidelines        No follow-ups on file.    Torres Hill MD  [facility] [place]    Kevin Peñaloza is a 41 year old accompanied by her self, presenting for the following health issues:  A.D.H.D (ADHD follow up, refill meds) and Sleep Problem (Discuss sleep study)      History of Present Illness       Reason for visit:  Medication check    She eats 2-3 servings of fruits and vegetables daily.She consumes 3 sweetened beverage(s) daily.She exercises with enough effort to increase her heart rate 10 to 19 minutes per " "day.  She exercises with enough effort to increase her heart rate 3 or less days per week.   She is taking medications regularly.    Today's PHQ-9         PHQ-9 Total Score: 2    PHQ-9 Q9 Thoughts of better off dead/self-harm past 2 weeks :   Not at all    How difficult have these problems made it for you to do your work, take care of things at home, or get along with other people: Not difficult at all       Medication Followup of ADHD    Taking Medication as prescribed: yes    Side Effects:  None    Medication Helping Symptoms:  Sometimes, not helping like it used to    Patent presents for attention deficit disorder follow up.  There have been no problems with the medication. The current dose is controlling symptoms. There are no other concerns.     Also discuss issues with sleep, is interested in sleep study    Reports decreased motivation, fatigue, weight gain      Review of Systems   Constitutional, HEENT, cardiovascular, pulmonary, gi and gu systems are negative, except as otherwise noted.      Objective    BP (!) 142/82 (BP Location: Right arm, Patient Position: Sitting, Cuff Size: Adult Large)   Pulse 95   Temp 98.3  F (36.8  C) (Tympanic)   Ht 1.702 m (5' 7\")   Wt 89.3 kg (196 lb 12.8 oz)   SpO2 100%   BMI 30.82 kg/m    Body mass index is 30.82 kg/m .  Physical Exam   General:  Alert and oriented, No acute distress.    Eye: Normal conjunctiva.     HENT:  Oral mucosa is moist.     Neck:  Supple.     Respiratory:  Respirations are non-labored.     Cardiovascular:  Normal rate   Musculoskeletal:  Normal gait.     Psychiatric:  Cooperative, Appropriate mood & affect, Normal judgment.                       "

## 2022-11-30 NOTE — LETTER
Letter by Laurie Leary at      Author: Laurie Leary Service: -- Author Type: --    Filed:  Encounter Date: 3/24/2020 Status: (Other)         March 24, 2020       Anabela Bartholomew  [address] [address]    Dear Anabela Bartholomew:    We are pleased to provide you with secure, online access to medical information for you and your family within [facility]. Per your request, we have expanded your account to allow access to the records of the following family members:              Titus Israel (privilege ends on 3/24/2021.)     How Do I Log In?  1. In your Internet browser, go to [email or web address]  2. Log into [employer] using your [employer] Username and Password.  3. Click Sign In.        How Do I Access a Family Member's Account?  4. Select the account you want to access by clicking the [place] with the appropriate patient's name at the top of your screen.   5. You will see a disclaimer page letting you know that you will be viewing a family member's record. Review the disclaimer and then click Accept Proxy Access Disclaimer to proceed.  6. Once you switch to viewing a family member's record, you can navigate to [employer] pages the same way you would for yourself. You can return to your own account by clicking the [place] at the top of the screen with your name on it.    7. To customize the colors and names of the linked accounts, you can select Personalize from the Profile dropdown menu at the top of the screen, then click the Edit button to make changes.     Additional Information  If you have questions, visit [email or web address], e-mail [email or web address] or call [phone] to talk to our [employer] staff. Remember, [employer] is NOT to be used for urgent needs. For medical emergencies, dial 911.              None needed

## 2022-12-12 ENCOUNTER — OFFICE VISIT (OUTPATIENT)
Dept: SLEEP MEDICINE | Facility: CLINIC | Age: 42
End: 2022-12-12
Attending: FAMILY MEDICINE
Payer: COMMERCIAL

## 2022-12-12 DIAGNOSIS — R53.83 FATIGUE, UNSPECIFIED TYPE: ICD-10-CM

## 2022-12-12 DIAGNOSIS — G47.19 EXCESSIVE DAYTIME SLEEPINESS: ICD-10-CM

## 2022-12-12 PROCEDURE — G0399 HOME SLEEP TEST/TYPE 3 PORTA: HCPCS | Performed by: INTERNAL MEDICINE

## 2022-12-13 ENCOUNTER — DOCUMENTATION ONLY (OUTPATIENT)
Dept: SLEEP MEDICINE | Facility: CLINIC | Age: 42
End: 2022-12-13
Payer: COMMERCIAL

## 2022-12-13 NOTE — PROGRESS NOTES
Pt returned HST device. It was downloaded and forwarded data to the clinical specialist for scoring.      HST POST-STUDY QUESTIONNAIRE    1. What time did you go to bed?  11ish  2. How long do you think it took to fall asleep?  1 minute  3. What time did you wake up to start the day?  6:17am  4. Did you get up during the night at all?  ye  5. If you woke up, do you remember approximately what time(s)? no  6. Did you have any difficulty with the equipment?  No  7. Did you us any type of treatment with this study?  None  8. Was the head of the bed elevated? No  9. Did you sleep in a recliner?  No  10. Did you stop using CPAP at least 3 days before this test?  NA  11. Any other information you'd like us to know? n/a

## 2022-12-14 NOTE — PROGRESS NOTES
This HSAT was performed using a Noxturnal T3 device which recorded snore, sound, movement activity, body position, nasal pressure, oronasal thermal airflow, pulse, oximetry and both chest and abdominal respiratory effort. HSAT data was restricted to the time patient states they were in bed.     HSAT was scored using 1B 4% hypopnea rule.     HST AHI (Non-PAT): 2.7  Time with SpO2 below 89% was 0 minutes.   Overall signal quality was good     Pt will follow up with sleep provider to determine appropriate therapy.

## 2022-12-16 NOTE — PROGRESS NOTES
"HOME SLEEP STUDY INTERPRETATION        Patient: Anabela Bartholomew  MRN: [id number]  YOB: 1980  Study Date: 12/12/2022  PCP/Referring Provider: Torres Hill;   Ordering Provider: [unfilled]         Indications for Home Study: Anabela Bartholomew is a 41 year old female with a history of anxiety who presents with symptoms suggestive of obstructive sleep apnea.    Estimated body mass index is 30.82 kg/m  as calculated from the following:    Height as of 11/8/22: 1.702 m (5' 7\").    Weight as of 11/8/22: 89.3 kg (196 lb 12.8 oz).  No data recorded  No data recorded        Data: A full night home sleep study was performed recording the standard physiologic parameters including body position, movement, sound, nasal pressure, thermal oral airflow, chest and abdominal movements with respiratory inductance plethysmography, and oxygen saturation by pulse oximetry. Pulse rate was estimated by oximetry recording. This study was considered adequate based on > 4 hours of quality oximetry and respiratory recording. As specified by the AASM Manual for the Scoring of Sleep and Associated events, version 2.3, Rule VIII.D 1B, 4% oxygen desaturation scoring for hypopneas is used as a standard of care on all home sleep apnea testing.        Analysis Time:  437 minutes        Respiration:   Sleep Associated Hypoxemia: sustained hypoxemia was not present. Baseline oxygen saturation was 95.4%.  Time with saturation less than or equal to 88% was 0 minutes. The lowest oxygen saturation was 89%.   Snoring: Snoring was present.  Respiratory events: The home study revealed a presence of 14 obstructive apneas and 2 mixed and central apneas. There were 4 hypopneas resulting in a combined apnea/hypopnea index [AHI] of 2.7 events per hour.  AHI was 1.4 per hour supine, 2.9 per hour non-supine.  Pattern: Excluding events noted above, respiratory rate and pattern was Normal.      Position: Percent of time spent: supine - 9.8%, " prone - 38.3%, on left - 47.9%, on right - 4%.      Heart Rate: By pulse oximetry normal rate was noted.         Assessment    Obstructive sleep apnea was not confirmed on the study    Sleep associated hypoxemia was not present.    Recommendations:    Consider In lab polysomnogram if clinically warranted.    Suggest optimizing sleep hygiene and avoiding sleep deprivation.    Weight management.        Diagnosis Code(s): Snoring R06.83    Oscar Hamilton MD, December 16, 2022   Diplomate, American Board of Internal Medicine, Sleep Medicine

## 2022-12-20 ASSESSMENT — SLEEP AND FATIGUE QUESTIONNAIRES
HOW LIKELY ARE YOU TO NOD OFF OR FALL ASLEEP WHILE SITTING AND READING: HIGH CHANCE OF DOZING
HOW LIKELY ARE YOU TO NOD OFF OR FALL ASLEEP WHEN YOU ARE A PASSENGER IN A CAR FOR AN HOUR WITHOUT A BREAK: HIGH CHANCE OF DOZING
HOW LIKELY ARE YOU TO NOD OFF OR FALL ASLEEP WHILE LYING DOWN TO REST IN THE AFTERNOON WHEN CIRCUMSTANCES PERMIT: HIGH CHANCE OF DOZING
HOW LIKELY ARE YOU TO NOD OFF OR FALL ASLEEP WHILE WATCHING TV: MODERATE CHANCE OF DOZING
HOW LIKELY ARE YOU TO NOD OFF OR FALL ASLEEP WHILE SITTING QUIETLY AFTER LUNCH WITHOUT ALCOHOL: MODERATE CHANCE OF DOZING
HOW LIKELY ARE YOU TO NOD OFF OR FALL ASLEEP WHILE SITTING INACTIVE IN A PUBLIC PLACE: MODERATE CHANCE OF DOZING
HOW LIKELY ARE YOU TO NOD OFF OR FALL ASLEEP IN A CAR, WHILE STOPPED FOR A FEW MINUTES IN TRAFFIC: WOULD NEVER DOZE
HOW LIKELY ARE YOU TO NOD OFF OR FALL ASLEEP WHILE SITTING AND TALKING TO SOMEONE: WOULD NEVER DOZE

## 2022-12-23 ENCOUNTER — OFFICE VISIT (OUTPATIENT)
Dept: SLEEP MEDICINE | Facility: CLINIC | Age: 42
End: 2022-12-23
Payer: COMMERCIAL

## 2022-12-23 VITALS
OXYGEN SATURATION: 100 % | HEART RATE: 96 BPM | WEIGHT: 196 LBS | BODY MASS INDEX: 30.7 KG/M2 | DIASTOLIC BLOOD PRESSURE: 90 MMHG | SYSTOLIC BLOOD PRESSURE: 132 MMHG

## 2022-12-23 DIAGNOSIS — E66.09 CLASS 1 OBESITY DUE TO EXCESS CALORIES WITHOUT SERIOUS COMORBIDITY WITH BODY MASS INDEX (BMI) OF 30.0 TO 30.9 IN ADULT: ICD-10-CM

## 2022-12-23 DIAGNOSIS — E66.811 CLASS 1 OBESITY DUE TO EXCESS CALORIES WITHOUT SERIOUS COMORBIDITY WITH BODY MASS INDEX (BMI) OF 30.0 TO 30.9 IN ADULT: ICD-10-CM

## 2022-12-23 DIAGNOSIS — R06.83 SNORING: Primary | ICD-10-CM

## 2022-12-23 DIAGNOSIS — G47.19 EXCESSIVE DAYTIME SLEEPINESS: ICD-10-CM

## 2022-12-23 PROCEDURE — 99213 OFFICE O/P EST LOW 20 MIN: CPT | Performed by: INTERNAL MEDICINE

## 2022-12-23 RX ORDER — LACTOBACILLUS RHAMNOSUS GG 10B CELL
1 CAPSULE ORAL 2 TIMES DAILY
COMMUNITY

## 2022-12-23 RX ORDER — CETIRIZINE HYDROCHLORIDE 10 MG/1
10 TABLET ORAL PRN
COMMUNITY

## 2022-12-23 NOTE — PATIENT INSTRUCTIONS
Your Body mass index is 30.7 kg/m .  Weight management is a personal decision.  If you are interested in exploring weight loss strategies, the following discussion covers the approaches that may be successful. Body mass index (BMI) is one way to tell whether you are at a healthy weight, overweight, or obese. It measures your weight in relation to your height.  A BMI of 18.5 to 24.9 is in the healthy range. A person with a BMI of 25 to 29.9 is considered overweight, and someone with a BMI of 30 or greater is considered obese. More than two-thirds of American adults are considered overweight or obese.  Being overweight or obese increases the risk for further weight gain. Excess weight may lead to heart disease and diabetes.  Creating and following plans for healthy eating and physical activity may help you improve your health.  Weight control is part of healthy lifestyle and includes exercise, emotional health, and healthy eating habits. Careful eating habits lifelong are the mainstay of weight control. Though there are significant health benefits from weight loss, long-term weight loss with diet alone may be very difficult to achieve- studies show long-term success with dietary management in less than 10% of people. Attaining a healthy weight may be especially difficult to achieve in those with severe obesity. In some cases, medications, devices and surgical management might be considered.  What can you do?  If you are overweight or obese and are interested in methods for weight loss, you should discuss this with your provider.     Consider reducing daily calorie intake by 500 calories.     Keep a food journal.     Avoiding skipping meals, consider cutting portions instead.    Diet combined with exercise helps maintain muscle while optimizing fat loss. Strength training is particularly important for building and maintaining muscle mass. Exercise helps reduce stress, increase energy, and improves fitness.  Increasing exercise without diet control, however, may not burn enough calories to loose weight.       Start walking three days a week 10-20 minutes at a time    Work towards walking thirty minutes five days a week     Eventually, increase the speed of your walking for 1-2 minutes at time    In addition, we recommend that you review healthy lifestyles and methods for weight loss available through the National Institutes of Health patient information sites:  http://win.niddk.nih.gov/publications/index.htm    And look into health and wellness programs that may be available through your health insurance provider, employer, local community center, or edd club.

## 2022-12-23 NOTE — PROGRESS NOTES
"  Chief Complaint   Patient presents with     Study Results   Patient has had lifelong snoring no she has had worsening snoring with a weight gain over the past 6 to 12 months.  Daytime sleepiness for perhaps the past 6 months.  No history of chronic daytime sleepiness prior to this.  No cataplectic symptoms.  No leg complaints.  Mother has sleep apnea.  Home sleep test does not confirm sleep apnea but certainly does not rule it out in this context.  Discussed treatments available for sleep apnea.  Discussed having an in lab polysomnogram for diagnostic purposes which she would like to pursue.    Anabela Bartholomew is a 41 year old female who returns to [facility] for review of sleep testing results. She presented with symptoms suggestive of obstructive sleep apnea.    Estimated body mass index is 30.7 kg/m  as calculated from the following:    Height as of 11/8/22: 1.702 m (5' 7\").    Weight as of this encounter: 88.9 kg (196 lb).  Total score - [place]: 15 (12/20/2022 11:52 AM)  No data recorded    Home Sleep Apnea Testing - 12/12/22: 196 lbs 0 oz: AHI 2.7/hr. Supine AHI 1.4/hr.   Oxygen Kareem of 89%.  Baseline 95.4%.  Sp02 =< 88% for 0 minutes  She slept on her back (9.8%), prone (38.3%), left (47.9) and right (4%) sides.   Analysis time: 437 minutes.     Signal quality of Oxymeter 99.7% Good  Nasal Cannula 100% Good  RIP belts 100% Good.         Anabela Bartholomew reports that she slept Good .     Results were reviewed in detail today with Anabela and a copy given to her for her records.    Reviewed by team:  Tobacco  Allergies  Meds            Reviewed by provider:                   Problem List:  Patient Active Problem List    Diagnosis Date Noted     Attention deficit hyperactivity disorder (ADHD), predominantly inattentive type 03/02/2022     Priority: Medium     Anxiety Disorder NOS      Priority: Medium     Created by Conversion  Replacement Utility updated for latest IMO load     "     Chronic rhinitis      Priority: Medium     Created by Conversion  Replacement Utility updated for latest IMO load         Onychomycosis of left great toe 05/11/2016     Priority: Medium     Class 1 obesity due to excess calories without serious comorbidity with body mass index (BMI) of 30.0 to 30.9 in adult      Priority: Medium     Created by Conversion         Recurrent Major Depression In Full Remission      Priority: Medium     Created by Conversion            BP (!) 132/90   Pulse 96   Wt 88.9 kg (196 lb)   SpO2 100%   BMI 30.70 kg/m    Patient is a healthy-appearing woman in no distress.  Alert and oriented.  Normal mood and affect.  HEENT exam reveals a crowded oropharynx.  Neck is not thick.  Lungs clear.  Card exam regular.  No edema.  Cranial nerves normal.  Impression/Plan:  Patient with a compelling story for sleep apnea and a negative home test which could well be a false negative particularly given the fact she had very little supine sleep.  After discussion of the treatment options as well as the option of an in lab polysomnogram we decided to pursue in lab testing.    Sleep associated hypoxemia was not present.     Treatment options discussed today including auto-CPAP at 5-20 cmH2O, oral appliance therapy, positional therapy, polysomnography with full night PAP titration, surgical options or Inspire.  Bulk of our time was spent discussing further work-up of her symptoms with an in lab  In lab polysomnogram for diagnostic purposes ordered.    She will follow up with me after in lab polysomnogram    Fifteen minutes spent with patient, all of which were spent face-to-face counseling, consulting, coordinating plan of care.      Oscar Hamilton MD    CC:  Torres Hill,  (only for reference, does not automatically route).

## 2022-12-23 NOTE — PROGRESS NOTES
{PROVIDER CHARTING PREFERENCE:097594}    Subjective   Anabela is a 41 year old accompanied by her self, presenting for the following health issues:  Study Results      HPI     ***    Review of Systems   {ROS COMP (Optional):416821}      Objective    BP (!) 132/90   Pulse 96   Wt 88.9 kg (196 lb)   SpO2 100%   BMI 30.70 kg/m    Body mass index is 30.7 kg/m .  Physical Exam   {Exam List (Optional):101793}    {Diagnostic Test Results (Optional):572064}    {AMBULATORY ATTESTATION (Optional):848892}

## 2023-01-02 DIAGNOSIS — F90.0 ATTENTION DEFICIT HYPERACTIVITY DISORDER (ADHD), PREDOMINANTLY INATTENTIVE TYPE: Primary | ICD-10-CM

## 2023-01-03 RX ORDER — DEXTROAMPHETAMINE SACCHARATE, AMPHETAMINE ASPARTATE, DEXTROAMPHETAMINE SULFATE AND AMPHETAMINE SULFATE 5; 5; 5; 5 MG/1; MG/1; MG/1; MG/1
20 TABLET ORAL 2 TIMES DAILY
Qty: 60 TABLET | Refills: 0 | Status: SHIPPED | OUTPATIENT
Start: 2023-01-03 | End: 2023-02-05

## 2023-01-05 DIAGNOSIS — J30.9 ALLERGIC RHINITIS: ICD-10-CM

## 2023-01-05 RX ORDER — FLUTICASONE PROPIONATE 50 MCG
2 SPRAY, SUSPENSION (ML) NASAL DAILY
Qty: 16 G | Refills: 1 | Status: SHIPPED | OUTPATIENT
Start: 2023-01-05 | End: 2023-03-15

## 2023-01-05 RX ORDER — AZELASTINE 1 MG/ML
SPRAY, METERED NASAL
Qty: 30 ML | Refills: 3 | Status: SHIPPED | OUTPATIENT
Start: 2023-01-05 | End: 2023-05-16

## 2023-01-05 NOTE — TELEPHONE ENCOUNTER
Routing refill request to provider for review/approval because:  A break in medication, last written 2016    Last Written Prescription Date: 5/11/2016   Last office visit: 11/8/2022 with prescribing provider

## 2023-01-05 NOTE — TELEPHONE ENCOUNTER
Medication Question or Refill    Contacts       Type Contact Phone/Fax    01/05/2023 09:06 AM CST Phone (Incoming) Anabela Bartholomew (Self) [phone] (H)          What medication are you calling about (include dose and sig)?:    fluticasone (FLONASE) 50 mcg/actuation nasal spray 16 g 3 11/4/2015  No   Sig: [FLUTICASONE (FLONASE) 50 MCG/ACTUATION NASAL SPRAY] INHALE 2 SPRAYS IN EACH NOSTRIL DAILY     azelastine (ASTELIN) 137 mcg (0.1 %) nasal spray 30 mL 3 5/11/2016  No   Sig: [AZELASTINE (ASTELIN) 137 MCG (0.1 %) NASAL SPRAY] INSERT 2 SQUIRTS IN EACH NOSTRIL TWICE DAILY         Controlled Substance Agreement on file:   CSA -- Patient Level:    CSA: None found at the patient level.       Who prescribed the medication?: Gregoria Haas & Mark Barnett    Do you need a refill? Yes: On both nasal sprays    When did you use the medication last? unknown    Patient offered an appointment? No    Do you have any questions or concerns?  No    Preferred Pharmacy:     [id number] [place] [address] [[place] [address]  Phone: [phone] Fax: [phone]      Could we send this information to you in [facility] or would you prefer to receive a phone call?:   Patient would prefer a phone call   Okay to leave a detailed message?: Yes at Home number on file [phone] (home)      Carol Walters

## 2023-01-14 ENCOUNTER — HEALTH MAINTENANCE LETTER (OUTPATIENT)
Age: 43
End: 2023-01-14

## 2023-02-05 DIAGNOSIS — F90.0 ATTENTION DEFICIT HYPERACTIVITY DISORDER (ADHD), PREDOMINANTLY INATTENTIVE TYPE: ICD-10-CM

## 2023-02-05 RX ORDER — DEXTROAMPHETAMINE SACCHARATE, AMPHETAMINE ASPARTATE, DEXTROAMPHETAMINE SULFATE AND AMPHETAMINE SULFATE 5; 5; 5; 5 MG/1; MG/1; MG/1; MG/1
TABLET ORAL
Qty: 60 TABLET | Refills: 0 | Status: SHIPPED | OUTPATIENT
Start: 2023-02-05 | End: 2023-03-19

## 2023-03-14 DIAGNOSIS — J30.9 ALLERGIC RHINITIS: ICD-10-CM

## 2023-03-15 ENCOUNTER — MYC REFILL (OUTPATIENT)
Dept: FAMILY MEDICINE | Facility: CLINIC | Age: 43
End: 2023-03-15
Payer: COMMERCIAL

## 2023-03-15 DIAGNOSIS — F90.0 ATTENTION DEFICIT HYPERACTIVITY DISORDER (ADHD), PREDOMINANTLY INATTENTIVE TYPE: ICD-10-CM

## 2023-03-15 RX ORDER — DEXTROAMPHETAMINE SACCHARATE, AMPHETAMINE ASPARTATE, DEXTROAMPHETAMINE SULFATE AND AMPHETAMINE SULFATE 5; 5; 5; 5 MG/1; MG/1; MG/1; MG/1
20 TABLET ORAL 2 TIMES DAILY
Qty: 60 TABLET | Refills: 0 | OUTPATIENT
Start: 2023-03-15

## 2023-03-15 RX ORDER — FLUTICASONE PROPIONATE 50 MCG
2 SPRAY, SUSPENSION (ML) NASAL DAILY
Qty: 16 G | Refills: 0 | Status: SHIPPED | OUTPATIENT
Start: 2023-03-15 | End: 2023-04-17

## 2023-03-15 NOTE — TELEPHONE ENCOUNTER
Routing refill request to provider for review/approval because:  Drug not on the [facility] refill protocol      AB Salgado  [facility]

## 2023-03-16 NOTE — TELEPHONE ENCOUNTER
See My Chart messages.  Patient was updated (due for appointment) for refills.  Patient has scheduled appointment and requesting refill to bridge to appointment.      Last office visit: 11/8/2022     Future Appointments 3/16/2023 - 9/12/2023      Date Visit Type Length Department Provider     3/28/2023 11:20 AM [facility] OFFICE VISIT  20 min RVFL FP/IM/Torres Smith MD    Location Instructions:     [facility] is located at [facility], one block north of [facility] and the [facility]. The clinic shares a building with the [facility]; use the clinic s parking lot and entrance on the building s south side.                    Requested Prescriptions   Pending Prescriptions Disp Refills     amphetamine-dextroamphetamine (ADDERALL) 20 MG tablet 60 tablet 0     Sig: Take 1 tablet (20 mg) by mouth 2 times daily       There is no refill protocol information for this order      Refused Prescriptions Disp Refills     amphetamine-dextroamphetamine (ADDERALL) 20 MG tablet 60 tablet 0     Sig: Take 1 tablet (20 mg) by mouth 2 times daily       There is no refill protocol information for this order

## 2023-03-19 RX ORDER — DEXTROAMPHETAMINE SACCHARATE, AMPHETAMINE ASPARTATE, DEXTROAMPHETAMINE SULFATE AND AMPHETAMINE SULFATE 5; 5; 5; 5 MG/1; MG/1; MG/1; MG/1
20 TABLET ORAL 2 TIMES DAILY
Qty: 60 TABLET | Refills: 0 | Status: SHIPPED | OUTPATIENT
Start: 2023-03-19 | End: 2023-03-28

## 2023-03-28 ENCOUNTER — OFFICE VISIT (OUTPATIENT)
Dept: FAMILY MEDICINE | Facility: CLINIC | Age: 43
End: 2023-03-28
Payer: COMMERCIAL

## 2023-03-28 VITALS
HEIGHT: 67 IN | RESPIRATION RATE: 20 BRPM | TEMPERATURE: 98.2 F | BODY MASS INDEX: 30.75 KG/M2 | SYSTOLIC BLOOD PRESSURE: 126 MMHG | DIASTOLIC BLOOD PRESSURE: 84 MMHG | WEIGHT: 195.9 LBS | OXYGEN SATURATION: 100 % | HEART RATE: 85 BPM

## 2023-03-28 DIAGNOSIS — R19.7 DIARRHEA, UNSPECIFIED TYPE: ICD-10-CM

## 2023-03-28 DIAGNOSIS — J30.1 NON-SEASONAL ALLERGIC RHINITIS DUE TO POLLEN: ICD-10-CM

## 2023-03-28 DIAGNOSIS — F90.0 ATTENTION-DEFICIT HYPERACTIVITY DISORDER, PREDOMINANTLY INATTENTIVE TYPE: Primary | ICD-10-CM

## 2023-03-28 DIAGNOSIS — L30.1 DYSHIDROTIC ECZEMA: ICD-10-CM

## 2023-03-28 PROCEDURE — 99214 OFFICE O/P EST MOD 30 MIN: CPT | Performed by: FAMILY MEDICINE

## 2023-03-28 RX ORDER — DEXTROAMPHETAMINE SACCHARATE, AMPHETAMINE ASPARTATE, DEXTROAMPHETAMINE SULFATE AND AMPHETAMINE SULFATE 5; 5; 5; 5 MG/1; MG/1; MG/1; MG/1
20 TABLET ORAL 2 TIMES DAILY
Qty: 60 TABLET | Refills: 0 | Status: SHIPPED | OUTPATIENT
Start: 2023-04-17 | End: 2023-05-16

## 2023-03-28 RX ORDER — CLOBETASOL PROPIONATE 0.5 MG/G
OINTMENT TOPICAL 2 TIMES DAILY
Qty: 30 G | Refills: 2 | Status: SHIPPED | OUTPATIENT
Start: 2023-03-28 | End: 2023-12-21

## 2023-03-28 RX ORDER — FLUTICASONE PROPIONATE 50 MCG
2 SPRAY, SUSPENSION (ML) NASAL DAILY
Qty: 16 G | Refills: 11 | Status: CANCELLED | OUTPATIENT
Start: 2023-03-28

## 2023-03-28 NOTE — PROGRESS NOTES
"  Assessment & Plan     Attention-deficit hyperactivity disorder, predominantly inattentive type  Doing well, continue same dose   Refill for 2 months   Follow up in 4 months   PDMP queried, no concerns     Non-seasonal allergic rhinitis due to pollen  Controlled, continue with current medications    Diarrhea  Given presence of symptoms for 6 months and no change in diet GI referral placed    Dyshidrotic eczema  Clobetasol ointment twice daily for no more than 2 weeks at a time.                   Torres Hill MD  [facility]    Kevin Peñaloza is a 42 year old, presenting for the following health issues:  A.D.H.D (ADHD follow up, refill meds)  No flowsheet data found.  History of Present Illness       Reason for visit:  Medication Check up    She eats 2-3 servings of fruits and vegetables daily.She consumes 2 sweetened beverage(s) daily.She exercises with enough effort to increase her heart rate 10 to 19 minutes per day.  She exercises with enough effort to increase her heart rate 3 or less days per week.   She is taking medications regularly.       Medication Followup of ADHD    Taking Medication as prescribed: yes    Side Effects:  None    Medication Helping Symptoms:  yes    She has concerns about persistent frequent stools.  This has been present over the last 6 months.  She has not had any changes in her diet.  She has occasional cramping and occasional bleeding.  She complains of small clear fluid-filled blisters on the fingers which are very pruritic and progressed to excoriated areas.  She has used her mother's clobetasol ointment with some success.      Review of Systems   Constitutional, HEENT, cardiovascular, pulmonary, gi and gu systems are negative, except as otherwise noted.      Objective    /84 (BP Location: Right arm, Patient Position: Sitting, Cuff Size: Adult Large)   Pulse 85   Temp 98.2  F (36.8  C) (Tympanic)   Resp 20   Ht 1.702 m (5' 7\")   Wt " 88.9 kg (195 lb 14.4 oz)   LMP 03/02/2023 (Approximate)   SpO2 100%   BMI 30.68 kg/m    Body mass index is 30.68 kg/m .  Physical Exam   Alert, oriented, no acute distress  Normal heart rate  Nonlabored breathing  Exam the hands reveals excoriations, crusting mainly between the fingers.  Hands are dry

## 2023-04-05 ENCOUNTER — MYC MEDICAL ADVICE (OUTPATIENT)
Dept: FAMILY MEDICINE | Facility: CLINIC | Age: 43
End: 2023-04-05
Payer: COMMERCIAL

## 2023-04-05 NOTE — TELEPHONE ENCOUNTER
Referrals team,     Can you please send this referral to [facility] again?    Thank you,  Jennifer Martínez, CMA

## 2023-04-05 NOTE — TELEPHONE ENCOUNTER
Digital Medicine: Health  Follow-Up    The history is provided by the patient.     Follow Up  Follow-up reason(s): reading review      Readings are missing. Mr. Mike will start to check his BP and BG more regularly.         Diet:       Patient states that after seeing hepatology, weight loss and healthy eating would help with the progression of fatty liver (and other things). Mr. Mike states that his weight has been staying within a range of 174-180 lbs. His ideal weight is to be about 160lbs. He is state any intention to start with weight loss currently. Will continue to follow up on goal and provided support, encouragement, and education for promoting weight loss.       SDOH - Deferred    INTERVENTION(S)  encouragement/support    PLAN  additional monitoring needed        Topic    Hemoglobin A1C      Patient had labs drawn at [facility] 10/17/19.     Last 5 Patient Entered Readings                                      Current 30 Day Average: 147/75     Recent Readings 10/11/2019 10/8/2019 9/30/2019 9/16/2019 9/5/2019    SBP (mmHg) 143 159 140 149 158    DBP (mmHg) 74 80 70 102 79    Pulse 55 61 64 60 67        Last 6 Patient Entered Readings                                          Most Recent A1c: 9.7% on 7/8/2019  (Goal: 8%)     Recent Readings 10/8/2019 9/30/2019 9/19/2019 9/5/2019 9/4/2019    Blood Glucose (mg/dL) 202 240 215 282 167               See MyChart from Patient needing PCP review.  Please respond directly to patient, if at all able.    AB Salgado  [facility]

## 2023-04-17 ENCOUNTER — TRANSFERRED RECORDS (OUTPATIENT)
Dept: HEALTH INFORMATION MANAGEMENT | Facility: CLINIC | Age: 43
End: 2023-04-17
Payer: COMMERCIAL

## 2023-04-17 DIAGNOSIS — J30.9 ALLERGIC RHINITIS: ICD-10-CM

## 2023-04-17 LAB — TSH SERPL-ACNC: 2.34 UIU/ML (ref 0.45–4.5)

## 2023-04-17 RX ORDER — FLUTICASONE PROPIONATE 50 MCG
2 SPRAY, SUSPENSION (ML) NASAL DAILY
Qty: 16 G | Refills: 0 | Status: SHIPPED | OUTPATIENT
Start: 2023-04-17 | End: 2023-05-15

## 2023-04-17 NOTE — TELEPHONE ENCOUNTER
"    Last office visit: 3/28/2023   Last RX: 3/15/2023   #16g     R-0        Future Appointments 4/17/2023 - 10/14/2023    None          Requested Prescriptions   Pending Prescriptions Disp Refills     fluticasone (FLONASE) 50 MCG/ACT nasal spray [Pharmacy Med Name: Fluticasone Propionate Nasal Suspension 50 MCG/ACT] 16 g 0     Sig: Spray 2 sprays into both nostrils daily       Nasal Allergy Protocol Passed - 4/17/2023  4:12 PM        Passed - Patient is age 12 or older        Passed - Recent (12 mo) or future (30 days) visit within the authorizing provider's specialty     Patient has had an office visit with the authorizing provider or a provider within the authorizing providers department within the previous 12 mos or has a future within next 30 days. See \"Patient Info\" tab in inbasket, or \"Choose Columns\" in Meds & Orders section of the refill encounter.              Passed - Medication is active on med list                   "

## 2023-04-24 NOTE — TELEPHONE ENCOUNTER
---------------------  From: Mamadou BERGER, Noemy   To: NATALIE HASTINGS    Sent: 8/8/2019 3:51:25 PM CDT  Subject: medication     Genesis Peñaloza,    I have sent your portal message regarding your medication forward to Dr. Hill. He will back in clinic on 8/12/19 and will receive it then. Good to hear you are doing well. Please feel free to contact us if you need anything prior to 8/12/19.    Have a great day,    Noemy BAXTER RN   Procedure Text: The treatment areas were cleansed. Kybella was administered using the parameters mentioned above.

## 2023-05-03 ENCOUNTER — TRANSFERRED RECORDS (OUTPATIENT)
Dept: HEALTH INFORMATION MANAGEMENT | Facility: CLINIC | Age: 43
End: 2023-05-03
Payer: COMMERCIAL

## 2023-05-15 DIAGNOSIS — F90.0 ATTENTION-DEFICIT HYPERACTIVITY DISORDER, PREDOMINANTLY INATTENTIVE TYPE: ICD-10-CM

## 2023-05-15 DIAGNOSIS — J30.9 ALLERGIC RHINITIS: ICD-10-CM

## 2023-05-15 RX ORDER — FLUTICASONE PROPIONATE 50 MCG
2 SPRAY, SUSPENSION (ML) NASAL DAILY
Qty: 16 G | Refills: 0 | Status: SHIPPED | OUTPATIENT
Start: 2023-05-15 | End: 2023-06-16

## 2023-05-15 NOTE — TELEPHONE ENCOUNTER
"    Last office visit: 3/28/2023     Future Appointments 5/15/2023 - 11/11/2023    None          Requested Prescriptions   Pending Prescriptions Disp Refills     fluticasone (FLONASE) 50 MCG/ACT nasal spray [Pharmacy Med Name: Fluticasone Propionate Nasal Suspension 50 MCG/ACT] 16 g 0     Sig: Spray 2 sprays into both nostrils daily       Nasal Allergy Protocol Passed - 5/15/2023  4:17 PM        Passed - Patient is age 12 or older        Passed - Recent (12 mo) or future (30 days) visit within the authorizing provider's specialty     Patient has had an office visit with the authorizing provider or a provider within the authorizing providers department within the previous 12 mos or has a future within next 30 days. See \"Patient Info\" tab in inbasket, or \"Choose Columns\" in Meds & Orders section of the refill encounter.              Passed - Medication is active on med list                   "

## 2023-05-16 RX ORDER — AZELASTINE HYDROCHLORIDE 137 UG/1
SPRAY, METERED NASAL
Qty: 30 ML | Refills: 0 | Status: SHIPPED | OUTPATIENT
Start: 2023-05-16 | End: 2023-06-19

## 2023-05-16 RX ORDER — DEXTROAMPHETAMINE SACCHARATE, AMPHETAMINE ASPARTATE, DEXTROAMPHETAMINE SULFATE AND AMPHETAMINE SULFATE 5; 5; 5; 5 MG/1; MG/1; MG/1; MG/1
20 TABLET ORAL 2 TIMES DAILY
Qty: 60 TABLET | Refills: 0 | Status: SHIPPED | OUTPATIENT
Start: 2023-05-16 | End: 2023-06-19

## 2023-06-16 ENCOUNTER — MYC REFILL (OUTPATIENT)
Dept: FAMILY MEDICINE | Facility: CLINIC | Age: 43
End: 2023-06-16
Payer: COMMERCIAL

## 2023-06-16 DIAGNOSIS — J30.9 ALLERGIC RHINITIS: ICD-10-CM

## 2023-06-17 DIAGNOSIS — F90.0 ATTENTION-DEFICIT HYPERACTIVITY DISORDER, PREDOMINANTLY INATTENTIVE TYPE: ICD-10-CM

## 2023-06-17 DIAGNOSIS — J30.9 ALLERGIC RHINITIS: ICD-10-CM

## 2023-06-19 RX ORDER — AZELASTINE HYDROCHLORIDE 137 UG/1
SPRAY, METERED NASAL
Qty: 30 ML | Refills: 0 | Status: SHIPPED | OUTPATIENT
Start: 2023-06-19 | End: 2023-08-24

## 2023-06-19 RX ORDER — DEXTROAMPHETAMINE SACCHARATE, AMPHETAMINE ASPARTATE, DEXTROAMPHETAMINE SULFATE AND AMPHETAMINE SULFATE 5; 5; 5; 5 MG/1; MG/1; MG/1; MG/1
20 TABLET ORAL 2 TIMES DAILY
Qty: 60 TABLET | Refills: 0 | Status: SHIPPED | OUTPATIENT
Start: 2023-06-19 | End: 2023-10-23

## 2023-06-19 RX ORDER — FLUTICASONE PROPIONATE 50 MCG
2 SPRAY, SUSPENSION (ML) NASAL DAILY
Qty: 16 G | Refills: 0 | OUTPATIENT
Start: 2023-06-19

## 2023-06-19 RX ORDER — FLUTICASONE PROPIONATE 50 MCG
2 SPRAY, SUSPENSION (ML) NASAL DAILY
Qty: 16 G | Refills: 1 | Status: SHIPPED | OUTPATIENT
Start: 2023-06-19 | End: 2023-11-21

## 2023-06-19 NOTE — TELEPHONE ENCOUNTER
Prescription approved per [facility] Refill Protocol.    Last Written Prescription Date: 5/15/23  Last Fill Quantity: 16g,  # refills: 0   Last office visit: 3/28/2023

## 2023-06-19 NOTE — TELEPHONE ENCOUNTER
This refill request is a duplicate request, previously received or sent.  Sent denial notification to pharmacy.  Filled 6/19/23  María DIEGO RN  [facility]

## 2023-07-10 ENCOUNTER — HOSPITAL ENCOUNTER (OUTPATIENT)
Dept: MAMMOGRAPHY | Facility: CLINIC | Age: 43
Discharge: HOME | End: 2023-07-10
Attending: FAMILY MEDICINE | Admitting: FAMILY MEDICINE
Payer: COMMERCIAL

## 2023-07-10 DIAGNOSIS — Z12.31 VISIT FOR SCREENING MAMMOGRAM: ICD-10-CM

## 2023-07-10 PROCEDURE — 77067 SCR MAMMO BI INCL CAD: CPT

## 2023-07-19 ENCOUNTER — OFFICE VISIT (OUTPATIENT)
Dept: FAMILY MEDICINE | Facility: CLINIC | Age: 43
End: 2023-07-19
Payer: COMMERCIAL

## 2023-07-19 VITALS
RESPIRATION RATE: 16 BRPM | OXYGEN SATURATION: 100 % | TEMPERATURE: 98.1 F | HEIGHT: 67 IN | HEART RATE: 97 BPM | DIASTOLIC BLOOD PRESSURE: 85 MMHG | SYSTOLIC BLOOD PRESSURE: 137 MMHG | BODY MASS INDEX: 30.23 KG/M2 | WEIGHT: 192.6 LBS

## 2023-07-19 DIAGNOSIS — D12.8 ADENOMATOUS RECTAL POLYP: ICD-10-CM

## 2023-07-19 DIAGNOSIS — Z01.818 PREOPERATIVE EXAMINATION: Primary | ICD-10-CM

## 2023-07-19 DIAGNOSIS — F90.0 ATTENTION-DEFICIT HYPERACTIVITY DISORDER, PREDOMINANTLY INATTENTIVE TYPE: ICD-10-CM

## 2023-07-19 PROCEDURE — 99214 OFFICE O/P EST MOD 30 MIN: CPT | Performed by: FAMILY MEDICINE

## 2023-07-19 RX ORDER — DEXTROAMPHETAMINE SACCHARATE, AMPHETAMINE ASPARTATE, DEXTROAMPHETAMINE SULFATE AND AMPHETAMINE SULFATE 5; 5; 5; 5 MG/1; MG/1; MG/1; MG/1
20 TABLET ORAL 2 TIMES DAILY
Qty: 60 TABLET | Refills: 0 | Status: CANCELLED | OUTPATIENT
Start: 2023-07-19

## 2023-07-19 RX ORDER — DEXTROAMPHETAMINE SACCHARATE, AMPHETAMINE ASPARTATE, DEXTROAMPHETAMINE SULFATE AND AMPHETAMINE SULFATE 5; 5; 5; 5 MG/1; MG/1; MG/1; MG/1
20 TABLET ORAL 2 TIMES DAILY
Qty: 60 TABLET | Refills: 0 | Status: SHIPPED | OUTPATIENT
Start: 2023-09-19 | End: 2023-10-19

## 2023-07-19 RX ORDER — DEXTROAMPHETAMINE SACCHARATE, AMPHETAMINE ASPARTATE, DEXTROAMPHETAMINE SULFATE AND AMPHETAMINE SULFATE 5; 5; 5; 5 MG/1; MG/1; MG/1; MG/1
20 TABLET ORAL 2 TIMES DAILY
Qty: 60 TABLET | Refills: 0 | Status: SHIPPED | OUTPATIENT
Start: 2023-08-19 | End: 2023-09-18

## 2023-07-19 RX ORDER — DEXTROAMPHETAMINE SACCHARATE, AMPHETAMINE ASPARTATE, DEXTROAMPHETAMINE SULFATE AND AMPHETAMINE SULFATE 5; 5; 5; 5 MG/1; MG/1; MG/1; MG/1
20 TABLET ORAL 2 TIMES DAILY
Qty: 60 TABLET | Refills: 0 | Status: SHIPPED | OUTPATIENT
Start: 2023-07-19 | End: 2023-08-18

## 2023-07-19 ASSESSMENT — PATIENT HEALTH QUESTIONNAIRE - PHQ9
SUM OF ALL RESPONSES TO PHQ QUESTIONS 1-9: 0
10. IF YOU CHECKED OFF ANY PROBLEMS, HOW DIFFICULT HAVE THESE PROBLEMS MADE IT FOR YOU TO DO YOUR WORK, TAKE CARE OF THINGS AT HOME, OR GET ALONG WITH OTHER PEOPLE: NOT DIFFICULT AT ALL
SUM OF ALL RESPONSES TO PHQ QUESTIONS 1-9: 0

## 2023-07-19 NOTE — PROGRESS NOTES
[address] [address]  Phone: [phone]  Fax: [phone]  Primary Provider: Torres Hill  Pre-op Performing Provider: TORRES HILL      PREOPERATIVE EVALUATION:  Today's date: 7/19/2023    Anabela Bartholomew is a 42 year old female who presents for a preoperative evaluation.      7/19/2023     2:36 PM   Additional Questions   Roomed by Cecile BROWN CMA   Accompanied by Self     Surgical Information:  Surgery/Procedure: excision of rectal polyp and anal papilla  Surgery Location: [facility]  Surgeon: Dr. Clara Angeles  Surgery Date: 08/09/2023  Time of Surgery: TBD  Where patient plans to recover: At home with family  Fax number for surgical facility: Note does not need to be faxed, will be available electronically in Epic.    Assessment & Plan     The proposed surgical procedure is considered LOW risk.    Preoperative examination      Adenomatous rectal polyp      Attention-deficit hyperactivity disorder, predominantly inattentive type  Doing well, continue same dose   Refill for 3 months   Follow up in 4 months   PDMP queried, no concerns   - amphetamine-dextroamphetamine (ADDERALL) 20 MG tablet; Take 1 tablet (20 mg) by mouth 2 times daily for 30 days  - amphetamine-dextroamphetamine (ADDERALL) 20 MG tablet; Take 1 tablet (20 mg) by mouth 2 times daily for 30 days  - amphetamine-dextroamphetamine (ADDERALL) 20 MG tablet; Take 1 tablet (20 mg) by mouth 2 times daily for 30 days        - No identified additional risk factors other than previously addressed    Antiplatelet or Anticoagulation Medication Instructions:   - Patient is on no antiplatelet or anticoagulation medications.    Additional Medication Instructions:  Patient is to take all scheduled medications on the day of surgery    RECOMMENDATION:  APPROVAL GIVEN to proceed with proposed procedure, without further diagnostic evaluation.            Subjective       HPI  related to upcoming procedure: resection of rectal polyp        7/13/2023     7:47 AM   Preop Questions   1. Have you ever had a heart attack or stroke? No   2. Have you ever had surgery on your heart or blood vessels, such as a stent placement, a coronary artery bypass, or surgery on an artery in your head, neck, heart, or legs? No   3. Do you have chest pain with activity? No   4. Do you have a history of  heart failure? No   5. Do you currently have a cold, bronchitis or symptoms of other infection? No   6. Do you have a cough, shortness of breath, or wheezing? No   7. Do you or anyone in your family have previous history of blood clots? No   8. Do you or does anyone in your family have a serious bleeding problem such as prolonged bleeding following surgeries or cuts? No   9. Have you ever had problems with anemia or been told to take iron pills? No   10. Have you had any abnormal blood loss such as black, tarry or bloody stools, or abnormal vaginal bleeding? No   11. Have you ever had a blood transfusion? No   12. Are you willing to have a blood transfusion if it is medically needed before, during, or after your surgery? Yes   13. Have you or any of your relatives ever had problems with anesthesia? No   14. Do you have sleep apnea, excessive snoring or daytime drowsiness? YES -    14a. Do you have a CPAP machine? No   15. Do you have any artifical heart valves or other implanted medical devices like a pacemaker, defibrillator, or continuous glucose monitor? No   16. Do you have artificial joints? No   17. Are you allergic to latex? No   18. Is there any chance that you may be pregnant? No       Health Care Directive:  Patient does not have a Health Care Directive or Living Will: Discussed advance care planning with patient; information given to patient to review.    Preoperative Review of :   reviewed - controlled substances reflected in medication list.          Review of Systems  CONSTITUTIONAL: NEGATIVE  for fever, chills, change in weight  INTEGUMENTARY/SKIN: NEGATIVE for worrisome rashes, moles or lesions  EYES: NEGATIVE for vision changes or irritation  ENT/MOUTH: NEGATIVE for ear, mouth and throat problems  RESP: NEGATIVE for significant cough or SOB  CV: NEGATIVE for chest pain, palpitations or peripheral edema  GI: NEGATIVE for nausea, abdominal pain, heartburn, or change in bowel habits  : NEGATIVE for frequency, dysuria, or hematuria  MUSCULOSKELETAL: NEGATIVE for significant arthralgias or myalgia  NEURO: NEGATIVE for weakness, dizziness or paresthesias  ENDOCRINE: NEGATIVE for temperature intolerance, skin/hair changes  HEME: NEGATIVE for bleeding problems  PSYCHIATRIC: NEGATIVE for changes in mood or affect    Patient Active Problem List    Diagnosis Date Noted     Attention deficit hyperactivity disorder (ADHD), predominantly inattentive type 03/02/2022     Priority: Medium     Anxiety Disorder NOS      Priority: Medium     Created by Conversion  Replacement Utility updated for latest IMO load         Chronic rhinitis      Priority: Medium     Created by Conversion  Replacement Utility updated for latest IMO load         Onychomycosis of left great toe 05/11/2016     Priority: Medium     Class 1 obesity due to excess calories without serious comorbidity with body mass index (BMI) of 30.0 to 30.9 in adult      Priority: Medium     Created by Conversion         Recurrent Major Depression In Full Remission      Priority: Medium     Created by Conversion          Past Medical History:   Diagnosis Date     Depressive disorder teenage years    in full remission     Past Surgical History:   Procedure Laterality Date     COLONOSCOPY  05/03/2023    [facility]; Dr. Too Rose; indication:  diarrhea; TA x5 in cucum and ascending colon size 2-3mm; sessile serrated adenoma x2 in descending abnd sigmoid colon size 4-5mm; TA consistent with adv adenoma in rectum size 16-20mm; repeat in 1 yr      ENT SURGERY  1985     EYE SURGERY      Lasix in 1 eye     Current Outpatient Medications   Medication Sig Dispense Refill     amphetamine-dextroamphetamine (ADDERALL) 20 MG tablet Take 1 tablet (20 mg) by mouth 2 times daily 60 tablet 0     Azelastine HCl 137 MCG/SPRAY SOLN INSERT 2 SQUIRTS IN EACH NOSTRIL TWICE DAILY 30 mL 0     cetirizine (ZYRTEC) 10 MG tablet Take 10 mg by mouth as needed for allergies       clobetasol (TEMOVATE) 0.05 % external ointment Apply topically 2 times daily 30 g 2     fluticasone (FLONASE) 50 MCG/ACT nasal spray Spray 2 sprays into both nostrils daily 16 g 1     lactobacillus rhamnosus (GG) (CULTURELL) capsule Take 1 capsule by mouth 2 times daily       Multiple Vitamins-Minerals (MULTIVITAMIN ADULT, MINERALS, PO) Take 1 tablet by mouth daily         Allergies   Allergen Reactions     Penicillins Hives     Sulfa (Sulfonamide Antibiotics) [Sulfa Antibiotics] Hives        Social History     Tobacco Use     Smoking status: Some Days     Packs/day: 0.00     Years: 20.00     Pack years: 0.00     Types: Cigarettes     Start date: 1999     Last attempt to quit: 3/22/2022     Years since quittin.3     Passive exposure: Yes     Smokeless tobacco: Never     Tobacco comments:     1 cigarettes a week or less.   Substance Use Topics     Alcohol use: Yes     Comment: casual/occasional     Family History   Problem Relation Age of Onset     Cancer Mother      Hyperlipidemia Mother      Depression Mother      Obesity Mother      Sleep Apnea Mother      Hyperlipidemia Father      Diabetes Father      Hypertension Father      Depression Father      Skin Cancer Father      Heart Disease Father      Migraines Sister      Anxiety Disorder Sister      Depression Sister      Obesity Sister      Breast Cancer Other      History   Drug Use Unknown         Objective     There were no vitals taken for this visit.    Physical Exam    GENERAL APPEARANCE: healthy, alert and no distress     EYES: EOMI,  PERRL     HENT: ear canals and TM's normal and nose and mouth without ulcers or lesions     NECK: no adenopathy, no asymmetry, masses, or scars and thyroid normal to palpation     RESP: lungs clear to auscultation - no rales, rhonchi or wheezes     CV: regular rates and rhythm, normal S1 S2, no S3 or S4 and no murmur, click or rub     ABDOMEN:  soft, nontender, no HSM or masses and bowel sounds normal     MS: extremities normal- no gross deformities noted, no evidence of inflammation in joints, FROM in all extremities.     SKIN: no suspicious lesions or rashes     NEURO: Normal strength and tone, sensory exam grossly normal, mentation intact and speech normal     PSYCH: mentation appears normal. and affect normal/bright     LYMPHATICS: No cervical adenopathy    Recent Labs   Lab Test 11/08/22  1214   HGB 12.8           Diagnostics:  No labs were ordered during this visit.   No EKG required for low risk surgery (cataract, skin procedure, breast biopsy, etc).    Revised Cardiac Risk Index (RCRI):  The patient has the following serious cardiovascular risks for perioperative complications:   - No serious cardiac risks = 0 points     RCRI Interpretation: 0 points: Class I (very low risk - 0.4% complication rate)           Signed Electronically by: Torres Hill MD  Copy of this evaluation report is provided to requesting physician.      Answers for HPI/ROS submitted by the patient on 7/19/2023  If you checked off any problems, how difficult have these problems made it for you to do your work, take care of things at home, or get along with other people?: Not difficult at all  PHQ9 TOTAL SCORE: 0

## 2023-08-24 DIAGNOSIS — J30.9 ALLERGIC RHINITIS: ICD-10-CM

## 2023-08-24 RX ORDER — AZELASTINE HYDROCHLORIDE 137 UG/1
SPRAY, METERED NASAL
Qty: 30 ML | Refills: 0 | Status: SHIPPED | OUTPATIENT
Start: 2023-08-24 | End: 2023-11-21

## 2023-08-24 NOTE — TELEPHONE ENCOUNTER
"Last Written Prescription Date:  6/19/23  Last Fill Quantity: 30 ml,  # refills: 0   Last office visit provider:  7/19/23     Requested Prescriptions   Pending Prescriptions Disp Refills    Azelastine HCl 137 MCG/SPRAY SOLN [Pharmacy Med Name: Azelastine HCl Nasal Solution 137 MCG/SPRAY] 30 mL 0     Sig: INSERT 2 sprays IN EACH NOSTRILTWICE DAILY       Antihistamines Protocol Passed - 8/24/2023  9:51 AM        Passed - Patient is 3-64 years of age     Apply weight-based dosing for peds patients age 3 - 12 years of age.    Forward request to provider for patients under the age of 3 or over the age of 64.          Passed - Recent (12 mo) or future (30 days) visit within the authorizing provider's specialty     Patient has had an office visit with the authorizing provider or a provider within the authorizing providers department within the previous 12 mos or has a future within next 30 days. See \"Patient Info\" tab in inbasket, or \"Choose Columns\" in Meds & Orders section of the refill encounter.              Passed - Medication is active on med list       Nasal Allergy Protocol Passed - 8/24/2023  9:51 AM        Passed - Patient is age 12 or older        Passed - Recent (12 mo) or future (30 days) visit within the authorizing provider's specialty     Patient has had an office visit with the authorizing provider or a provider within the authorizing providers department within the previous 12 mos or has a future within next 30 days. See \"Patient Info\" tab in inbasket, or \"Choose Columns\" in Meds & Orders section of the refill encounter.              Passed - Medication is active on med list             Vonda Friend RN 08/24/23 2:03 PM  "

## 2023-10-22 DIAGNOSIS — F90.0 ATTENTION-DEFICIT HYPERACTIVITY DISORDER, PREDOMINANTLY INATTENTIVE TYPE: ICD-10-CM

## 2023-10-23 RX ORDER — DEXTROAMPHETAMINE SACCHARATE, AMPHETAMINE ASPARTATE, DEXTROAMPHETAMINE SULFATE AND AMPHETAMINE SULFATE 5; 5; 5; 5 MG/1; MG/1; MG/1; MG/1
20 TABLET ORAL 2 TIMES DAILY
Qty: 60 TABLET | Refills: 0 | Status: SHIPPED | OUTPATIENT
Start: 2023-10-23 | End: 2023-11-21

## 2023-11-21 ENCOUNTER — MYC REFILL (OUTPATIENT)
Dept: FAMILY MEDICINE | Facility: CLINIC | Age: 43
End: 2023-11-21
Payer: COMMERCIAL

## 2023-11-21 DIAGNOSIS — J30.9 ALLERGIC RHINITIS: ICD-10-CM

## 2023-11-21 DIAGNOSIS — J30.81 ALLERGIC RHINITIS DUE TO ANIMAL HAIR AND DANDER: ICD-10-CM

## 2023-11-21 DIAGNOSIS — F90.0 ATTENTION-DEFICIT HYPERACTIVITY DISORDER, PREDOMINANTLY INATTENTIVE TYPE: ICD-10-CM

## 2023-11-21 RX ORDER — AZELASTINE HYDROCHLORIDE 137 UG/1
2 SPRAY, METERED NASAL 2 TIMES DAILY
Qty: 30 ML | Refills: 3 | Status: SHIPPED | OUTPATIENT
Start: 2023-11-21 | End: 2024-05-20

## 2023-11-21 RX ORDER — FLUTICASONE PROPIONATE 50 MCG
2 SPRAY, SUSPENSION (ML) NASAL DAILY
Qty: 48 G | Refills: 0 | Status: SHIPPED | OUTPATIENT
Start: 2023-11-21 | End: 2024-06-19

## 2023-11-21 RX ORDER — DEXTROAMPHETAMINE SACCHARATE, AMPHETAMINE ASPARTATE, DEXTROAMPHETAMINE SULFATE AND AMPHETAMINE SULFATE 5; 5; 5; 5 MG/1; MG/1; MG/1; MG/1
20 TABLET ORAL 2 TIMES DAILY
Qty: 60 TABLET | Refills: 0 | Status: SHIPPED | OUTPATIENT
Start: 2023-11-21 | End: 2023-12-21

## 2023-11-21 NOTE — TELEPHONE ENCOUNTER
"  Last office visit: 7/19/2023     Future Appointments 11/21/2023 - 5/19/2024      None            Requested Prescriptions   Pending Prescriptions Disp Refills    Azelastine HCl 137 MCG/SPRAY SOLN 30 mL 0       Antihistamines Protocol Passed - 11/21/2023 10:32 AM        Passed - Patient is 3-64 years of age     Apply weight-based dosing for peds patients age 3 - 12 years of age.    Forward request to provider for patients under the age of 3 or over the age of 64.          Passed - Recent (12 mo) or future (30 days) visit within the authorizing provider's specialty     Patient has had an office visit with the authorizing provider or a provider within the authorizing providers department within the previous 12 mos or has a future within next 30 days. See \"Patient Info\" tab in inbasket, or \"Choose Columns\" in Meds & Orders section of the refill encounter.              Passed - Medication is active on med list       Nasal Allergy Protocol Passed - 11/21/2023 10:32 AM        Passed - Patient is age 12 or older        Passed - Recent (12 mo) or future (30 days) visit within the authorizing provider's specialty     Patient has had an office visit with the authorizing provider or a provider within the authorizing providers department within the previous 12 mos or has a future within next 30 days. See \"Patient Info\" tab in inbasket, or \"Choose Columns\" in Meds & Orders section of the refill encounter.              Passed - Medication is active on med list          amphetamine-dextroamphetamine (ADDERALL) 20 MG tablet 60 tablet 0     Sig: Take 1 tablet (20 mg) by mouth 2 times daily       There is no refill protocol information for this order                "

## 2023-12-21 ENCOUNTER — MYC REFILL (OUTPATIENT)
Dept: FAMILY MEDICINE | Facility: CLINIC | Age: 43
End: 2023-12-21
Payer: COMMERCIAL

## 2023-12-21 DIAGNOSIS — L30.1 DYSHIDROTIC ECZEMA: ICD-10-CM

## 2023-12-21 DIAGNOSIS — F90.0 ATTENTION-DEFICIT HYPERACTIVITY DISORDER, PREDOMINANTLY INATTENTIVE TYPE: ICD-10-CM

## 2023-12-21 NOTE — TELEPHONE ENCOUNTER
Last office visit: 7/19/2023     Future Appointments 12/21/2023 - 6/18/2024      None            Requested Prescriptions   Pending Prescriptions Disp Refills    clobetasol (TEMOVATE) 0.05 % external ointment 30 g 2     Sig: Apply topically 2 times daily       There is no refill protocol information for this order       amphetamine-dextroamphetamine (ADDERALL) 20 MG tablet 60 tablet 0     Sig: Take 1 tablet (20 mg) by mouth 2 times daily       There is no refill protocol information for this order

## 2023-12-22 RX ORDER — CLOBETASOL PROPIONATE 0.5 MG/G
OINTMENT TOPICAL 2 TIMES DAILY
Qty: 30 G | Refills: 2 | Status: SHIPPED | OUTPATIENT
Start: 2023-12-22 | End: 2024-08-20

## 2023-12-22 RX ORDER — DEXTROAMPHETAMINE SACCHARATE, AMPHETAMINE ASPARTATE, DEXTROAMPHETAMINE SULFATE AND AMPHETAMINE SULFATE 5; 5; 5; 5 MG/1; MG/1; MG/1; MG/1
20 TABLET ORAL 2 TIMES DAILY
Qty: 60 TABLET | Refills: 0 | Status: SHIPPED | OUTPATIENT
Start: 2023-12-22 | End: 2024-01-20

## 2024-01-20 ENCOUNTER — MYC REFILL (OUTPATIENT)
Dept: FAMILY MEDICINE | Facility: CLINIC | Age: 44
End: 2024-01-20
Payer: COMMERCIAL

## 2024-01-20 DIAGNOSIS — F90.0 ATTENTION-DEFICIT HYPERACTIVITY DISORDER, PREDOMINANTLY INATTENTIVE TYPE: ICD-10-CM

## 2024-01-22 RX ORDER — DEXTROAMPHETAMINE SACCHARATE, AMPHETAMINE ASPARTATE, DEXTROAMPHETAMINE SULFATE AND AMPHETAMINE SULFATE 5; 5; 5; 5 MG/1; MG/1; MG/1; MG/1
20 TABLET ORAL 2 TIMES DAILY
Qty: 60 TABLET | Refills: 0 | Status: SHIPPED | OUTPATIENT
Start: 2024-01-22

## 2024-01-22 NOTE — TELEPHONE ENCOUNTER
Future Appointments 1/22/2024 - 7/20/2024        Date Visit Type Length Department Provider     2/1/2024  3:00 PM OFFICE VISIT 20 min [facility] FP/IM/Torres Smith MD    Location Instructions:     [facility] is located at [address] [place] [address], one block north of [facility] and the [facility]. The clinic shares a building with the [facility] grocery store; use the clinic s parking lot and entrance on the building s south side.

## 2024-01-22 NOTE — TELEPHONE ENCOUNTER
Routing refill request to provider for review/approval because:  Drug not on the [facility] refill protocol   Due for a med check  Last Written Prescription Date: 12/22/23  Last Fill Quantity: 60,  # refills: 0   Last office visit: 7/19/2023

## 2024-02-01 ENCOUNTER — OFFICE VISIT (OUTPATIENT)
Dept: FAMILY MEDICINE | Facility: CLINIC | Age: 44
End: 2024-02-01
Payer: COMMERCIAL

## 2024-02-01 VITALS
HEIGHT: 67 IN | TEMPERATURE: 99.2 F | BODY MASS INDEX: 29.41 KG/M2 | OXYGEN SATURATION: 100 % | DIASTOLIC BLOOD PRESSURE: 80 MMHG | SYSTOLIC BLOOD PRESSURE: 124 MMHG | WEIGHT: 187.4 LBS | RESPIRATION RATE: 16 BRPM | HEART RATE: 99 BPM

## 2024-02-01 DIAGNOSIS — F90.0 ATTENTION DEFICIT HYPERACTIVITY DISORDER (ADHD), PREDOMINANTLY INATTENTIVE TYPE: Primary | ICD-10-CM

## 2024-02-01 PROCEDURE — 99213 OFFICE O/P EST LOW 20 MIN: CPT | Performed by: FAMILY MEDICINE

## 2024-02-01 RX ORDER — DEXTROAMPHETAMINE SACCHARATE, AMPHETAMINE ASPARTATE, DEXTROAMPHETAMINE SULFATE AND AMPHETAMINE SULFATE 5; 5; 5; 5 MG/1; MG/1; MG/1; MG/1
20 TABLET ORAL 2 TIMES DAILY
Qty: 60 TABLET | Refills: 0 | Status: SHIPPED | OUTPATIENT
Start: 2024-02-21 | End: 2024-03-20

## 2024-02-01 NOTE — PROGRESS NOTES
"  Assessment & Plan     Attention deficit hyperactivity disorder (ADHD), predominantly inattentive type  Doing well, continue same dose   Refill for 2 months   Follow up in 4 months   PDMP queried, no concerns    - amphetamine-dextroamphetamine (ADDERALL) 20 MG tablet; Take 1 tablet (20 mg) by mouth 2 times daily            Nicotine/Tobacco Cessation  She reports that she has been smoking cigarettes. She started smoking about 24 years ago. She has been exposed to tobacco smoke. She has never used smokeless tobacco.  Nicotine/Tobacco Cessation Plan  Self help information given to patient      BMI  Estimated body mass index is 29.35 kg/m  as calculated from the following:    Height as of this encounter: 1.702 m (5' 7\").    Weight as of this encounter: 85 kg (187 lb 6.4 oz).             Kevin Peñaloza is a 43 year old, presenting for the following health issues:  A.D.H.D (ADHD follow up)        2/1/2024     2:28 PM   Additional Questions   Roomed by Cecile BROWN CMA   Accompanied by Self     Via the Health Maintenance questionnaire, the patient has reported the following services have been completed -Cervical Cancer Screening, this information has been sent to the abstraction team.    History of Present Illness       Reason for visit:  Med Check    She eats 0-1 servings of fruits and vegetables daily.She consumes 2 sweetened beverage(s) daily.She exercises with enough effort to increase her heart rate 10 to 19 minutes per day.  She exercises with enough effort to increase her heart rate 3 or less days per week.   She is taking medications regularly.     Patent presents for attention deficit disorder follow up.  There have been no problems with the medication. The current dose is controlling symptoms. There are no other concerns.                Review of Systems  Constitutional, HEENT, cardiovascular, pulmonary, gi and gu systems are negative, except as otherwise noted.      Objective    /80 (BP Location: Right " "arm, Patient Position: Sitting, Cuff Size: Adult Large)   Pulse 99   Temp 99.2  F (37.3  C) (Tympanic)   Resp 16   Ht 1.702 m (5' 7\")   Wt 85 kg (187 lb 6.4 oz)   SpO2 100%   BMI 29.35 kg/m    Body mass index is 29.35 kg/m .  Physical Exam   General:  Alert and oriented, No acute distress.    Eye: Normal conjunctiva.     HENT:  Oral mucosa is moist.     Neck:  Supple.     Respiratory:  Respirations are non-labored.     Cardiovascular:  Normal rate   Musculoskeletal:  Normal gait.     Psychiatric:  Cooperative, Appropriate mood & affect, Normal judgment.               Signed Electronically by: Torres Hill MD    "

## 2024-02-17 ENCOUNTER — HEALTH MAINTENANCE LETTER (OUTPATIENT)
Age: 44
End: 2024-02-17

## 2024-03-20 ENCOUNTER — MYC REFILL (OUTPATIENT)
Dept: FAMILY MEDICINE | Facility: CLINIC | Age: 44
End: 2024-03-20
Payer: COMMERCIAL

## 2024-03-20 DIAGNOSIS — F90.0 ATTENTION DEFICIT HYPERACTIVITY DISORDER (ADHD), PREDOMINANTLY INATTENTIVE TYPE: ICD-10-CM

## 2024-03-20 RX ORDER — DEXTROAMPHETAMINE SACCHARATE, AMPHETAMINE ASPARTATE, DEXTROAMPHETAMINE SULFATE AND AMPHETAMINE SULFATE 5; 5; 5; 5 MG/1; MG/1; MG/1; MG/1
20 TABLET ORAL 2 TIMES DAILY
Qty: 60 TABLET | Refills: 0 | Status: SHIPPED | OUTPATIENT
Start: 2024-03-20 | End: 2024-04-18

## 2024-04-18 ENCOUNTER — MYC REFILL (OUTPATIENT)
Dept: FAMILY MEDICINE | Facility: CLINIC | Age: 44
End: 2024-04-18
Payer: COMMERCIAL

## 2024-04-18 DIAGNOSIS — F90.0 ATTENTION DEFICIT HYPERACTIVITY DISORDER (ADHD), PREDOMINANTLY INATTENTIVE TYPE: ICD-10-CM

## 2024-04-18 RX ORDER — DEXTROAMPHETAMINE SACCHARATE, AMPHETAMINE ASPARTATE, DEXTROAMPHETAMINE SULFATE AND AMPHETAMINE SULFATE 5; 5; 5; 5 MG/1; MG/1; MG/1; MG/1
20 TABLET ORAL 2 TIMES DAILY
Qty: 60 TABLET | Refills: 0 | Status: SHIPPED | OUTPATIENT
Start: 2024-04-18 | End: 2024-05-18

## 2024-04-18 NOTE — TELEPHONE ENCOUNTER
Last office visit: 2/1/2024     Future Appointments 4/18/2024 - 10/15/2024      None            Requested Prescriptions   Pending Prescriptions Disp Refills    amphetamine-dextroamphetamine (ADDERALL) 20 MG tablet 60 tablet 0     Sig: Take 1 tablet (20 mg) by mouth 2 times daily       There is no refill protocol information for this order

## 2024-05-18 ENCOUNTER — MYC REFILL (OUTPATIENT)
Dept: FAMILY MEDICINE | Facility: CLINIC | Age: 44
End: 2024-05-18
Payer: COMMERCIAL

## 2024-05-18 DIAGNOSIS — L30.1 DYSHIDROTIC ECZEMA: ICD-10-CM

## 2024-05-18 DIAGNOSIS — F90.0 ATTENTION DEFICIT HYPERACTIVITY DISORDER (ADHD), PREDOMINANTLY INATTENTIVE TYPE: ICD-10-CM

## 2024-05-18 RX ORDER — CLOBETASOL PROPIONATE 0.5 MG/G
OINTMENT TOPICAL 2 TIMES DAILY
Qty: 30 G | Refills: 2 | Status: CANCELLED | OUTPATIENT
Start: 2024-05-18

## 2024-05-20 ENCOUNTER — MYC REFILL (OUTPATIENT)
Dept: FAMILY MEDICINE | Facility: CLINIC | Age: 44
End: 2024-05-20
Payer: COMMERCIAL

## 2024-05-20 DIAGNOSIS — J30.81 ALLERGIC RHINITIS DUE TO ANIMAL HAIR AND DANDER: ICD-10-CM

## 2024-05-20 RX ORDER — AZELASTINE HYDROCHLORIDE 137 UG/1
2 SPRAY, METERED NASAL 2 TIMES DAILY
Qty: 30 ML | Refills: 3 | Status: SHIPPED | OUTPATIENT
Start: 2024-05-20 | End: 2024-06-19

## 2024-05-20 NOTE — TELEPHONE ENCOUNTER
Prescription approved per [facility] Refill Protocol.    Last Written Prescription Date:  11/21/23  Last Fill Quantity: 30ml,  # refills: 3   Last office visit: 2/1/2024

## 2024-05-20 NOTE — TELEPHONE ENCOUNTER
Last office visit: 2/1/2024     Future Appointments 5/20/2024 - 11/16/2024      None            Requested Prescriptions   Pending Prescriptions Disp Refills    clobetasol (TEMOVATE) 0.05 % external ointment 30 g 2     Sig: Apply topically 2 times daily       There is no refill protocol information for this order       amphetamine-dextroamphetamine (ADDERALL) 20 MG tablet 60 tablet 0     Sig: Take 1 tablet (20 mg) by mouth 2 times daily       There is no refill protocol information for this order

## 2024-05-21 ENCOUNTER — MYC MEDICAL ADVICE (OUTPATIENT)
Dept: FAMILY MEDICINE | Facility: CLINIC | Age: 44
End: 2024-05-21
Payer: COMMERCIAL

## 2024-05-21 RX ORDER — DEXTROAMPHETAMINE SACCHARATE, AMPHETAMINE ASPARTATE, DEXTROAMPHETAMINE SULFATE AND AMPHETAMINE SULFATE 5; 5; 5; 5 MG/1; MG/1; MG/1; MG/1
20 TABLET ORAL 2 TIMES DAILY
Qty: 60 TABLET | Refills: 0 | Status: SHIPPED | OUTPATIENT
Start: 2024-05-21 | End: 2024-06-19

## 2024-05-22 ENCOUNTER — MEDICAL CORRESPONDENCE (OUTPATIENT)
Dept: HEALTH INFORMATION MANAGEMENT | Facility: CLINIC | Age: 44
End: 2024-05-22
Payer: COMMERCIAL

## 2024-05-22 ENCOUNTER — TRANSFERRED RECORDS (OUTPATIENT)
Dept: HEALTH INFORMATION MANAGEMENT | Facility: CLINIC | Age: 44
End: 2024-05-22
Payer: COMMERCIAL

## 2024-06-09 ENCOUNTER — TRANSCRIBE ORDERS (OUTPATIENT)
Dept: OTHER | Age: 44
End: 2024-06-09

## 2024-06-09 DIAGNOSIS — K62.1 COLORECTAL POLYPS: Primary | ICD-10-CM

## 2024-06-09 DIAGNOSIS — K63.5 COLORECTAL POLYPS: Primary | ICD-10-CM

## 2024-06-12 ENCOUNTER — OFFICE VISIT (OUTPATIENT)
Dept: FAMILY MEDICINE | Facility: CLINIC | Age: 44
End: 2024-06-12
Payer: COMMERCIAL

## 2024-06-12 VITALS
DIASTOLIC BLOOD PRESSURE: 74 MMHG | RESPIRATION RATE: 16 BRPM | BODY MASS INDEX: 29.79 KG/M2 | SYSTOLIC BLOOD PRESSURE: 138 MMHG | OXYGEN SATURATION: 98 % | TEMPERATURE: 98.7 F | HEART RATE: 95 BPM | WEIGHT: 189.8 LBS | HEIGHT: 67 IN

## 2024-06-12 DIAGNOSIS — D12.8 ADENOMATOUS RECTAL POLYP: ICD-10-CM

## 2024-06-12 DIAGNOSIS — Z01.818 PREOPERATIVE EXAMINATION: Primary | ICD-10-CM

## 2024-06-12 PROCEDURE — 99214 OFFICE O/P EST MOD 30 MIN: CPT | Performed by: FAMILY MEDICINE

## 2024-06-12 ASSESSMENT — ENCOUNTER SYMPTOMS
CONSTIPATION: 0
ANAL BLEEDING: 0
BLOOD IN STOOL: 0
EYES NEGATIVE: 1
CONSTITUTIONAL NEGATIVE: 1
VOMITING: 0
MUSCULOSKELETAL NEGATIVE: 1
NEUROLOGICAL NEGATIVE: 1
RECTAL PAIN: 0
DIARRHEA: 1
CARDIOVASCULAR NEGATIVE: 1
RESPIRATORY NEGATIVE: 1
ALLERGIC/IMMUNOLOGIC NEGATIVE: 1
HEMATOLOGIC/LYMPHATIC NEGATIVE: 1
NAUSEA: 0
PSYCHIATRIC NEGATIVE: 1

## 2024-06-12 NOTE — PROGRESS NOTES
Preoperative Evaluation  [facility]  [address] [facility] [address]  Phone: [phone]  Fax: [phone]  Primary Provider: Torres Hill MD  Pre-op Performing Provider: Torres Hill MD  Jun 12, 2024 6/12/2024   Surgical Information   What procedure is being done? Flexible Sigmoidoscopy   Facility or Hospital where procedure/surgery will be performed: [facility]   Who is doing the procedure / surgery? Clara Angeles   Date of surgery / procedure: 6/21/2024   Time of surgery / procedure: 2pm   Where do you plan to recover after surgery? at home with family     Fax number for surgical facility: [phone]    Assessment & Plan     The proposed surgical procedure is considered LOW risk.    Preoperative examination      Adenomatous rectal polyp              - No identified additional risk factors other than previously addressed    Antiplatelet or Anticoagulation Medication Instructions   - Patient is on no antiplatelet or anticoagulation medications.    Additional Medication Instructions  Take all scheduled medications on the day of surgery    Recommendation  Approval given to proceed with proposed procedure, without further diagnostic evaluation.         Subjective   Anabela is a 43 year old, presenting for the following:  Pre-Op Exam (6/21/24 - Flexible Sigmoidoscopy - [facility] - Dr. Angeles)        HPI related to upcoming procedure: Anabela is a pleasant 43 year old female presenting for a pre-operative examination for her flexible sigmoidoscopy follow adenomas colon polyps. Per patient, the previous surgeon was only able to remove 80% of a polyp and therefore regarding follow up. The patient was unable to follow up in February and therefore is presenting today with concern for pre operative exam.         6/12/2024   Pre-Op Questionnaire   Have you ever had a heart attack or stroke? No   Have you ever  had surgery on your heart or blood vessels, such as a stent placement, a coronary artery bypass, or surgery on an artery in your head, neck, heart, or legs? No   Do you have chest pain with activity? No   Do you have a history of heart failure? No   Do you currently have a cold, bronchitis or symptoms of other infection? No   Do you have a cough, shortness of breath, or wheezing? No   Do you or anyone in your family have previous history of blood clots? No   Do you or does anyone in your family have a serious bleeding problem such as prolonged bleeding following surgeries or cuts? No   Have you ever had problems with anemia or been told to take iron pills? No   Have you had any abnormal blood loss such as black, tarry or bloody stools, or abnormal vaginal bleeding? No   Have you ever had a blood transfusion? No   Are you willing to have a blood transfusion if it is medically needed before, during, or after your surgery? Yes   Have you or any of your relatives ever had problems with anesthesia? No   Do you have sleep apnea, excessive snoring or daytime drowsiness? No   Do you have any artifical heart valves or other implanted medical devices like a pacemaker, defibrillator, or continuous glucose monitor? No   Do you have artificial joints? No   Are you allergic to latex? No     Health Care Directive  Patient does not have a Health Care Directive or Living Will: Discussed advance care planning with patient; however, patient declined at this time.    Preoperative Review of    reviewed - controlled substances reflected in medication list.          Patient Active Problem List    Diagnosis Date Noted    Attention deficit hyperactivity disorder (ADHD), predominantly inattentive type 03/02/2022     Priority: Medium    Anxiety Disorder NOS      Priority: Medium     Created by Conversion  Replacement Utility updated for latest IMO load        Chronic rhinitis      Priority: Medium     Created by Conversion  Replacement  Utility updated for latest IMO load        Onychomycosis of left great toe 05/11/2016     Priority: Medium    Class 1 obesity due to excess calories without serious comorbidity with body mass index (BMI) of 30.0 to 30.9 in adult      Priority: Medium     Created by Conversion        Recurrent Major Depression In Full Remission      Priority: Medium     Created by Conversion          Past Medical History:   Diagnosis Date    Depressive disorder teenage years    in full remission     Past Surgical History:   Procedure Laterality Date    COLONOSCOPY  05/03/2023    [facility]; Dr. Too Rose; indication:  diarrhea; TA x5 in cucum and ascending colon size 2-3mm; sessile serrated adenoma x2 in descending abnd sigmoid colon size 4-5mm; TA consistent with adv adenoma in rectum size 16-20mm; repeat in 1 yr    ENT SURGERY  1985    EYE SURGERY  2010    Lasix in 1 eye     Current Outpatient Medications   Medication Sig Dispense Refill    amphetamine-dextroamphetamine (ADDERALL) 20 MG tablet Take 1 tablet (20 mg) by mouth 2 times daily 60 tablet 0    amphetamine-dextroamphetamine (ADDERALL) 20 MG tablet Take 1 tablet (20 mg) by mouth 2 times daily 60 tablet 0    Azelastine HCl 137 MCG/SPRAY SOLN Spray 2 sprays in nostril 2 times daily 30 mL 3    cetirizine (ZYRTEC) 10 MG tablet Take 10 mg by mouth as needed for allergies      clobetasol (TEMOVATE) 0.05 % external ointment Apply topically 2 times daily 30 g 2    fluticasone (FLONASE) 50 MCG/ACT nasal spray Spray 2 sprays into both nostrils daily 48 g 0    lactobacillus rhamnosus (GG) (CULTURELL) capsule Take 1 capsule by mouth 2 times daily      Multiple Vitamins-Minerals (MULTIVITAMIN ADULT, MINERALS, PO) Take 1 tablet by mouth daily         Allergies   Allergen Reactions    Penicillins Hives    Sulfa (Sulfonamide Antibiotics) [Sulfa Antibiotics] Hives        Social History     Tobacco Use    Smoking status: Some Days     Current packs/day: 0.00     Types:  "Cigarettes     Start date: 1999     Last attempt to quit: 3/22/2022     Years since quittin.2     Passive exposure: Yes    Smokeless tobacco: Never    Tobacco comments:     1 cigarettes a week or less.   Substance Use Topics    Alcohol use: Yes     Comment: casual/occasional       History   Drug Use Unknown           Review of Systems   Constitutional: Negative.    HENT: Negative.     Eyes: Negative.    Respiratory: Negative.     Cardiovascular: Negative.    Gastrointestinal:  Positive for diarrhea. Negative for anal bleeding, blood in stool, constipation, nausea, rectal pain and vomiting.   Genitourinary: Negative.    Musculoskeletal: Negative.    Skin: Negative.    Allergic/Immunologic: Negative.    Neurological: Negative.    Hematological: Negative.    Psychiatric/Behavioral: Negative.          Objective    /74 (BP Location: Right arm, Patient Position: Sitting, Cuff Size: Adult Large)   Pulse 95   Temp 98.7  F (37.1  C) (Tympanic)   Resp 16   Ht 1.702 m (5' 7\")   Wt 86.1 kg (189 lb 12.8 oz)   LMP 06/10/2024 (Exact Date)   SpO2 98%   BMI 29.73 kg/m     Estimated body mass index is 29.73 kg/m  as calculated from the following:    Height as of this encounter: 1.702 m (5' 7\").    Weight as of this encounter: 86.1 kg (189 lb 12.8 oz).  Physical Exam  GENERAL: alert and no distress  EYES: Eyes grossly normal to inspection, PERRL and conjunctivae and sclerae normal  HENT: ear canals and TM's normal, nose and mouth without ulcers or lesions  NECK: no adenopathy, no asymmetry, masses, or scars  RESP: lungs clear to auscultation - no rales, rhonchi or wheezes  CV: regular rate and rhythm, normal S1 S2, no S3 or S4, no murmur, click or rub, no peripheral edema  ABDOMEN: soft, nontender, no hepatosplenomegaly, no masses and bowel sounds normal  MS: no gross musculoskeletal defects noted, no edema  PSYCH: mentation appears normal, affect normal/bright    No results for input(s): \"HGB\", \"PLT\", \"INR\", " "\"NA\", \"POTASSIUM\", \"CR\", \"A1C\" in the last 8760 hours.     Diagnostics  No labs were ordered during this visit.   No EKG required for low risk surgery (cataract, skin procedure, breast biopsy, etc).    Revised Cardiac Risk Index (RCRI)  The patient has the following serious cardiovascular risks for perioperative complications:   - No serious cardiac risks = 0 points     RCRI Interpretation: 0 points: Class I (very low risk - 0.4% complication rate)     Melisa Lucas, [facility] student on 6/12/2024 at 4:06 PM    Physician Attestation   I, Torres Hill MD, was present with the medical/CHRISTIN student who participated in the service and in the documentation of the note.  I have verified the history and personally performed the physical exam and medical decision making.  I agree with the assessment and plan of care as documented in the note.        Torres Hill MD   Signed Electronically by: Torres Hill MD  Copy of this evaluation report is provided to requesting physician.         "

## 2024-06-18 ENCOUNTER — TRANSCRIBE ORDERS (OUTPATIENT)
Dept: OTHER | Age: 44
End: 2024-06-18

## 2024-06-18 ENCOUNTER — PATIENT OUTREACH (OUTPATIENT)
Dept: ONCOLOGY | Facility: CLINIC | Age: 44
End: 2024-06-18
Payer: COMMERCIAL

## 2024-06-18 ENCOUNTER — PRE VISIT (OUTPATIENT)
Dept: ONCOLOGY | Facility: CLINIC | Age: 44
End: 2024-06-18
Payer: COMMERCIAL

## 2024-06-18 DIAGNOSIS — K62.1 COLORECTAL POLYPS: Primary | ICD-10-CM

## 2024-06-18 DIAGNOSIS — K63.5 COLORECTAL POLYPS: Primary | ICD-10-CM

## 2024-06-18 NOTE — PROGRESS NOTES
Writer received referral to Cancer Risk Management/Genetic Counseling.    Referred for: Genetic Counseling;  Colorectal polyps     Referred by: Adelina Marquis    Referral reviewed for appropriate plan and sent to New Patient Scheduling ([phone]) for completion.

## 2024-06-18 NOTE — TELEPHONE ENCOUNTER
Action    Action Taken 6/18/2024 10:09AM KEB   Warm Transfer Scheduling     I spoke to Anabela -     [facility] (Colonoscopies, referred her to [facility], [facility] [place] colonoscopy report is in EPIC)   [facility] (biopsy)   [facility] (Records are viewable in [facility])     I called [facility] Ph: [phone],  ext: [phone], opt 4- they have two colonoscopies and the reports are already in [facility]. They have some labs and office notes April 17th 2023- those records are also in [facility].     I called [facility] [phone]- they will fax records to [phone] Attn: Saranya. They will send procedure notes, biopsy reports, office notes, etc.. no image reports

## 2024-06-19 ENCOUNTER — MYC REFILL (OUTPATIENT)
Dept: FAMILY MEDICINE | Facility: CLINIC | Age: 44
End: 2024-06-19
Payer: COMMERCIAL

## 2024-06-19 DIAGNOSIS — J30.81 ALLERGIC RHINITIS DUE TO ANIMAL HAIR AND DANDER: ICD-10-CM

## 2024-06-19 DIAGNOSIS — J30.9 ALLERGIC RHINITIS: ICD-10-CM

## 2024-06-19 DIAGNOSIS — F90.0 ATTENTION DEFICIT HYPERACTIVITY DISORDER (ADHD), PREDOMINANTLY INATTENTIVE TYPE: ICD-10-CM

## 2024-06-19 RX ORDER — FLUTICASONE PROPIONATE 50 MCG
2 SPRAY, SUSPENSION (ML) NASAL DAILY
Qty: 48 G | Refills: 0 | Status: SHIPPED | OUTPATIENT
Start: 2024-06-19

## 2024-06-19 RX ORDER — AZELASTINE HYDROCHLORIDE 137 UG/1
2 SPRAY, METERED NASAL 2 TIMES DAILY
Qty: 30 ML | Refills: 3 | Status: SHIPPED | OUTPATIENT
Start: 2024-06-19

## 2024-06-19 RX ORDER — DEXTROAMPHETAMINE SACCHARATE, AMPHETAMINE ASPARTATE, DEXTROAMPHETAMINE SULFATE AND AMPHETAMINE SULFATE 5; 5; 5; 5 MG/1; MG/1; MG/1; MG/1
20 TABLET ORAL 2 TIMES DAILY
Qty: 60 TABLET | Refills: 0 | Status: SHIPPED | OUTPATIENT
Start: 2024-06-19 | End: 2024-07-19

## 2024-06-19 NOTE — TELEPHONE ENCOUNTER
Routing refill request to provider for review/approval because:  Drug not on the [facility] refill protocol     Adderall Last Written Prescription Date:  5/21/24  Last Fill Quantity: 60,  # refills: 0   Last office visit: 6/12/2024

## 2024-06-19 NOTE — TELEPHONE ENCOUNTER
Prescription approved per [facility] Refill Protocol.    Last Written Prescription Date:  11/21/23  Last Fill Quantity: 48g,  # refills: 0   Last office visit: 6/12/2024

## 2024-06-21 ENCOUNTER — HOSPITAL ENCOUNTER (OUTPATIENT)
Facility: AMBULATORY SURGERY CENTER | Age: 44
End: 2024-06-21
Payer: COMMERCIAL

## 2024-07-19 ENCOUNTER — MYC REFILL (OUTPATIENT)
Dept: FAMILY MEDICINE | Facility: CLINIC | Age: 44
End: 2024-07-19
Payer: COMMERCIAL

## 2024-07-19 DIAGNOSIS — F90.0 ATTENTION DEFICIT HYPERACTIVITY DISORDER (ADHD), PREDOMINANTLY INATTENTIVE TYPE: ICD-10-CM

## 2024-07-22 NOTE — TELEPHONE ENCOUNTER
Last office visit: 6/12/2024     Future Appointments 7/22/2024 - 1/18/2025      None            Requested Prescriptions   Pending Prescriptions Disp Refills    amphetamine-dextroamphetamine (ADDERALL) 20 MG tablet 60 tablet 0     Sig: Take 1 tablet (20 mg) by mouth 2 times daily       There is no refill protocol information for this order

## 2024-07-23 RX ORDER — DEXTROAMPHETAMINE SACCHARATE, AMPHETAMINE ASPARTATE, DEXTROAMPHETAMINE SULFATE AND AMPHETAMINE SULFATE 5; 5; 5; 5 MG/1; MG/1; MG/1; MG/1
20 TABLET ORAL 2 TIMES DAILY
Qty: 60 TABLET | Refills: 0 | Status: SHIPPED | OUTPATIENT
Start: 2024-07-23 | End: 2024-08-20

## 2024-08-20 ENCOUNTER — MYC REFILL (OUTPATIENT)
Dept: FAMILY MEDICINE | Facility: CLINIC | Age: 44
End: 2024-08-20
Payer: COMMERCIAL

## 2024-08-20 DIAGNOSIS — L30.1 DYSHIDROTIC ECZEMA: ICD-10-CM

## 2024-08-20 DIAGNOSIS — F90.0 ATTENTION DEFICIT HYPERACTIVITY DISORDER (ADHD), PREDOMINANTLY INATTENTIVE TYPE: ICD-10-CM

## 2024-08-20 RX ORDER — CLOBETASOL PROPIONATE 0.5 MG/G
OINTMENT TOPICAL 2 TIMES DAILY
Qty: 30 G | Refills: 2 | Status: SHIPPED | OUTPATIENT
Start: 2024-08-20

## 2024-08-20 RX ORDER — DEXTROAMPHETAMINE SACCHARATE, AMPHETAMINE ASPARTATE, DEXTROAMPHETAMINE SULFATE AND AMPHETAMINE SULFATE 5; 5; 5; 5 MG/1; MG/1; MG/1; MG/1
20 TABLET ORAL 2 TIMES DAILY
Qty: 60 TABLET | Refills: 0 | Status: SHIPPED | OUTPATIENT
Start: 2024-08-20 | End: 2024-09-17

## 2024-08-20 NOTE — TELEPHONE ENCOUNTER
Routing refill request to provider for review/approval because:  Drug not on the [facility] refill protocol       Last Written Prescription Date:  7/23/24  Last Fill Quantity: 60,  # refills: 0   Last office visit: 6/12/2024

## 2024-08-20 NOTE — TELEPHONE ENCOUNTER
Routing refill request to provider for review/approval because:  Drug not on the [facility] refill protocol     Last Written Prescription Date:  12/22/23  Last Fill Quantity: 30g,  # refills: 2   Last office visit: 6/12/2024

## 2024-09-17 ENCOUNTER — MYC REFILL (OUTPATIENT)
Dept: FAMILY MEDICINE | Facility: CLINIC | Age: 44
End: 2024-09-17
Payer: COMMERCIAL

## 2024-09-17 DIAGNOSIS — F90.0 ATTENTION DEFICIT HYPERACTIVITY DISORDER (ADHD), PREDOMINANTLY INATTENTIVE TYPE: ICD-10-CM

## 2024-09-17 RX ORDER — DEXTROAMPHETAMINE SACCHARATE, AMPHETAMINE ASPARTATE, DEXTROAMPHETAMINE SULFATE AND AMPHETAMINE SULFATE 5; 5; 5; 5 MG/1; MG/1; MG/1; MG/1
20 TABLET ORAL 2 TIMES DAILY
Qty: 60 TABLET | Refills: 0 | Status: SHIPPED | OUTPATIENT
Start: 2024-09-17

## 2024-09-17 NOTE — TELEPHONE ENCOUNTER
Routing refill request to provider for review/approval because:  Drug not on the [facility] refill protocol     Last Written Prescription Date:  8/20/24  Last Fill Quantity: 60,  # refills: 0   Last office visit: 6/12/2024

## 2024-10-16 ENCOUNTER — MYC REFILL (OUTPATIENT)
Dept: FAMILY MEDICINE | Facility: CLINIC | Age: 44
End: 2024-10-16
Payer: COMMERCIAL

## 2024-10-16 DIAGNOSIS — F90.0 ATTENTION DEFICIT HYPERACTIVITY DISORDER (ADHD), PREDOMINANTLY INATTENTIVE TYPE: ICD-10-CM

## 2024-10-16 RX ORDER — DEXTROAMPHETAMINE SACCHARATE, AMPHETAMINE ASPARTATE, DEXTROAMPHETAMINE SULFATE AND AMPHETAMINE SULFATE 5; 5; 5; 5 MG/1; MG/1; MG/1; MG/1
20 TABLET ORAL 2 TIMES DAILY
Qty: 60 TABLET | Refills: 0 | Status: SHIPPED | OUTPATIENT
Start: 2024-10-16

## 2024-10-16 NOTE — TELEPHONE ENCOUNTER
Last office visit: 6/12/2024     Future Appointments 10/16/2024 - 4/14/2025        Date Visit Type Length Department Provider     11/4/2024  4:15 PM MYC 3D SCREENING MAMMOGRAM 15 min [id number]    Location Instructions:     [facility] Check in via [address], [place] [address]  Parking Imaging customers can park in the main parking lot of the [facility].  Entrance and check-in location Enter the main entrance and check in at the welcome desk. The welcome desk will direct you to the lower level of the [facility], where you will check in for your appointment at [address]. Mammography Patients please check in at the Main Entrance of the [facility] at Welcome Desk.[address]Breast Center is located in the Lower Level.  This appointment is in a hospital-based location.&nbsp; Before your visit, you may want to check with your insurance company for coverage and referral options, including cost differences between services provided in different clinic settings.&nbsp; For more information visit this link on the [employer] Website:&nbsp; tinyurl/MHFVBillingFAQ                     Requested Prescriptions   Pending Prescriptions Disp Refills    amphetamine-dextroamphetamine (ADDERALL) 20 MG tablet 60 tablet 0     Sig: Take 1 tablet (20 mg) by mouth 2 times daily.       There is no refill protocol information for this order

## 2024-11-04 ENCOUNTER — HOSPITAL ENCOUNTER (OUTPATIENT)
Dept: MAMMOGRAPHY | Facility: CLINIC | Age: 44
Discharge: HOME | End: 2024-11-04
Attending: FAMILY MEDICINE | Admitting: FAMILY MEDICINE
Payer: COMMERCIAL

## 2024-11-04 DIAGNOSIS — Z12.31 VISIT FOR SCREENING MAMMOGRAM: ICD-10-CM

## 2024-11-04 PROCEDURE — 77063 BREAST TOMOSYNTHESIS BI: CPT

## 2024-11-14 ENCOUNTER — MYC REFILL (OUTPATIENT)
Dept: FAMILY MEDICINE | Facility: CLINIC | Age: 44
End: 2024-11-14
Payer: COMMERCIAL

## 2024-11-14 DIAGNOSIS — F90.0 ATTENTION DEFICIT HYPERACTIVITY DISORDER (ADHD), PREDOMINANTLY INATTENTIVE TYPE: ICD-10-CM

## 2024-11-14 DIAGNOSIS — J30.9 ALLERGIC RHINITIS: ICD-10-CM

## 2024-11-14 DIAGNOSIS — J30.81 ALLERGIC RHINITIS DUE TO ANIMAL HAIR AND DANDER: ICD-10-CM

## 2024-11-14 RX ORDER — AZELASTINE HYDROCHLORIDE 137 UG/1
2 SPRAY, METERED NASAL 2 TIMES DAILY
Qty: 30 ML | Refills: 3 | Status: SHIPPED | OUTPATIENT
Start: 2024-11-14

## 2024-11-14 RX ORDER — FLUTICASONE PROPIONATE 50 MCG
2 SPRAY, SUSPENSION (ML) NASAL DAILY
Qty: 48 G | Refills: 0 | Status: SHIPPED | OUTPATIENT
Start: 2024-11-14

## 2024-11-14 RX ORDER — DEXTROAMPHETAMINE SACCHARATE, AMPHETAMINE ASPARTATE, DEXTROAMPHETAMINE SULFATE AND AMPHETAMINE SULFATE 5; 5; 5; 5 MG/1; MG/1; MG/1; MG/1
20 TABLET ORAL 2 TIMES DAILY
Qty: 60 TABLET | Refills: 0 | Status: SHIPPED | OUTPATIENT
Start: 2024-11-14

## 2024-12-14 ENCOUNTER — MYC REFILL (OUTPATIENT)
Dept: FAMILY MEDICINE | Facility: CLINIC | Age: 44
End: 2024-12-14
Payer: COMMERCIAL

## 2024-12-14 DIAGNOSIS — F90.0 ATTENTION DEFICIT HYPERACTIVITY DISORDER (ADHD), PREDOMINANTLY INATTENTIVE TYPE: ICD-10-CM

## 2024-12-16 ENCOUNTER — E-VISIT (OUTPATIENT)
Dept: FAMILY MEDICINE | Facility: CLINIC | Age: 44
End: 2024-12-16
Payer: COMMERCIAL

## 2024-12-16 DIAGNOSIS — F90.0 ATTENTION-DEFICIT HYPERACTIVITY DISORDER, PREDOMINANTLY INATTENTIVE TYPE: ICD-10-CM

## 2024-12-16 RX ORDER — DEXTROAMPHETAMINE SACCHARATE, AMPHETAMINE ASPARTATE, DEXTROAMPHETAMINE SULFATE AND AMPHETAMINE SULFATE 5; 5; 5; 5 MG/1; MG/1; MG/1; MG/1
20 TABLET ORAL 2 TIMES DAILY
Qty: 60 TABLET | Refills: 0 | Status: SHIPPED | OUTPATIENT
Start: 2024-12-16

## 2024-12-16 RX ORDER — DEXTROAMPHETAMINE SACCHARATE, AMPHETAMINE ASPARTATE, DEXTROAMPHETAMINE SULFATE AND AMPHETAMINE SULFATE 5; 5; 5; 5 MG/1; MG/1; MG/1; MG/1
20 TABLET ORAL 2 TIMES DAILY
Qty: 60 TABLET | Refills: 0 | Status: SHIPPED | OUTPATIENT
Start: 2025-01-14

## 2024-12-16 ASSESSMENT — ANXIETY QUESTIONNAIRES
5. BEING SO RESTLESS THAT IT IS HARD TO SIT STILL: NOT AT ALL
1. FEELING NERVOUS, ANXIOUS, OR ON EDGE: NOT AT ALL
4. TROUBLE RELAXING: NOT AT ALL
IF YOU CHECKED OFF ANY PROBLEMS ON THIS QUESTIONNAIRE, HOW DIFFICULT HAVE THESE PROBLEMS MADE IT FOR YOU TO DO YOUR WORK, TAKE CARE OF THINGS AT HOME, OR GET ALONG WITH OTHER PEOPLE: NOT DIFFICULT AT ALL
2. NOT BEING ABLE TO STOP OR CONTROL WORRYING: NOT AT ALL
GAD7 TOTAL SCORE: 0
3. WORRYING TOO MUCH ABOUT DIFFERENT THINGS: NOT AT ALL
8. IF YOU CHECKED OFF ANY PROBLEMS, HOW DIFFICULT HAVE THESE MADE IT FOR YOU TO DO YOUR WORK, TAKE CARE OF THINGS AT HOME, OR GET ALONG WITH OTHER PEOPLE?: NOT DIFFICULT AT ALL
GAD7 TOTAL SCORE: 0
7. FEELING AFRAID AS IF SOMETHING AWFUL MIGHT HAPPEN: NOT AT ALL
7. FEELING AFRAID AS IF SOMETHING AWFUL MIGHT HAPPEN: NOT AT ALL
GAD7 TOTAL SCORE: 0
6. BECOMING EASILY ANNOYED OR IRRITABLE: NOT AT ALL

## 2024-12-16 NOTE — PATIENT INSTRUCTIONS
I am glad you are doing well. I have refilled your medication:  Orders Placed This Encounter   Medications     amphetamine-dextroamphetamine (ADDERALL) 20 MG tablet     Sig: Take 1 tablet (20 mg) by mouth 2 times daily.     Dispense:  60 tablet     Refill:  0          View your full visit summary for details by clicking on the link below. Your pharmacist will be able to address any questions you may have about the medication.      Thank you for choosing us for your care.

## 2024-12-16 NOTE — TELEPHONE ENCOUNTER
Routing refill request to provider for review/approval because:  Drug not on the [facility] refill protocol       Last Written Prescription Date:  11/14/24  Last Fill Quantity: 60,  # refills: 0   Last office visit: 6/12/2024

## 2025-02-14 ENCOUNTER — MYC REFILL (OUTPATIENT)
Dept: FAMILY MEDICINE | Facility: CLINIC | Age: 45
End: 2025-02-14
Payer: COMMERCIAL

## 2025-02-14 DIAGNOSIS — F90.0 ATTENTION DEFICIT HYPERACTIVITY DISORDER (ADHD), PREDOMINANTLY INATTENTIVE TYPE: ICD-10-CM

## 2025-02-17 RX ORDER — DEXTROAMPHETAMINE SACCHARATE, AMPHETAMINE ASPARTATE, DEXTROAMPHETAMINE SULFATE AND AMPHETAMINE SULFATE 5; 5; 5; 5 MG/1; MG/1; MG/1; MG/1
20 TABLET ORAL 2 TIMES DAILY
Qty: 60 TABLET | Refills: 0 | Status: SHIPPED | OUTPATIENT
Start: 2025-02-17

## 2025-03-08 ENCOUNTER — HEALTH MAINTENANCE LETTER (OUTPATIENT)
Age: 45
End: 2025-03-08

## 2025-03-18 ENCOUNTER — MYC REFILL (OUTPATIENT)
Dept: FAMILY MEDICINE | Facility: CLINIC | Age: 45
End: 2025-03-18
Payer: COMMERCIAL

## 2025-03-18 DIAGNOSIS — F90.0 ATTENTION DEFICIT HYPERACTIVITY DISORDER (ADHD), PREDOMINANTLY INATTENTIVE TYPE: ICD-10-CM

## 2025-03-18 RX ORDER — DEXTROAMPHETAMINE SACCHARATE, AMPHETAMINE ASPARTATE, DEXTROAMPHETAMINE SULFATE AND AMPHETAMINE SULFATE 5; 5; 5; 5 MG/1; MG/1; MG/1; MG/1
20 TABLET ORAL 2 TIMES DAILY
Qty: 60 TABLET | Refills: 0 | Status: SHIPPED | OUTPATIENT
Start: 2025-03-18

## 2025-03-19 NOTE — TELEPHONE ENCOUNTER
Spoke with patient and she declined scheduling over the phone at this time but stated she will schedule a PX/Med Check via [place] within the next month with Dr Hill.

## 2025-03-25 SDOH — HEALTH STABILITY: PHYSICAL HEALTH: ON AVERAGE, HOW MANY MINUTES DO YOU ENGAGE IN EXERCISE AT THIS LEVEL?: 50 MIN

## 2025-03-25 SDOH — HEALTH STABILITY: PHYSICAL HEALTH: ON AVERAGE, HOW MANY DAYS PER WEEK DO YOU ENGAGE IN MODERATE TO STRENUOUS EXERCISE (LIKE A BRISK WALK)?: 4 DAYS

## 2025-03-25 ASSESSMENT — SOCIAL DETERMINANTS OF HEALTH (SDOH): HOW OFTEN DO YOU GET TOGETHER WITH FRIENDS OR RELATIVES?: ONCE A WEEK

## 2025-03-26 ENCOUNTER — OFFICE VISIT (OUTPATIENT)
Dept: FAMILY MEDICINE | Facility: CLINIC | Age: 45
End: 2025-03-26
Payer: COMMERCIAL

## 2025-03-26 VITALS
TEMPERATURE: 96.9 F | SYSTOLIC BLOOD PRESSURE: 142 MMHG | DIASTOLIC BLOOD PRESSURE: 80 MMHG | HEIGHT: 68 IN | OXYGEN SATURATION: 100 % | HEART RATE: 89 BPM | BODY MASS INDEX: 29.3 KG/M2 | RESPIRATION RATE: 16 BRPM | WEIGHT: 193.3 LBS

## 2025-03-26 DIAGNOSIS — F90.0 ATTENTION DEFICIT HYPERACTIVITY DISORDER (ADHD), PREDOMINANTLY INATTENTIVE TYPE: ICD-10-CM

## 2025-03-26 DIAGNOSIS — Z78.9 HEALTH MAINTENANCE ALTERATION: Primary | ICD-10-CM

## 2025-03-26 DIAGNOSIS — R03.0 ELEVATED BLOOD PRESSURE READING WITHOUT DIAGNOSIS OF HYPERTENSION: ICD-10-CM

## 2025-03-26 DIAGNOSIS — F17.200 TOBACCO DEPENDENCE SYNDROME: ICD-10-CM

## 2025-03-26 DIAGNOSIS — Z13.6 SCREENING FOR CARDIOVASCULAR CONDITION: ICD-10-CM

## 2025-03-26 DIAGNOSIS — Z13.1 SCREENING FOR DIABETES MELLITUS: ICD-10-CM

## 2025-03-26 LAB
ANION GAP SERPL CALCULATED.3IONS-SCNC: 10 MMOL/L (ref 7–15)
BUN SERPL-MCNC: 11.2 MG/DL (ref 6–20)
CALCIUM SERPL-MCNC: 9.3 MG/DL (ref 8.8–10.4)
CHLORIDE SERPL-SCNC: 106 MMOL/L (ref 98–107)
CHOLEST SERPL-MCNC: 192 MG/DL
CREAT SERPL-MCNC: 0.61 MG/DL (ref 0.51–0.95)
EGFRCR SERPLBLD CKD-EPI 2021: >90 ML/MIN/1.73M2
FASTING STATUS PATIENT QL REPORTED: ABNORMAL
FASTING STATUS PATIENT QL REPORTED: NORMAL
GLUCOSE SERPL-MCNC: 94 MG/DL (ref 70–99)
HCO3 SERPL-SCNC: 25 MMOL/L (ref 22–29)
HDLC SERPL-MCNC: 66 MG/DL
LDLC SERPL CALC-MCNC: 114 MG/DL
NONHDLC SERPL-MCNC: 126 MG/DL
POTASSIUM SERPL-SCNC: 3.9 MMOL/L (ref 3.4–5.3)
SODIUM SERPL-SCNC: 141 MMOL/L (ref 135–145)
TRIGL SERPL-MCNC: 60 MG/DL

## 2025-03-26 PROCEDURE — 3077F SYST BP >= 140 MM HG: CPT | Performed by: FAMILY MEDICINE

## 2025-03-26 PROCEDURE — 80048 BASIC METABOLIC PNL TOTAL CA: CPT | Performed by: FAMILY MEDICINE

## 2025-03-26 PROCEDURE — 36415 COLL VENOUS BLD VENIPUNCTURE: CPT | Performed by: FAMILY MEDICINE

## 2025-03-26 PROCEDURE — 99396 PREV VISIT EST AGE 40-64: CPT | Performed by: FAMILY MEDICINE

## 2025-03-26 PROCEDURE — 80061 LIPID PANEL: CPT | Performed by: FAMILY MEDICINE

## 2025-03-26 PROCEDURE — G2211 COMPLEX E/M VISIT ADD ON: HCPCS | Performed by: FAMILY MEDICINE

## 2025-03-26 PROCEDURE — 3079F DIAST BP 80-89 MM HG: CPT | Performed by: FAMILY MEDICINE

## 2025-03-26 PROCEDURE — 99214 OFFICE O/P EST MOD 30 MIN: CPT | Mod: 25 | Performed by: FAMILY MEDICINE

## 2025-03-26 RX ORDER — VARENICLINE TARTRATE 0.5 (11)-1
KIT ORAL
Qty: 53 TABLET | Refills: 0 | Status: SHIPPED | OUTPATIENT
Start: 2025-03-26

## 2025-03-26 RX ORDER — DEXTROAMPHETAMINE SACCHARATE, AMPHETAMINE ASPARTATE, DEXTROAMPHETAMINE SULFATE AND AMPHETAMINE SULFATE 5; 5; 5; 5 MG/1; MG/1; MG/1; MG/1
20 TABLET ORAL 2 TIMES DAILY
Qty: 60 TABLET | Refills: 0 | Status: SHIPPED | OUTPATIENT
Start: 2025-04-18

## 2025-03-26 RX ORDER — VARENICLINE TARTRATE 1 MG/1
1 TABLET, FILM COATED ORAL 2 TIMES DAILY
Qty: 60 TABLET | Refills: 2 | Status: SHIPPED | OUTPATIENT
Start: 2025-03-26

## 2025-03-26 NOTE — LETTER
[facility] [place]  03/26/25  Patient: Anabela Ortiz  YOB: 1980  Medical Record Number: [id number]                                                                                  Non-Opioid Controlled Substance Agreement    This is an agreement between you and your provider regarding safe and appropriate use of controlled substances prescribed by your care team. Controlled substances are?medicines that can cause physical and mental dependence (abuse).     There are strict laws about having and using these medicines. We here at [facility] are  committed to working with you in your efforts to get better. To support you in this work, we'll help you schedule regular office appointments for medicine refills. If we must cancel or change your appointment for any reason, we'll make sure you have enough medicine to last until your next appointment.     As a Provider, I will:   Listen carefully to your concerns while treating you with respect.   Recommend a treatment plan that I believe is in your best interest and may involve therapies other than medicine.    Talk with you often about the possible benefits and the risk of harm of any medicine that we prescribe for you.  Assess the safety of this medicine and check how well it works.    Provide a plan on how to taper (discontinue or go off) using this medicine if the decision is made to stop its use.      ::  As a Patient, I understand controlled substances:     Are prescribed by my care provider to help me function or work and manage my condition(s).?  Are strong medicines and can cause serious side effects.     Need to be taken exactly as prescribed.?Combining controlled substances with certain medicines or chemicals (such as illegal drugs, alcohol, sedatives, sleeping pills, and benzodiazepines) can be dangerous or even fatal.? If I stop taking my medicines suddenly, I may have severe withdrawal symptoms.     The risks,  benefits, and side effects of these medicine(s) were explained to me. I agree that:    I will take part in other treatments as advised by my care team. This may be psychiatry or counseling, physical therapy, behavioral therapy, group treatment or a referral to specialist.    I will keep all my appointments and understand this is part of the monitoring of controlled substances.?My care team may require an office visit for EVERY controlled substance refill. If I miss appointments or don t follow instructions, my care team may stop my medicine    I will take my medicines as prescribed. I will not change the dose or schedule unless my care team tells me to. There will be no refills if I run out early.      I may be asked to come to the clinic and complete a urine drug test or complete a pill count. If I don t give a urine sample or participate in a pill count, the care team may stop my medicine.    I will only receive controlled substance prescriptions from this clinic. If I am treated by another provider, I will tell them that I am taking controlled substances and that I have a treatment agreement with this provider. I will inform my [facility] care team within one business day if I am given a prescription for any controlled substance by another healthcare provider. My [facility] care team can contact other providers and pharmacists about my use of any medicines.    It is up to me to make sure that I don't run out of my medicines on weekends or holidays.?If my care team is willing to refill my prescription without a visit, I must request refills only during office hours. Refills may take up to 3 business days to process. I will use one pharmacy to fill all my controlled substance prescriptions. I will notify the clinic about any changes to my insurance or medicine availability.    I am responsible for my prescriptions. If the medicine/prescription is lost, stolen or destroyed, it will not be replaced.?I  also agree not to share controlled substance medicines with anyone.     I am aware I should not use any illegal or recreational drugs. I agree not to drink alcohol unless my care team says I can.     If I enroll in the [place] [facility] program, I will tell my care team before my next refill.    I will tell my care team right away if I become pregnant, have a new medical problem treated outside of my regular clinic, or have a change in my medicines.     I understand that this medicine can affect my thinking, judgment and reaction time.? Alcohol and drugs affect the brain and body, which can affect the safety of my driving. Being under the influence of alcohol or drugs can affect my decision-making, behaviors, personal safety and the safety of others. Driving while impaired (DWI) can occur if a person is driving, operating or in physical control of a car, motorcycle, boat, snowmobile, ATV, motorbike, off-road vehicle or any other motor vehicle ([place] Statute 169[place].20). I understand the risk if I choose to drive or operate any vehicle or machinery.    I understand that if I do not follow any of the conditions above, my prescriptions or treatment may be stopped or changed.   I agree that my provider, clinic care team and pharmacy may work with any city, state or federal law enforcement agency that investigates the misuse, sale or other diversion of my controlled medicine. I will allow my provider to discuss my care with, or share a copy of, this agreement with any other treating provider, pharmacy or emergency room where I receive care.     I have read this agreement and have asked questions about anything I did not understand.    ________________________________________________________  Patient Signature - Anabela Ortiz     ___________________                   Date     ________________________________________________________  Provider Signature - Torres Hill MD       ___________________                    Date     ________________________________________________________  Witness Signature (required if provider not present while patient signing)          ___________________                   Date

## 2025-03-26 NOTE — PROGRESS NOTES
"Preventive Care Visit  [facility]  Torres Hill MD, Family Medicine  Mar 26, 2025      Assessment & Plan     Health maintenance alteration  We have discussed health maintenance, routine follow-up, immunizations, heart healthy diet, regular activity, weight maintenance.     Attention deficit hyperactivity disorder (ADHD), predominantly inattentive type  Symptoms are controlled with current stimulant.  PDMP queried with no concerns.  - amphetamine-dextroamphetamine (ADDERALL) 20 MG tablet; Take 1 tablet (20 mg) by mouth 2 times daily.    Screening for diabetes mellitus  BMP ordered    Screening for cardiovascular condition  - Lipid panel reflex to direct LDL Non-fasting; Future    Elevated blood pressure reading without diagnosis of hypertension  Blood pressure is improved on recheck in the office.  She will continue to monitor blood pressures at home.  If blood pressure average at home is noted 135/85 lisinopril-hydrochlorothiazide 10-25  - Basic metabolic panel; Future    Tobacco dependence syndrome  Resume verenicline  - varenicline (CHANTIX HIREN) 0.5 MG X 11 & 1 MG X 42 tablet; Take 0.5 mg tab daily for 3 days, THEN 0.5 mg tab twice daily for 4 days, THEN 1 mg twice daily.  - varenicline (CHANTIX) 1 MG tablet; Take 1 tablet (1 mg) by mouth 2 times daily.    Patient has been advised of split billing requirements and indicates understanding: Yes        Nicotine/Tobacco Cessation  She reports that she has been smoking cigarettes. She started smoking about 25 years ago. She has a 10 pack-year smoking history. She has been exposed to tobacco smoke. She has never used smokeless tobacco.  Nicotine/Tobacco Cessation Plan  Pharmacotherapies : varenicline (Chantix)      BMI  Estimated body mass index is 29.83 kg/m  as calculated from the following:    Height as of this encounter: 1.715 m (5' 7.5\").    Weight as of this encounter: 87.7 kg (193 lb 4.8 oz).       Counseling  Appropriate " preventive services were addressed with this patient via screening, questionnaire, or discussion as appropriate for fall prevention, nutrition, physical activity, Tobacco-use cessation, social engagement, weight loss and cognition.  Checklist reviewing preventive services available has been given to the patient.  Reviewed patient's diet, addressing concerns and/or questions.   She is at risk for psychosocial distress and has been provided with information to reduce risk.           Kevin Peñaloza is a 44 year old, presenting for the following:  Physical (Annual px, refill meds.  Also wants rx for Chantix)        3/26/2025    10:49 AM   Additional Questions   Roomed by Cecile BROWN CMA   Accompanied by Self          HPI  Patient is here for health maintenance.  She has been doing fairly well.  Her ADD is under control.  She has history of slightly elevated blood pressures.  She would like to resume Chantix for smoking cessation.  She is due for lipid and diabetes screening.        Advance Care Planning  Patient does not have a Health Care Directive: Discussed advance care planning with patient; information given to patient to review.      3/25/2025   General Health   How would you rate your overall physical health? Good   Feel stress (tense, anxious, or unable to sleep) To some extent   (!) STRESS CONCERN      3/25/2025   Nutrition   Three or more servings of calcium each day? Yes   Diet: Regular (no restrictions)   How many servings of fruit and vegetables per day? (!) 2-3   How many sweetened beverages each day? (!) 3         3/25/2025   Exercise   Days per week of moderate/strenous exercise 4 days   Average minutes spent exercising at this level 50 min         3/25/2025   Social Factors   Frequency of gathering with friends or relatives Once a week   Worry food won't last until get money to buy more No   Food not last or not have enough money for food? No   Do you have housing? (Housing is defined as stable  permanent housing and does not include staying ouside in a car, in a tent, in an abandoned building, in an overnight shelter, or couch-surfing.) Yes   Are you worried about losing your housing? No   Lack of transportation? No   Unable to get utilities (heat,electricity)? No         3/25/2025   Dental   Dentist two times every year? Yes           2024   TB Screening   Were you born outside of the [place]? No           Today's PHQ-9 Score:       2024     1:09 PM   PHQ-9 SCORE   PHQ-9 Total Score MyChart 0   PHQ-9 Total Score 0        Patient-reported           3/25/2025   Substance Use   Alcohol more than 3/day or more than 7/wk No   Do you use any other substances recreationally? No     Social History     Tobacco Use    Smoking status: Every Day     Current packs/day: 0.00     Average packs/day: 0.5 packs/day for 20.0 years (10.0 ttl pk-yrs)     Types: Cigarettes     Start date: 1999     Last attempt to quit: 3/2/2025     Years since quittin.0     Passive exposure: Yes    Smokeless tobacco: Never    Tobacco comments:     Would like to try using Chantix for cessation   Vaping Use    Vaping status: Never Used   Substance Use Topics    Alcohol use: Yes     Comment: casual/occasional    Drug use: Never           2024   LAST FHS-7 RESULTS   1st degree relative breast or ovarian cancer No   Any relative bilateral breast cancer Unknown   Any male have breast cancer No   Any ONE woman have BOTH breast AND ovarian cancer No   Any woman with breast cancer before 50yrs Yes   2 or more relatives with breast AND/OR ovarian cancer No   2 or more relatives with breast AND/OR bowel cancer Yes                3/25/2025   STI Screening   New sexual partner(s) since last STI/HIV test? 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 Too Rose; indication:  diarrhea; TA x5 in cucum and ascending colon size 2-3mm; sessile serrated adenoma x2 in descending abnd sigmoid colon size 4-5mm; TA consistent with adv adenoma in rectum size 16-20mm; repeat in 1 yr    COLONOSCOPY  05/22/2024    [facility]; Dr. Marquis; previous hx adv adenoma; transverse colon: tubular adenoma x3, size 4-7 mm; cecum:  normal mucosa; ascending colon:  tubular adenoma x 1, sessile serrated adenoma x 1, size 4-6 mm; descending colon: tubular adenoma, size 6 mm; sigmoid colon: sessile serrated adenoma and hyperplastic polyp x1 size 4-6 mm; repeat in 3 yrs    ENT SURGERY  1985    EYE SURGERY  2010    Lasix in 1 eye     Lab work is in process  Labs reviewed in EPIC  BP Readings from Last 3 Encounters:   03/26/25 (!) 142/80   06/12/24 138/74   02/01/24 124/80    Wt Readings from Last 3 Encounters:   03/26/25 87.7 kg (193 lb 4.8 oz)   06/12/24 86.1 kg (189 lb 12.8 oz)   02/01/24 85 kg (187 lb 6.4 oz)                      Review of Systems  Constitutional, neuro, ENT, endocrine, pulmonary, cardiac, gastrointestinal, genitourinary, musculoskeletal, integument and psychiatric systems are negative, except as otherwise noted.     Objective    Exam  BP (!) 153/87 (BP Location: Right arm, Patient Position: Sitting, Cuff Size: Adult Regular)   Pulse 89   Temp 96.9  F (36.1  C) (Tympanic)   Resp 16   Ht 1.715 m (5' 7.5\")   Wt 87.7 kg (193 lb 4.8 oz)   LMP 03/24/2025 (Exact Date)   SpO2 100%   BMI 29.83 kg/m   " "  Estimated body mass index is 29.83 kg/m  as calculated from the following:    Height as of this encounter: 1.715 m (5' 7.5\").    Weight as of this encounter: 87.7 kg (193 lb 4.8 oz).    Physical Exam  GENERAL: alert and no distress  EYES: Eyes grossly normal to inspection, PERRL and conjunctivae and sclerae normal  HENT: ear canals and TM's normal, nose and mouth without ulcers or lesions  NECK: no adenopathy, no asymmetry, masses, or scars  RESP: lungs clear to auscultation - no rales, rhonchi or wheezes  CV: regular rate and rhythm, normal S1 S2, no S3 or S4, no murmur, click or rub, no peripheral edema  ABDOMEN: soft, nontender, no hepatosplenomegaly, no masses and bowel sounds normal  MS: no gross musculoskeletal defects noted, no edema  SKIN: no suspicious lesions or rashes  NEURO: Normal strength and tone, mentation intact and speech normal  PSYCH: mentation appears normal, affect normal/bright        Signed Electronically by: Torres Hill MD    "

## 2025-04-14 SDOH — HEALTH STABILITY: PHYSICAL HEALTH: ON AVERAGE, HOW MANY MINUTES DO YOU ENGAGE IN EXERCISE AT THIS LEVEL?: 90 MIN

## 2025-04-14 SDOH — HEALTH STABILITY: PHYSICAL HEALTH: ON AVERAGE, HOW MANY DAYS PER WEEK DO YOU ENGAGE IN MODERATE TO STRENUOUS EXERCISE (LIKE A BRISK WALK)?: 4 DAYS

## 2025-04-14 ASSESSMENT — SOCIAL DETERMINANTS OF HEALTH (SDOH): HOW OFTEN DO YOU GET TOGETHER WITH FRIENDS OR RELATIVES?: ONCE A WEEK

## 2025-04-16 PROBLEM — D12.2 BENIGN NEOPLASM OF ASCENDING COLON: Status: ACTIVE | Noted: 2023-05-05

## 2025-04-16 PROBLEM — K57.30 DIVERTICULAR DISEASE OF LARGE INTESTINE: Status: ACTIVE | Noted: 2023-05-03

## 2025-04-16 PROBLEM — D12.5 BENIGN NEOPLASM OF SIGMOID COLON: Status: ACTIVE | Noted: 2023-05-05

## 2025-04-16 PROBLEM — Z86.0100 HISTORY OF COLONIC POLYPS: Status: ACTIVE | Noted: 2024-05-24

## 2025-04-16 PROBLEM — D12.4 BENIGN NEOPLASM OF DESCENDING COLON: Status: ACTIVE | Noted: 2023-05-05

## 2025-04-16 PROBLEM — D12.0 BENIGN NEOPLASM OF CECUM: Status: ACTIVE | Noted: 2023-05-05

## 2025-04-17 ENCOUNTER — OFFICE VISIT (OUTPATIENT)
Dept: FAMILY MEDICINE | Facility: CLINIC | Age: 45
End: 2025-04-17
Payer: COMMERCIAL

## 2025-04-17 VITALS
WEIGHT: 192.2 LBS | DIASTOLIC BLOOD PRESSURE: 84 MMHG | RESPIRATION RATE: 16 BRPM | HEART RATE: 111 BPM | TEMPERATURE: 98.3 F | OXYGEN SATURATION: 99 % | SYSTOLIC BLOOD PRESSURE: 142 MMHG | HEIGHT: 68 IN | BODY MASS INDEX: 29.13 KG/M2

## 2025-04-17 DIAGNOSIS — Z12.4 CERVICAL CANCER SCREENING: Primary | ICD-10-CM

## 2025-04-17 PROCEDURE — G0145 SCR C/V CYTO,THINLAYER,RESCR: HCPCS | Performed by: FAMILY MEDICINE

## 2025-04-17 NOTE — PROGRESS NOTES
"  Assessment & Plan     Cervical cancer screening  Thin Prep pap done            BMI  Estimated body mass index is 29.66 kg/m  as calculated from the following:    Height as of this encounter: 1.715 m (5' 7.5\").    Weight as of this encounter: 87.2 kg (192 lb 3.2 oz).       Counseling  Appropriate preventive services were addressed with this patient via screening, questionnaire, or discussion as appropriate for fall prevention, nutrition, physical activity, Tobacco-use cessation, social engagement, weight loss and cognition.  Checklist reviewing preventive services available has been given to the patient.  Reviewed patient's diet, addressing concerns and/or questions.           Kevin Peñaloza is a 44 year old, presenting for the following health issues:  Follow Up (Pap smear only--had px 03/24/2025)      4/17/2025     3:52 PM   Additional Questions   Roomed by Cecile BROWN CMA   Accompanied by Self     HPI        Patient is here for her pap smear.    She wasn't able to have the pap smear done at her annual physical on 03/24/2025 due to her menstrual cycle.   She has no other concerns today.               Objective    BP (!) 142/84 (BP Location: Right arm, Patient Position: Sitting, Cuff Size: Adult Regular)   Pulse 111   Temp 98.3  F (36.8  C) (Tympanic)   Resp 16   Ht 1.715 m (5' 7.5\")   Wt 87.2 kg (192 lb 3.2 oz)   LMP 03/24/2025 (Exact Date)   SpO2 99%   BMI 29.66 kg/m    Body mass index is 29.66 kg/m .  Physical Exam   GENERAL: alert and no distress   (female): normal female external genitalia, normal urethral meatus, normal vaginal mucosa, cervix without lesion, IUD strings present  MS: no gross musculoskeletal defects noted, no edema            Signed Electronically by: Torres Hill MD    "

## 2025-04-21 LAB
HPV HR 12 DNA CVX QL NAA+PROBE: NEGATIVE
HPV16 DNA CVX QL NAA+PROBE: NEGATIVE
HPV18 DNA CVX QL NAA+PROBE: NEGATIVE
HUMAN PAPILLOMA VIRUS FINAL DIAGNOSIS: NORMAL

## 2025-04-24 LAB
BKR AP ASSOCIATED HPV REPORT: NORMAL
BKR LAB AP GYN ADEQUACY: NORMAL
BKR LAB AP GYN INTERPRETATION: NORMAL
BKR LAB AP LMP: NORMAL
BKR LAB AP PREVIOUS ABNORMAL: NORMAL
PATH REPORT.COMMENTS IMP SPEC: NORMAL
PATH REPORT.COMMENTS IMP SPEC: NORMAL
PATH REPORT.RELEVANT HX SPEC: NORMAL

## 2025-05-15 ENCOUNTER — MYC REFILL (OUTPATIENT)
Dept: FAMILY MEDICINE | Facility: CLINIC | Age: 45
End: 2025-05-15
Payer: COMMERCIAL

## 2025-05-15 DIAGNOSIS — F90.0 ATTENTION DEFICIT HYPERACTIVITY DISORDER (ADHD), PREDOMINANTLY INATTENTIVE TYPE: ICD-10-CM

## 2025-05-15 RX ORDER — DEXTROAMPHETAMINE SACCHARATE, AMPHETAMINE ASPARTATE, DEXTROAMPHETAMINE SULFATE AND AMPHETAMINE SULFATE 5; 5; 5; 5 MG/1; MG/1; MG/1; MG/1
20 TABLET ORAL 2 TIMES DAILY
Qty: 60 TABLET | Refills: 0 | Status: SHIPPED | OUTPATIENT
Start: 2025-05-15

## 2025-06-15 ENCOUNTER — MYC REFILL (OUTPATIENT)
Dept: FAMILY MEDICINE | Facility: CLINIC | Age: 45
End: 2025-06-15
Payer: COMMERCIAL

## 2025-06-15 DIAGNOSIS — F90.0 ATTENTION DEFICIT HYPERACTIVITY DISORDER (ADHD), PREDOMINANTLY INATTENTIVE TYPE: ICD-10-CM

## 2025-06-16 RX ORDER — DEXTROAMPHETAMINE SACCHARATE, AMPHETAMINE ASPARTATE, DEXTROAMPHETAMINE SULFATE AND AMPHETAMINE SULFATE 5; 5; 5; 5 MG/1; MG/1; MG/1; MG/1
20 TABLET ORAL 2 TIMES DAILY
Qty: 60 TABLET | Refills: 0 | Status: SHIPPED | OUTPATIENT
Start: 2025-06-16

## 2025-06-22 ENCOUNTER — TELEPHONE (OUTPATIENT)
Dept: FAMILY MEDICINE | Facility: CLINIC | Age: 45
End: 2025-06-22
Payer: COMMERCIAL

## 2025-06-22 DIAGNOSIS — F90.0 ATTENTION DEFICIT HYPERACTIVITY DISORDER (ADHD), PREDOMINANTLY INATTENTIVE TYPE: ICD-10-CM

## 2025-06-22 NOTE — TELEPHONE ENCOUNTER
Medication Question or Refill    Contacts       Contact Date/Time Type Contact Phone/Fax    06/22/2025 03:23 PM CDT Phone (Incoming) Anabela Haskins (Self) [phone] (M)            What medication are you calling about (include dose and sig)?: amphetamine dextromadtine 20mg    Preferred Pharmacy:  While on vacation only   Pt states [facility] will not transfer but will place on hold while pt is on vacation    [facility]  [address] [place]  [place]  [address]  Ph: [phone]    Controlled Substance Agreement on file:   CSA -- Patient Level:    CSA: None found at the patient level.       Who prescribed the medication?: PCP    Do you need a refill? Yes    When did you use the medication last? 06/22/25  Pt is all out.    Patient offered an appointment? No    Do you have any questions or concerns?  No      Could we send this information to you in [facility] or would you prefer to receive a phone call?:   Patient would prefer a phone call   Okay to leave a detailed message?: Yes at Cell number on file:    Telephone Information:   Mobile [phone]

## 2025-06-23 RX ORDER — DEXTROAMPHETAMINE SACCHARATE, AMPHETAMINE ASPARTATE, DEXTROAMPHETAMINE SULFATE AND AMPHETAMINE SULFATE 5; 5; 5; 5 MG/1; MG/1; MG/1; MG/1
20 TABLET ORAL 2 TIMES DAILY
Qty: 60 TABLET | Refills: 0 | Status: SHIPPED | OUTPATIENT
Start: 2025-06-23

## 2025-07-23 ENCOUNTER — MYC REFILL (OUTPATIENT)
Dept: FAMILY MEDICINE | Facility: CLINIC | Age: 45
End: 2025-07-23
Payer: COMMERCIAL

## 2025-07-23 DIAGNOSIS — F90.0 ATTENTION DEFICIT HYPERACTIVITY DISORDER (ADHD), PREDOMINANTLY INATTENTIVE TYPE: ICD-10-CM

## 2025-07-24 RX ORDER — DEXTROAMPHETAMINE SACCHARATE, AMPHETAMINE ASPARTATE, DEXTROAMPHETAMINE SULFATE AND AMPHETAMINE SULFATE 5; 5; 5; 5 MG/1; MG/1; MG/1; MG/1
20 TABLET ORAL 2 TIMES DAILY
Qty: 60 TABLET | Refills: 0 | Status: SHIPPED | OUTPATIENT
Start: 2025-07-24